# Patient Record
Sex: FEMALE | Race: WHITE | NOT HISPANIC OR LATINO | Employment: OTHER | URBAN - METROPOLITAN AREA
[De-identification: names, ages, dates, MRNs, and addresses within clinical notes are randomized per-mention and may not be internally consistent; named-entity substitution may affect disease eponyms.]

---

## 2017-01-04 ENCOUNTER — OFFICE VISIT (OUTPATIENT)
Dept: INTERNAL MEDICINE | Facility: CLINIC | Age: 72
End: 2017-01-04

## 2017-01-04 VITALS
WEIGHT: 176 LBS | HEART RATE: 91 BPM | DIASTOLIC BLOOD PRESSURE: 82 MMHG | OXYGEN SATURATION: 97 % | HEIGHT: 65 IN | SYSTOLIC BLOOD PRESSURE: 118 MMHG | TEMPERATURE: 98.2 F | BODY MASS INDEX: 29.32 KG/M2

## 2017-01-04 DIAGNOSIS — J20.9 ACUTE BRONCHITIS, UNSPECIFIED ORGANISM: Primary | ICD-10-CM

## 2017-01-04 PROCEDURE — 99214 OFFICE O/P EST MOD 30 MIN: CPT | Performed by: INTERNAL MEDICINE

## 2017-01-04 RX ORDER — AZITHROMYCIN 250 MG/1
TABLET, FILM COATED ORAL
Qty: 6 TABLET | Refills: 0 | Status: SHIPPED | OUTPATIENT
Start: 2017-01-04 | End: 2017-01-25

## 2017-01-04 RX ORDER — BENZONATATE 200 MG/1
200 CAPSULE ORAL 3 TIMES DAILY PRN
Qty: 30 CAPSULE | Refills: 0 | Status: SHIPPED | OUTPATIENT
Start: 2017-01-04 | End: 2017-01-25

## 2017-01-04 NOTE — MR AVS SNAPSHOT
Deja Juárez   1/4/2017 9:00 AM   Office Visit    Dept Phone:  [phone]   Encounter #:  [id number]    Provider:  Arturo Abdalla MD   Department:  [facility] INTERNAL MEDICINE                Your Full Care Plan              Today's Medication Changes          These changes are accurate as of: 1/4/17  9:37 AM.  If you have any questions, ask your nurse or doctor.               New Medication(s)Ordered:     azithromycin 250 MG tablet   Commonly known as:  ZITHROMAX   Take 2 tablets the first day, then 1 tablet daily for 4 days.   Started by:  Arturo Abdalla MD       benzonatate 200 MG capsule   Commonly known as:  TESSALON   Take 1 capsule by mouth 3 (Three) Times a Day As Needed for cough.   Started by:  Arturo Abdalla MD         Stop taking medication(s)listed here:     PARoxetine 10 MG tablet   Commonly known as:  PAXIL   Stopped by:  Arturo Abdalla MD                Where to Get Your Medications      These medications were sent to [address] [facility] AT [facility] [phone] [facility] [phone] [place] [address]     Phone:  [phone]     azithromycin 250 MG tablet    benzonatate 200 MG capsule                  Your Updated Medication List          This list is accurate as of: 1/4/17  9:37 AM.  Always use your most recent med list.                azithromycin 250 MG tablet   Commonly known as:  ZITHROMAX   Take 2 tablets the first day, then 1 tablet daily for 4 days.       benzonatate 200 MG capsule   Commonly known as:  TESSALON   Take 1 capsule by mouth 3 (Three) Times a Day As Needed for cough.       CALCIUM 500 + D PO       DAYQUIL PO       esomeprazole 20 MG capsule   Commonly known as:  nexIUM       naproxen sodium 220 MG tablet   Commonly known as:  ALEVE       NYQUIL PO               You Were Diagnosed With        Codes Comments    Acute bronchitis, unspecified organism     -  Primary ICD-10-CM: J20.9  ICD-9-CM: 466.0       Instructions     None    Patient Instructions History      Upcoming Appointments     Visit Type Date Time Department    ACUTE           2017  9:00 AM [facility]    FOLLOW UP 2017  3:00 PM [facility]    OFFICE VISIT 2017  1:30 PM [facility]      [employer] Signup     [facility] "[employer] allows you to send messages to your doctor, view your test results, renew your prescriptions, schedule appointments, and more. To sign up, go to [employer] and click on the Sign Up Now link in the New User? box. Enter your "[employer] Activation Code exactly as it appears below along with the last four digits of your Social Security Number and your Date of Birth () to complete the sign-up process. If you do not sign up before the expiration date, you must request a new code.    "[employer] Activation Code: [email or web address]  Expires: 2017  9:37 AM    If you have questions, you can email [email or web address] or call [phone] to talk to our "[employer] staff. Remember, "[employer] is NOT to be used for urgent needs. For medical emergencies, dial 911.               Other Info from Your Visit           Your Appointments     2017  3:00 PM EST   Follow Up with Vicky Smiley MD   [facility] INTERNAL MEDICINE (--)    [address] [place] [phone]           Arrive 15 minutes prior to appointment.            2017  1:30 PM EDT   Office Visit with Vicky Smiley MD   [facility] INTERNAL MEDICINE (--)    [address] [place] [phone]   [phone]           Arrive 15 minutes prior to appointment.              Allergies     No Known Allergies      Reason for Visit     URI x 1 week      Vital Signs     Blood Pressure Pulse Temperature Height Weight Oxygen Saturation    118/82 (BP Location: Left arm, Patient Position: Sitting, Cuff Size: Adult) 91  "98.2 °F (36.8 °C) (Oral) 65\" (165.1 cm) 176 lb (79.8 kg) 97%    Body Mass Index Smoking Status                29.29 kg/m2 Former Smoker          Problems and Diagnoses Noted     Acute bronchitis    -  Primary        "

## 2017-01-04 NOTE — PROGRESS NOTES
Subjective   Deja Juárez is a 71 y.o. female.     URI    This is a new problem. The current episode started in the past 7 days. Associated symptoms include coughing (Yellow sputum), rhinorrhea, sneezing and a sore throat. Pertinent negatives include no chest pain or wheezing.        The following portions of the patient's history were reviewed and updated as appropriate: allergies, current medications, past family history, past medical history, past social history, past surgical history and problem list.    Review of Systems   Constitutional: Positive for chills. Negative for fever.   HENT: Positive for postnasal drip, rhinorrhea, sinus pressure, sneezing and sore throat.    Respiratory: Positive for cough (Yellow sputum). Negative for wheezing.    Cardiovascular: Negative for chest pain and palpitations.       Objective   Physical Exam   Constitutional: She appears well-developed.   HENT:   Right Ear: External ear normal.   Left Ear: External ear normal.   Mouth/Throat: Oropharynx is clear and moist.   Neck: Neck supple.   Cardiovascular: Normal rate, regular rhythm and normal heart sounds.    Repeat 134/70   Pulmonary/Chest: Effort normal.   Scattered rhonchi     Musculoskeletal: Normal range of motion.   Neurological: She is alert.   Vitals reviewed.      Assessment/Plan   Deja was seen today for uri.    Diagnoses and all orders for this visit:    Acute bronchitis, unspecified organism  -     azithromycin (ZITHROMAX) 250 MG tablet; Take 2 tablets the first day, then 1 tablet daily for 4 days.  -     benzonatate (TESSALON) 200 MG capsule; Take 1 capsule by mouth 3 (Three) Times a Day As Needed for cough.    Advised other OTC RXs

## 2017-01-25 ENCOUNTER — OFFICE VISIT (OUTPATIENT)
Dept: INTERNAL MEDICINE | Facility: CLINIC | Age: 72
End: 2017-01-25

## 2017-01-25 VITALS
WEIGHT: 168 LBS | HEIGHT: 65 IN | OXYGEN SATURATION: 97 % | SYSTOLIC BLOOD PRESSURE: 120 MMHG | HEART RATE: 78 BPM | BODY MASS INDEX: 27.99 KG/M2 | DIASTOLIC BLOOD PRESSURE: 80 MMHG

## 2017-01-25 DIAGNOSIS — Z01.818 PRE-OP EXAM: Primary | ICD-10-CM

## 2017-01-25 PROCEDURE — 99213 OFFICE O/P EST LOW 20 MIN: CPT | Performed by: INTERNAL MEDICINE

## 2017-01-25 RX ORDER — ESOMEPRAZOLE MAGNESIUM 10 MG/1
10 GRANULE, FOR SUSPENSION, EXTENDED RELEASE ORAL EVERY 24 HOURS
COMMUNITY
End: 2017-01-25

## 2017-01-25 RX ORDER — CITALOPRAM 20 MG/1
20 TABLET ORAL
COMMUNITY
End: 2017-01-25

## 2017-01-25 RX ORDER — MULTIVITAMIN
1 TABLET ORAL DAILY
COMMUNITY

## 2017-01-25 NOTE — PROGRESS NOTES
"Chief Complaint   Patient presents with   • surgical clearance     knee replacement        Subjective   Deja Juárez is a 72 y.o. female     HPI:  She is going to have left total knee replacement. 2/13/2017.     The following portions of the patient's history were reviewed and updated as appropriate: allergies, current medications, past social history, problem list, past surgical history    Review of Systems   Constitutional: Negative for appetite change, fatigue and fever.   HENT: Negative for sinus pressure and sore throat.    Eyes: Negative for visual disturbance.   Respiratory: Negative for cough, shortness of breath and wheezing.    Cardiovascular: Negative for chest pain, palpitations and leg swelling.   Gastrointestinal: Negative for abdominal pain, constipation, diarrhea, nausea and vomiting.   Endocrine: Negative for cold intolerance and heat intolerance.   Genitourinary: Negative for dysuria and frequency.   Neurological: Negative for dizziness, syncope and headaches.   Hematological: Does not bruise/bleed easily.       Visit Vitals   • /80 (BP Location: Left arm, Patient Position: Sitting, Cuff Size: Adult)   • Pulse 78   • Ht 65\" (165.1 cm)   • Wt 168 lb (76.2 kg)   • SpO2 97%   • BMI 27.96 kg/m2        Objective   Physical Exam   Constitutional: She is oriented to person, place, and time. She appears well-developed and well-nourished. No distress.   Neck: Normal carotid pulses present. Carotid bruit is not present.   Cardiovascular: Normal rate, regular rhythm, S1 normal, S2 normal and intact distal pulses.  Exam reveals no gallop and no friction rub.    No murmur heard.  Pulses:       Carotid pulses are 2+ on the right side, and 2+ on the left side.  Pulmonary/Chest: Effort normal and breath sounds normal. No respiratory distress. She has no wheezes. She has no rales. Chest wall is not dull to percussion.   Musculoskeletal: She exhibits no edema.   Neurological: She is alert and oriented to " person, place, and time. No cranial nerve deficit.   Skin: Skin is warm and dry.   Nursing note and vitals reviewed.      Assessment/Plan   Problem List Items Addressed This Visit     None      Visit Diagnoses     Pre-op exam    -  Primary    She is medically clear for surgery

## 2017-01-25 NOTE — MR AVS SNAPSHOT
Deja Juárez   1/25/2017 3:00 PM   Office Visit    Dept Phone:  [phone]   Encounter #:  [id number]    Provider:  Vicky Smiley MD   Department:  [facility] INTERNAL MEDICINE                Your Full Care Plan              Today's Medication Changes          These changes are accurate as of: 1/25/17  3:32 PM.  If you have any questions, ask your nurse or doctor.               Medication(s)that have changed:     esomeprazole 20 MG capsule   Commonly known as:  nexIUM   Take 20 mg by mouth every morning before breakfast.   What changed:  Another medication with the same name was removed. Continue taking this medication, and follow the directions you see here.         Stop taking medication(s)listed here:     azithromycin 250 MG tablet   Commonly known as:  ZITHROMAX           benzonatate 200 MG capsule   Commonly known as:  TESSALON           citalopram 20 MG tablet   Commonly known as:  CeleXA           DAYQUIL PO           NYQUIL PO                      Your Updated Medication List          This list is accurate as of: 1/25/17  3:32 PM.  Always use your most recent med list.                * CALCIUM 500 + D PO       * CALTRATE 600+D 600-400 MG-UNIT per tablet   Generic drug:  calcium carbonate-vitamin d       esomeprazole 20 MG capsule   Commonly known as:  nexIUM       multivitamin tablet       * mupirocin 2 % ointment   Commonly known as:  BACTROBAN       * mupirocin 2 % ointment   Commonly known as:  BACTROBAN   Start taking on:  2/8/2017       naproxen sodium 220 MG tablet   Commonly known as:  ALEVE       Vitamin D 1000 UNITS tablet       * Notice:  This list has 4 medication(s) that are the same as other medications prescribed for you. Read the directions carefully, and ask your doctor or other care provider to review them with you.            You Were Diagnosed With        Codes Comments    Pre-op exam    -  Primary ICD-10-CM: Z01.818  ICD-9-CM: V72.84        "  Instructions    Use skin moisturizer daily - Cetaphil Cream, Eucerin Cream are good ones     Patient Instructions History      Upcoming Appointments     Visit Type Date Time Department    FOLLOW UP 2017  3:00 PM [facility]    OFFICE VISIT 2017  1:30 PM [employer] Signup     [facility] [employer] allows you to send messages to your doctor, view your test results, renew your prescriptions, schedule appointments, and more. To sign up, go to [employer] and click on the Sign Up Now link in the New User? box. Enter your [employer] Activation Code exactly as it appears below along with the last four digits of your Social Security Number and your Date of Birth () to complete the sign-up process. If you do not sign up before the expiration date, you must request a new code.    [employer] Activation Code: [email or web address]  Expires: 2017  3:32 PM    If you have questions, you can email [email or web address] or call [phone] to talk to our [employer] staff. Remember, [employer] is NOT to be used for urgent needs. For medical emergencies, dial 911.               Other Info from Your Visit           Your Appointments     2017  1:30 PM EDT   Office Visit with Vicky Smiley MD   [facility] MEDICAL [facility] INTERNAL MEDICINE (--)    [address] [address] [phone]           Arrive 15 minutes prior to appointment.              Allergies     No Known Allergies      Reason for Visit     surgical clearance knee replacement      Vital Signs     Blood Pressure Pulse Height Weight Oxygen Saturation Body Mass Index    120/80 (BP Location: Left arm, Patient Position: Sitting, Cuff Size: Adult) 78 65\" (165.1 cm) 168 lb (76.2 kg) 97% 27.96 kg/m2    Smoking Status                   Former Smoker           Problems and Diagnoses Noted     Encounter for pre-operative examination    -  Primary      No Longer an Issue     Elevated blood pressure    "

## 2017-01-27 ENCOUNTER — TELEPHONE (OUTPATIENT)
Dept: INTERNAL MEDICINE | Facility: CLINIC | Age: 72
End: 2017-01-27

## 2017-01-27 DIAGNOSIS — Z01.810 PRE-OPERATIVE CARDIOVASCULAR EXAMINATION: Primary | ICD-10-CM

## 2017-01-27 NOTE — TELEPHONE ENCOUNTER
----- Message from Stephanie Gross sent at 1/27/2017  2:29 PM EST -----  Contact: Patient  Dr. Smiley patient     Patient called.  She has surgery scheduled 02/13/2017 for left total knee replacement.  The anesthesiologist will not administer anesthesia until patient is cleared by a cardiologist.  Patient wants referral from Dr. Smiley for cardiologist.  Please advise.      Patient:  [phone] (patient will not be home Monday)

## 2017-01-31 ENCOUNTER — OFFICE VISIT (OUTPATIENT)
Dept: INTERNAL MEDICINE | Facility: CLINIC | Age: 72
End: 2017-01-31

## 2017-01-31 VITALS
SYSTOLIC BLOOD PRESSURE: 124 MMHG | WEIGHT: 169 LBS | TEMPERATURE: 98.4 F | BODY MASS INDEX: 28.16 KG/M2 | HEART RATE: 95 BPM | DIASTOLIC BLOOD PRESSURE: 78 MMHG | HEIGHT: 65 IN | OXYGEN SATURATION: 96 %

## 2017-01-31 DIAGNOSIS — J20.9 ACUTE BRONCHITIS, UNSPECIFIED ORGANISM: Primary | ICD-10-CM

## 2017-01-31 PROCEDURE — 99213 OFFICE O/P EST LOW 20 MIN: CPT | Performed by: NURSE PRACTITIONER

## 2017-01-31 RX ORDER — PROMETHAZINE HYDROCHLORIDE AND CODEINE PHOSPHATE 6.25; 1 MG/5ML; MG/5ML
5 SYRUP ORAL EVERY 4 HOURS PRN
Qty: 118 ML | Refills: 0 | Status: SHIPPED | OUTPATIENT
Start: 2017-01-31 | End: 2017-02-08 | Stop reason: SDUPTHER

## 2017-01-31 RX ORDER — SULFAMETHOXAZOLE AND TRIMETHOPRIM 800; 160 MG/1; MG/1
1 TABLET ORAL 2 TIMES DAILY
Qty: 14 TABLET | Refills: 0 | Status: SHIPPED | OUTPATIENT
Start: 2017-01-31 | End: 2017-02-07

## 2017-01-31 RX ORDER — GUAIFENESIN 600 MG/1
1200 TABLET, EXTENDED RELEASE ORAL 2 TIMES DAILY
Qty: 14 TABLET
Start: 2017-01-31 | End: 2017-02-07

## 2017-02-08 ENCOUNTER — APPOINTMENT (OUTPATIENT)
Dept: WOMENS IMAGING | Facility: HOSPITAL | Age: 72
End: 2017-02-08

## 2017-02-08 ENCOUNTER — OFFICE VISIT (OUTPATIENT)
Dept: CARDIOLOGY | Facility: CLINIC | Age: 72
End: 2017-02-08

## 2017-02-08 ENCOUNTER — OFFICE VISIT (OUTPATIENT)
Dept: INTERNAL MEDICINE | Facility: CLINIC | Age: 72
End: 2017-02-08

## 2017-02-08 VITALS
HEIGHT: 65 IN | DIASTOLIC BLOOD PRESSURE: 66 MMHG | BODY MASS INDEX: 27.49 KG/M2 | WEIGHT: 165 LBS | SYSTOLIC BLOOD PRESSURE: 124 MMHG | HEART RATE: 76 BPM

## 2017-02-08 VITALS
HEART RATE: 86 BPM | SYSTOLIC BLOOD PRESSURE: 150 MMHG | TEMPERATURE: 97.5 F | WEIGHT: 167 LBS | BODY MASS INDEX: 27.82 KG/M2 | OXYGEN SATURATION: 98 % | HEIGHT: 65 IN | DIASTOLIC BLOOD PRESSURE: 82 MMHG

## 2017-02-08 DIAGNOSIS — Z01.810 PREOP CARDIOVASCULAR EXAM: Primary | ICD-10-CM

## 2017-02-08 DIAGNOSIS — E78.5 HYPERLIPIDEMIA, UNSPECIFIED HYPERLIPIDEMIA TYPE: ICD-10-CM

## 2017-02-08 DIAGNOSIS — R94.31 ABNORMAL ECG: ICD-10-CM

## 2017-02-08 DIAGNOSIS — R07.89 OTHER CHEST PAIN: ICD-10-CM

## 2017-02-08 DIAGNOSIS — R05.9 COUGH: ICD-10-CM

## 2017-02-08 DIAGNOSIS — R06.02 SHORTNESS OF BREATH: ICD-10-CM

## 2017-02-08 DIAGNOSIS — J20.9 ACUTE BRONCHITIS, UNSPECIFIED ORGANISM: Primary | ICD-10-CM

## 2017-02-08 PROCEDURE — 99213 OFFICE O/P EST LOW 20 MIN: CPT | Performed by: NURSE PRACTITIONER

## 2017-02-08 PROCEDURE — 93000 ELECTROCARDIOGRAM COMPLETE: CPT | Performed by: INTERNAL MEDICINE

## 2017-02-08 PROCEDURE — 71020 XR CHEST 2 VW: CPT | Performed by: NURSE PRACTITIONER

## 2017-02-08 PROCEDURE — 71020 CHG CHEST X-RAY 2 VW: CPT | Performed by: RADIOLOGY

## 2017-02-08 PROCEDURE — 99204 OFFICE O/P NEW MOD 45 MIN: CPT | Performed by: INTERNAL MEDICINE

## 2017-02-08 RX ORDER — PROMETHAZINE HYDROCHLORIDE AND CODEINE PHOSPHATE 6.25; 1 MG/5ML; MG/5ML
5 SYRUP ORAL EVERY 4 HOURS PRN
Qty: 118 ML | Refills: 0 | Status: SHIPPED | OUTPATIENT
Start: 2017-02-08 | End: 2017-02-13

## 2017-02-08 RX ORDER — AZITHROMYCIN 250 MG/1
TABLET, FILM COATED ORAL
Qty: 6 TABLET | Refills: 0 | Status: SHIPPED | OUTPATIENT
Start: 2017-02-08 | End: 2017-04-14

## 2017-02-08 RX ORDER — METHYLPREDNISOLONE 4 MG/1
TABLET ORAL
Qty: 21 TABLET | Refills: 0 | Status: SHIPPED | OUTPATIENT
Start: 2017-02-08 | End: 2017-04-14

## 2017-02-08 NOTE — PROGRESS NOTES
Date of Office Visit: 2017  Encounter Provider: Lucy Garcia MD  Place of Service: [facility]  Patient Name: Deja Juárez  :1945      Patient ID:  Deja Juárez is a 72 y.o. female is here for a preoperative cardiac exam.           History of Present Illness     Deja Juárez is here today as a preoperative evaluation.  She is schedule to have a left knee replacement performed by Dr. Vo.  She has been short-winded with a cough for about six weeks, which she thinks is infectious in nature due to the flu and an upper respiratory tract infection.  She has noticed active wheezing.  She has no fever or chills.  She is not bringing anything up with her cough.  With this, she has also had chest pain with activity, but mostly with her cough and with deep breathing.      She does not feel her heart racing or skipping.  She has no dizziness or syncope.  Prior to all this she was feeling fine.  She did have a preoperative ECG performed which was abnormal and revealed a T-wave inversion across the precordial leads.      She has never had diabetes mellitus, heart failure, myocardial infarction, and has no history of hypertension.   She does not exercise regularly but she does golf.  She has a history of gastroesophageal reflux disease.  She has no history of sleep apnea, blood clots, kidney problems, asthma, emphysema, seizure, stroke or aneurysm.  She has anxiety and depression, and a history of scoliosis.     She quit smoking 45 years ago.  She is  and lives with her .  She is retired.  She has two grandchildren.  She drinks one cup of coffee per day.  No alcohol or drugs.      Diabetes is in her family in her father as well as hypertension being present in her mother and father.            Past Medical History   Diagnosis Date   • Anxiety    • Depression    • GERD (gastroesophageal reflux disease)    • Hyperlipidemia    • Scoliosis           Past Surgical History   Procedure Laterality Date   • Lipoma excision       from shoulder       Current Outpatient Prescriptions on File Prior to Visit   Medication Sig Dispense Refill   • Calcium Carbonate-Vitamin D (CALCIUM 500 + D PO) Take  by mouth. Vitamin DX 1000 IU     • calcium carbonate-vitamin d (CALTRATE 600+D) 600-400 MG-UNIT per tablet Take 1 tablet by mouth.     • Cholecalciferol (VITAMIN D) 1000 UNITS tablet Take 1,000 Units by mouth.     • esomeprazole (NexIUM) 20 MG capsule Take 20 mg by mouth As Needed.     • Multiple Vitamin (MULTIVITAMIN) tablet Take 1 tablet by mouth.     • naproxen sodium (ALEVE) 220 MG tablet Take 220 mg by mouth 2 (two) times a day as needed for mild pain (1-3).     • [] guaiFENesin (MUCINEX) 600 MG 12 hr tablet Take 2 tablets by mouth 2 (Two) Times a Day for 7 days. (Patient taking differently: Take 1,200 mg by mouth As Needed.) 14 tablet    • [] sulfamethoxazole-trimethoprim (BACTRIM DS,SEPTRA DS) 800-160 MG per tablet Take 1 tablet by mouth 2 (Two) Times a Day for 7 days. 14 tablet 0   • [DISCONTINUED] mupirocin (BACTROBAN) 2 % ointment        No current facility-administered medications on file prior to visit.        Social History     Social History   • Marital status:      Spouse name: N/A   • Number of children: N/A   • Years of education: N/A     Occupational History   • Not on file.     Social History Main Topics   • Smoking status: Former Smoker   • Smokeless tobacco: Not on file      Comment: CAFFEINE USE   • Alcohol use No   • Drug use: No   • Sexual activity: Not on file     Other Topics Concern   • Not on file     Social History Narrative           Review of Systems   Constitution: Negative.   HENT: Negative for congestion and headaches.    Eyes: Negative for vision loss in left eye and vision loss in right eye.   Respiratory: Negative.  Negative for cough, hemoptysis, shortness of breath, sleep disturbances due to breathing,  "snoring, sputum production and wheezing.    Endocrine: Negative.    Hematologic/Lymphatic: Negative.    Skin: Negative for poor wound healing and rash.   Musculoskeletal: Negative for falls, gout, muscle cramps and myalgias.   Gastrointestinal: Negative for abdominal pain, diarrhea, dysphagia, hematemesis, melena, nausea and vomiting.   Neurological: Negative for excessive daytime sleepiness, dizziness, light-headedness, loss of balance, seizures and vertigo.   Psychiatric/Behavioral: Negative for depression and substance abuse. The patient is not nervous/anxious.        Procedures    ECG 12 Lead  Date/Time: 2/8/2017 12:20 PM  Performed by: MORE PEACOCK  Authorized by: MORE PEACOCK   Comparison: not compared with previous ECG   Rhythm: sinus rhythm  T depression: V2, V3, V4, V5 and V6  Clinical impression: abnormal ECG               Objective:      Vitals:    02/08/17 1200 02/08/17 1207   BP: 138/60 124/66   BP Location: Right arm Left arm   Patient Position: Sitting Sitting   Pulse: 76    Weight: 165 lb (74.8 kg)    Height: 65\" (165.1 cm)      Body mass index is 27.46 kg/(m^2).    Physical Exam   Constitutional: She is oriented to person, place, and time. She appears well-developed and well-nourished. No distress.   HENT:   Head: Normocephalic and atraumatic.   Eyes: Conjunctivae are normal. No scleral icterus.   Neck: Neck supple. No JVD present. Carotid bruit is not present. No thyromegaly present.   Cardiovascular: Normal rate, regular rhythm, S1 normal, S2 normal, normal heart sounds and intact distal pulses.   No extrasystoles are present. PMI is not displaced.  Exam reveals no gallop.    No murmur heard.  Pulses:       Carotid pulses are 2+ on the right side, and 2+ on the left side.       Radial pulses are 2+ on the right side, and 2+ on the left side.        Dorsalis pedis pulses are 2+ on the right side, and 2+ on the left side.        Posterior tibial pulses are 2+ on the right side, and " 2+ on the left side.   Pulmonary/Chest: Effort normal. No respiratory distress. She has wheezes. She has no rhonchi. She has no rales. She exhibits no tenderness.   Abdominal: Soft. Bowel sounds are normal. She exhibits no distension, no abdominal bruit and no mass. There is no tenderness.   Musculoskeletal: She exhibits no edema or deformity.   Lymphadenopathy:     She has no cervical adenopathy.   Neurological: She is alert and oriented to person, place, and time. No cranial nerve deficit.   Skin: Skin is warm and dry. No rash noted. She is not diaphoretic. No cyanosis. No pallor. Nails show no clubbing.   Psychiatric: She has a normal mood and affect. Judgment normal.   Vitals reviewed.      Lab Review:       Assessment:      Diagnosis Plan   1. Preop cardiovascular exam     2. Hyperlipidemia, unspecified hyperlipidemia type     3. Abnormal ECG        1. Preoperative evaluation prior to left knee surgery.    2. Abnormal EKG with anterior T-wave inversions.    3. Dyspnea on exertion with mild exertional chest tightness and chest tightness noticed with coughing.  I suspect this is mostly due to pulmonary issues at this point; however, given the way her EKG looks I think a stress study and echocardiogram is also warranted.  She has other risks for coronary disease including her age and hyperlipidemia.    4. Hyperlipidemia.  She is on no medication for this.           Plan:       Set up testing and if normal, no f/u is needed. No medication changes.

## 2017-02-09 NOTE — PROGRESS NOTES
Subjective   Deja Juárez is a 72 y.o. female who presents due to respiratory symptoms.    HPI Comments: She continues to c/o a persistent cough which has mainly been nonproductive, denies shortness of breath but does c/o chest tightness. She has taken Mucinex daily but cough has remained nonproductive.  She is scheduled for left knee replacement 2/13/17. She is scheduled for an echo and a stress test on Fri due to abnormal ECG.    URI    This is a new problem. The current episode started 1 to 4 weeks ago. The problem has been gradually worsening. There has been no fever. Associated symptoms include congestion, coughing, rhinorrhea, sinus pain, sneezing and a sore throat. Pertinent negatives include no abdominal pain, chest pain, diarrhea, dysuria, ear pain, nausea, vomiting or wheezing. She has tried acetaminophen for the symptoms. The treatment provided mild relief.   Cough   This is a new problem. The current episode started 1 to 4 weeks ago. The problem has been unchanged. Associated symptoms include postnasal drip, rhinorrhea and a sore throat. Pertinent negatives include no chest pain, chills, ear pain, fever, shortness of breath or wheezing.        The following portions of the patient's history were reviewed and updated as appropriate: allergies, current medications, past social history and problem list.    Past Medical History   Diagnosis Date   • Anxiety    • Depression    • GERD (gastroesophageal reflux disease)    • Hyperlipidemia    • Scoliosis          Current Outpatient Prescriptions:   •  Calcium Carbonate-Vitamin D (CALCIUM 500 + D PO), Take  by mouth. Vitamin DX 1000 IU, Disp: , Rfl:   •  calcium carbonate-vitamin d (CALTRATE 600+D) 600-400 MG-UNIT per tablet, Take 1 tablet by mouth., Disp: , Rfl:   •  Cholecalciferol (VITAMIN D) 1000 UNITS tablet, Take 1,000 Units by mouth., Disp: , Rfl:   •  esomeprazole (NexIUM) 20 MG capsule, Take 20 mg by mouth As Needed., Disp: , Rfl:   •  Multiple Vitamin  "(MULTIVITAMIN) tablet, Take 1 tablet by mouth., Disp: , Rfl:   •  naproxen sodium (ALEVE) 220 MG tablet, Take 220 mg by mouth 2 (two) times a day as needed for mild pain (1-3)., Disp: , Rfl:   •  azithromycin (ZITHROMAX) 250 MG tablet, Take 2 tablets the first day, then 1 tablet daily for 4 days., Disp: 6 tablet, Rfl: 0  •  MethylPREDNISolone (MEDROL) 4 MG tablet, follow package directions, Disp: 21 tablet, Rfl: 0  •  promethazine-codeine (PHENERGAN with CODEINE) 6.25-10 MG/5ML syrup, Take 5 mL by mouth Every 4 (Four) Hours As Needed for cough for up to 5 days., Disp: 118 mL, Rfl: 0    No Known Allergies    Review of Systems   Constitutional: Positive for fatigue. Negative for activity change, appetite change, chills, fever and unexpected weight change.   HENT: Positive for congestion, postnasal drip, rhinorrhea, sinus pressure, sneezing and sore throat. Negative for drooling, ear pain, facial swelling, hearing loss, mouth sores, nosebleeds, trouble swallowing and voice change.    Eyes: Negative for pain, discharge, itching and visual disturbance.   Respiratory: Positive for cough and chest tightness. Negative for choking, shortness of breath and wheezing.    Cardiovascular: Negative for chest pain and palpitations.   Gastrointestinal: Negative for abdominal pain, constipation, diarrhea, nausea and vomiting.   Endocrine: Negative for polyuria.   Genitourinary: Negative for dysuria and frequency.   Musculoskeletal: Negative for back pain and joint swelling.       Objective   Vitals:    02/08/17 1357   BP: 150/82   BP Location: Left arm   Patient Position: Sitting   Cuff Size: Adult   Pulse: 86   Temp: 97.5 °F (36.4 °C)   TempSrc: Oral   SpO2: 98%   Weight: 167 lb (75.8 kg)   Height: 65\" (165.1 cm)     Physical Exam   Constitutional: She appears well-developed and well-nourished. She is cooperative. She does not have a sickly appearance. She does not appear ill.   HENT:   Head: Normocephalic.   Right Ear: Hearing and " external ear normal. No drainage, swelling or tenderness. Tympanic membrane is bulging. Tympanic membrane is not erythematous. No middle ear effusion. No decreased hearing is noted.   Left Ear: Hearing and external ear normal. No drainage, swelling or tenderness. Tympanic membrane is bulging. Tympanic membrane is not erythematous.  No middle ear effusion. No decreased hearing is noted.   Nose: Nose normal. No mucosal edema, rhinorrhea, sinus tenderness or nasal deformity. Right sinus exhibits no maxillary sinus tenderness and no frontal sinus tenderness. Left sinus exhibits no maxillary sinus tenderness and no frontal sinus tenderness.   Mouth/Throat: Mucous membranes are normal. Posterior oropharyngeal erythema present.   Eyes: Conjunctivae and lids are normal.   Neck: Trachea normal.   Cardiovascular: Regular rhythm, normal heart sounds and normal pulses.    No murmur heard.  Pulmonary/Chest: No respiratory distress. She has no decreased breath sounds. She has wheezes. She has no rhonchi. She has no rales.   Lymphadenopathy:     She has no cervical adenopathy.   Neurological: She is alert.   Skin: Skin is warm, dry and intact.   Nursing note and vitals reviewed.      Assessment/Plan   Deja was seen today for uri and cough.    Diagnoses and all orders for this visit:    Acute bronchitis, unspecified organism  Comments:  Will treat with Medrol due to persistent cough and chest congestion  Orders:  -     MethylPREDNISolone (MEDROL) 4 MG tablet; follow package directions  -     azithromycin (ZITHROMAX) 250 MG tablet; Take 2 tablets the first day, then 1 tablet daily for 4 days.  -     promethazine-codeine (PHENERGAN with CODEINE) 6.25-10 MG/5ML syrup; Take 5 mL by mouth Every 4 (Four) Hours As Needed for cough for up to 5 days.    Cough  Comments:  no acute abnl CXR-will send for review  Orders:  -     XR Chest 2 View

## 2017-02-10 ENCOUNTER — HOSPITAL ENCOUNTER (OUTPATIENT)
Dept: CARDIOLOGY | Facility: HOSPITAL | Age: 72
Discharge: HOME | End: 2017-02-10
Attending: INTERNAL MEDICINE | Admitting: INTERNAL MEDICINE

## 2017-02-10 ENCOUNTER — HOSPITAL ENCOUNTER (OUTPATIENT)
Dept: CARDIOLOGY | Facility: HOSPITAL | Age: 72
Discharge: HOME | End: 2017-02-10
Attending: INTERNAL MEDICINE

## 2017-02-10 VITALS
HEART RATE: 92 BPM | HEIGHT: 65 IN | DIASTOLIC BLOOD PRESSURE: 70 MMHG | BODY MASS INDEX: 27.49 KG/M2 | SYSTOLIC BLOOD PRESSURE: 120 MMHG | WEIGHT: 165 LBS

## 2017-02-10 DIAGNOSIS — R94.31 ABNORMAL ECG: ICD-10-CM

## 2017-02-10 DIAGNOSIS — E78.5 HYPERLIPIDEMIA, UNSPECIFIED HYPERLIPIDEMIA TYPE: ICD-10-CM

## 2017-02-10 DIAGNOSIS — Z01.810 PREOP CARDIOVASCULAR EXAM: ICD-10-CM

## 2017-02-10 DIAGNOSIS — R06.02 SHORTNESS OF BREATH: ICD-10-CM

## 2017-02-10 DIAGNOSIS — R07.89 OTHER CHEST PAIN: ICD-10-CM

## 2017-02-10 LAB
ASCENDING AORTA: 3.3 CM
BH CV ECHO MEAS - ACS: 1.9 CM
BH CV ECHO MEAS - AO MAX PG (FULL): 1.2 MMHG
BH CV ECHO MEAS - AO MAX PG: 5.3 MMHG
BH CV ECHO MEAS - AO MEAN PG (FULL): 0.61 MMHG
BH CV ECHO MEAS - AO MEAN PG: 3 MMHG
BH CV ECHO MEAS - AO ROOT AREA (BSA CORRECTED): 1.9
BH CV ECHO MEAS - AO ROOT AREA: 9.5 CM^2
BH CV ECHO MEAS - AO ROOT DIAM: 3.5 CM
BH CV ECHO MEAS - AO V2 MAX: 115.4 CM/SEC
BH CV ECHO MEAS - AO V2 MEAN: 82.2 CM/SEC
BH CV ECHO MEAS - AO V2 VTI: 24.3 CM
BH CV ECHO MEAS - AVA(I,A): 2.7 CM^2
BH CV ECHO MEAS - AVA(I,D): 2.7 CM^2
BH CV ECHO MEAS - AVA(V,A): 3 CM^2
BH CV ECHO MEAS - AVA(V,D): 3 CM^2
BH CV ECHO MEAS - BSA(HAYCOCK): 1.9 M^2
BH CV ECHO MEAS - BSA: 1.8 M^2
BH CV ECHO MEAS - BZI_BMI: 27.5 KILOGRAMS/M^2
BH CV ECHO MEAS - BZI_METRIC_HEIGHT: 165.1 CM
BH CV ECHO MEAS - BZI_METRIC_WEIGHT: 74.8 KG
BH CV ECHO MEAS - CONTRAST EF (2CH): 65.3 ML/M^2
BH CV ECHO MEAS - CONTRAST EF 4CH: 69.1 ML/M^2
BH CV ECHO MEAS - EDV(CUBED): 93.1 ML
BH CV ECHO MEAS - EDV(MOD-SP2): 72 ML
BH CV ECHO MEAS - EDV(MOD-SP4): 97 ML
BH CV ECHO MEAS - EDV(TEICH): 94 ML
BH CV ECHO MEAS - EF(CUBED): 73.3 %
BH CV ECHO MEAS - EF(MOD-SP2): 65.3 %
BH CV ECHO MEAS - EF(MOD-SP4): 69.1 %
BH CV ECHO MEAS - EF(TEICH): 65.2 %
BH CV ECHO MEAS - ESV(CUBED): 24.9 ML
BH CV ECHO MEAS - ESV(MOD-SP2): 25 ML
BH CV ECHO MEAS - ESV(MOD-SP4): 30 ML
BH CV ECHO MEAS - ESV(TEICH): 32.7 ML
BH CV ECHO MEAS - FS: 35.6 %
BH CV ECHO MEAS - IVS/LVPW: 1
BH CV ECHO MEAS - IVSD: 0.88 CM
BH CV ECHO MEAS - LAT PEAK E' VEL: 11 CM/SEC
BH CV ECHO MEAS - LV DIASTOLIC VOL/BSA (35-75): 53.2 ML/M^2
BH CV ECHO MEAS - LV MASS(C)D: 131.1 GRAMS
BH CV ECHO MEAS - LV MASS(C)DI: 71.9 GRAMS/M^2
BH CV ECHO MEAS - LV MAX PG: 4.2 MMHG
BH CV ECHO MEAS - LV MEAN PG: 2.4 MMHG
BH CV ECHO MEAS - LV SYSTOLIC VOL/BSA (12-30): 16.5 ML/M^2
BH CV ECHO MEAS - LV V1 MAX: 101.9 CM/SEC
BH CV ECHO MEAS - LV V1 MEAN: 74 CM/SEC
BH CV ECHO MEAS - LV V1 VTI: 19.3 CM
BH CV ECHO MEAS - LVIDD: 4.5 CM
BH CV ECHO MEAS - LVIDS: 2.9 CM
BH CV ECHO MEAS - LVLD AP2: 6.1 CM
BH CV ECHO MEAS - LVLD AP4: 6.6 CM
BH CV ECHO MEAS - LVLS AP2: 5.5 CM
BH CV ECHO MEAS - LVLS AP4: 5.9 CM
BH CV ECHO MEAS - LVOT AREA (M): 3.5 CM^2
BH CV ECHO MEAS - LVOT AREA: 3.4 CM^2
BH CV ECHO MEAS - LVOT DIAM: 2.1 CM
BH CV ECHO MEAS - LVPWD: 0.88 CM
BH CV ECHO MEAS - MED PEAK E' VEL: 15 CM/SEC
BH CV ECHO MEAS - MR MAX PG: 131.5 MMHG
BH CV ECHO MEAS - MR MAX VEL: 573.4 CM/SEC
BH CV ECHO MEAS - MV A DUR: 0.1 SEC
BH CV ECHO MEAS - MV A MAX VEL: 79.1 CM/SEC
BH CV ECHO MEAS - MV DEC SLOPE: 268.1 CM/SEC^2
BH CV ECHO MEAS - MV DEC TIME: 0.25 SEC
BH CV ECHO MEAS - MV E MAX VEL: 65.5 CM/SEC
BH CV ECHO MEAS - MV E/A: 0.83
BH CV ECHO MEAS - MV MAX PG: 3.5 MMHG
BH CV ECHO MEAS - MV MEAN PG: 1.5 MMHG
BH CV ECHO MEAS - MV P1/2T MAX VEL: 66.9 CM/SEC
BH CV ECHO MEAS - MV P1/2T: 73.1 MSEC
BH CV ECHO MEAS - MV V2 MAX: 93.7 CM/SEC
BH CV ECHO MEAS - MV V2 MEAN: 56.2 CM/SEC
BH CV ECHO MEAS - MV V2 VTI: 24.1 CM
BH CV ECHO MEAS - MVA P1/2T LCG: 3.3 CM^2
BH CV ECHO MEAS - MVA(P1/2T): 3 CM^2
BH CV ECHO MEAS - MVA(VTI): 2.7 CM^2
BH CV ECHO MEAS - PA ACC TIME: 0.13 SEC
BH CV ECHO MEAS - PA MAX PG (FULL): 0.6 MMHG
BH CV ECHO MEAS - PA MAX PG: 3.6 MMHG
BH CV ECHO MEAS - PA PR(ACCEL): 20.4 MMHG
BH CV ECHO MEAS - PA V2 MAX: 95 CM/SEC
BH CV ECHO MEAS - PULM A REVS DUR: 0.11 SEC
BH CV ECHO MEAS - PULM A REVS VEL: 30.1 CM/SEC
BH CV ECHO MEAS - PULM DIAS VEL: 49.5 CM/SEC
BH CV ECHO MEAS - PULM S/D: 1.6
BH CV ECHO MEAS - PULM SYS VEL: 79.1 CM/SEC
BH CV ECHO MEAS - PVA(V,A): 2.7 CM^2
BH CV ECHO MEAS - PVA(V,D): 2.7 CM^2
BH CV ECHO MEAS - QP/QS: 0.8
BH CV ECHO MEAS - RAP SYSTOLE: 3 MMHG
BH CV ECHO MEAS - RV MAX PG: 3 MMHG
BH CV ECHO MEAS - RV MEAN PG: 1.7 MMHG
BH CV ECHO MEAS - RV V1 MAX: 86.8 CM/SEC
BH CV ECHO MEAS - RV V1 MEAN: 63.1 CM/SEC
BH CV ECHO MEAS - RV V1 VTI: 17.6 CM
BH CV ECHO MEAS - RVOT AREA: 3 CM^2
BH CV ECHO MEAS - RVOT DIAM: 2 CM
BH CV ECHO MEAS - RVSP: 22.2 MMHG
BH CV ECHO MEAS - SI(AO): 127.2 ML/M^2
BH CV ECHO MEAS - SI(CUBED): 37.4 ML/M^2
BH CV ECHO MEAS - SI(LVOT): 36 ML/M^2
BH CV ECHO MEAS - SI(MOD-SP2): 25.8 ML/M^2
BH CV ECHO MEAS - SI(MOD-SP4): 36.8 ML/M^2
BH CV ECHO MEAS - SI(TEICH): 33.6 ML/M^2
BH CV ECHO MEAS - SUP REN AO DIAM: 2.2 CM
BH CV ECHO MEAS - SV(AO): 231.8 ML
BH CV ECHO MEAS - SV(CUBED): 68.2 ML
BH CV ECHO MEAS - SV(LVOT): 65.6 ML
BH CV ECHO MEAS - SV(MOD-SP2): 47 ML
BH CV ECHO MEAS - SV(MOD-SP4): 67 ML
BH CV ECHO MEAS - SV(RVOT): 52.5 ML
BH CV ECHO MEAS - SV(TEICH): 61.3 ML
BH CV ECHO MEAS - TAPSE (>1.6): 2.5 CM2
BH CV ECHO MEAS - TR MAX VEL: 219.4 CM/SEC
BH CV NUCLEAR PRIOR STUDY: 3
BH CV STRESS BP STAGE 1: NORMAL
BH CV STRESS COMMENTS STAGE 1: NORMAL
BH CV STRESS DOSE REGADENOSON STAGE 1: 0.4
BH CV STRESS DURATION MIN STAGE 1: 0
BH CV STRESS DURATION SEC STAGE 1: 15
BH CV STRESS HR STAGE 1: 118
BH CV STRESS PROTOCOL 1: NORMAL
BH CV STRESS RECOVERY BP: NORMAL MMHG
BH CV STRESS RECOVERY HR: 98 BPM
BH CV STRESS STAGE 1: 1
BH CV XLRA - RV BASE: 2.6 CM
BH CV XLRA - TDI S': 10 CM/SEC
E/E' RATIO: 13
LEFT ATRIUM VOLUME INDEX: 22 ML/M2
LV EF NUC BP: 72 %
MAXIMAL PREDICTED HEART RATE: 149 BPM
PERCENT MAX PREDICTED HR: 79.19 %
SINUS: 2.7 CM
STJ: 2.5 CM
STRESS BASELINE BP: NORMAL MMHG
STRESS BASELINE HR: 84 BPM
STRESS PERCENT HR: 93 %
STRESS POST EXERCISE DUR MIN: 0 MIN
STRESS POST EXERCISE DUR SEC: 15 SEC
STRESS POST PEAK BP: NORMAL MMHG
STRESS POST PEAK HR: 118 BPM
STRESS TARGET HR: 127 BPM

## 2017-02-10 PROCEDURE — 25010000002 REGADENOSON 0.4 MG/5ML SOLUTION: Performed by: INTERNAL MEDICINE

## 2017-02-10 PROCEDURE — 93017 CV STRESS TEST TRACING ONLY: CPT

## 2017-02-10 PROCEDURE — 78452 HT MUSCLE IMAGE SPECT MULT: CPT

## 2017-02-10 PROCEDURE — A9502 TC99M TETROFOSMIN: HCPCS | Performed by: INTERNAL MEDICINE

## 2017-02-10 PROCEDURE — 0 TECHNETIUM TETROFOSMIN KIT: Performed by: INTERNAL MEDICINE

## 2017-02-10 PROCEDURE — 93016 CV STRESS TEST SUPVJ ONLY: CPT | Performed by: INTERNAL MEDICINE

## 2017-02-10 PROCEDURE — 93306 TTE W/DOPPLER COMPLETE: CPT

## 2017-02-10 PROCEDURE — 78452 HT MUSCLE IMAGE SPECT MULT: CPT | Performed by: INTERNAL MEDICINE

## 2017-02-10 PROCEDURE — 93018 CV STRESS TEST I&R ONLY: CPT | Performed by: INTERNAL MEDICINE

## 2017-02-10 PROCEDURE — 93306 TTE W/DOPPLER COMPLETE: CPT | Performed by: INTERNAL MEDICINE

## 2017-02-10 RX ADMIN — TETROFOSMIN 1 DOSE: 1.38 INJECTION, POWDER, LYOPHILIZED, FOR SOLUTION INTRAVENOUS at 08:15

## 2017-02-10 RX ADMIN — REGADENOSON 0.4 MG: 0.08 INJECTION, SOLUTION INTRAVENOUS at 09:17

## 2017-02-10 RX ADMIN — TETROFOSMIN 1 DOSE: 1.38 INJECTION, POWDER, LYOPHILIZED, FOR SOLUTION INTRAVENOUS at 09:17

## 2017-02-14 ENCOUNTER — TELEPHONE (OUTPATIENT)
Dept: CARDIOLOGY | Facility: CLINIC | Age: 72
End: 2017-02-14

## 2017-02-28 ENCOUNTER — TELEPHONE (OUTPATIENT)
Dept: INTERNAL MEDICINE | Facility: CLINIC | Age: 72
End: 2017-02-28

## 2017-02-28 NOTE — TELEPHONE ENCOUNTER
----- Message from Nini Maki sent at 2/28/2017  9:49 AM EST -----  Contact: pt  Pt calling she is scheduled for Knee replacement surgery. Dr. Garcia cleared her for surgery after an abnormal echocardiogram and was going to fax results to surgeon. However she thinks the results and surgery clearance was sent to Dr. Smiley. She would like to know if you all received anything from Dr. Garcia, and if so can you send it to her surgeon so she can be cleared. Please advise.     #[phone]

## 2017-02-28 NOTE — TELEPHONE ENCOUNTER
Called patient notified her the surgical clearance note for knee surgery was located in her chart, I printed the note from Dr Garcia and refaxed to Dr Vo at his [facility] location per patient request. Patient was very pleased

## 2017-04-10 DIAGNOSIS — Z12.31 ENCOUNTER FOR SCREENING MAMMOGRAM FOR BREAST CANCER: Primary | ICD-10-CM

## 2017-04-14 ENCOUNTER — OFFICE VISIT (OUTPATIENT)
Dept: INTERNAL MEDICINE | Facility: CLINIC | Age: 72
End: 2017-04-14

## 2017-04-14 ENCOUNTER — APPOINTMENT (OUTPATIENT)
Dept: WOMENS IMAGING | Facility: HOSPITAL | Age: 72
End: 2017-04-14

## 2017-04-14 VITALS
BODY MASS INDEX: 27.32 KG/M2 | WEIGHT: 164 LBS | SYSTOLIC BLOOD PRESSURE: 124 MMHG | DIASTOLIC BLOOD PRESSURE: 72 MMHG | RESPIRATION RATE: 14 BRPM | HEIGHT: 65 IN

## 2017-04-14 DIAGNOSIS — E78.5 HYPERLIPIDEMIA, UNSPECIFIED HYPERLIPIDEMIA TYPE: Primary | ICD-10-CM

## 2017-04-14 DIAGNOSIS — Z12.31 ENCOUNTER FOR SCREENING MAMMOGRAM FOR BREAST CANCER: ICD-10-CM

## 2017-04-14 DIAGNOSIS — Z79.899 ENCOUNTER FOR MEDICATION MANAGEMENT: ICD-10-CM

## 2017-04-14 LAB
ALBUMIN SERPL-MCNC: 4.7 G/DL (ref 3.5–5.2)
ALBUMIN/GLOB SERPL: 1.6 G/DL
ALP SERPL-CCNC: 89 U/L (ref 39–117)
ALT SERPL-CCNC: 19 U/L (ref 1–33)
AST SERPL-CCNC: 19 U/L (ref 1–32)
BILIRUB SERPL-MCNC: 0.9 MG/DL (ref 0.1–1.2)
BUN SERPL-MCNC: 9 MG/DL (ref 8–23)
BUN/CREAT SERPL: 12.2 (ref 7–25)
CALCIUM SERPL-MCNC: 9.9 MG/DL (ref 8.6–10.5)
CHLORIDE SERPL-SCNC: 94 MMOL/L (ref 98–107)
CHOLEST SERPL-MCNC: 255 MG/DL (ref 0–200)
CO2 SERPL-SCNC: 25.8 MMOL/L (ref 22–29)
CREAT SERPL-MCNC: 0.74 MG/DL (ref 0.57–1)
GLOBULIN SER CALC-MCNC: 3 GM/DL
GLUCOSE SERPL-MCNC: 94 MG/DL (ref 65–99)
HDLC SERPL-MCNC: 55 MG/DL (ref 40–60)
LDLC SERPL CALC-MCNC: 156 MG/DL (ref 0–100)
POTASSIUM SERPL-SCNC: 4.3 MMOL/L (ref 3.5–5.2)
PROT SERPL-MCNC: 7.7 G/DL (ref 6–8.5)
SODIUM SERPL-SCNC: 136 MMOL/L (ref 136–145)
TRIGL SERPL-MCNC: 220 MG/DL (ref 0–150)
VLDLC SERPL-MCNC: 44 MG/DL (ref 5–40)

## 2017-04-14 PROCEDURE — G0202 SCR MAMMO BI INCL CAD: HCPCS | Performed by: RADIOLOGY

## 2017-04-14 PROCEDURE — G0202 SCR MAMMO BI INCL CAD: HCPCS | Performed by: INTERNAL MEDICINE

## 2017-04-14 PROCEDURE — 99213 OFFICE O/P EST LOW 20 MIN: CPT | Performed by: INTERNAL MEDICINE

## 2017-04-14 NOTE — PROGRESS NOTES
"Chief Complaint   Patient presents with   • Hyperlipidemia     6 month follow up         Subjective   Deja Juárez is a 72 y.o. female     HPI: Hyperlipidemia:  No management changes were made at her last appointment. Her most recent lipid panel was done on 9/1/2016.  Total cholesterol was 267, Triglycerides 232, HDL 53, .   She does not take medication for treatment of hyperlipidemia.  She does not wish to.  She is trying to follow up with prudent diet.    She is scheduled for knee surgery soon. Left knee.  She is currently taking naproxen to help with discomfort related to knee arthritis.  She will of course be stopping this as directed by her surgeon prior to surgery.         The following portions of the patient's history were reviewed and updated as appropriate: allergies, current medications, past social history, problem list, past surgical history    Review of Systems   Constitutional: Negative for activity change and appetite change.   HENT: Negative for nosebleeds.    Eyes: Negative for visual disturbance.   Respiratory: Negative for cough and shortness of breath.    Cardiovascular: Negative for chest pain, palpitations and leg swelling.   Neurological: Negative for headaches.   Psychiatric/Behavioral: Negative for sleep disturbance.       /72 (BP Location: Left arm, Patient Position: Sitting, Cuff Size: Adult)  Resp 14  Ht 65\" (165.1 cm)  Wt 164 lb (74.4 kg)  BMI 27.29 kg/m2   130/70    Objective   Physical Exam   Constitutional: She is oriented to person, place, and time. She appears well-developed and well-nourished. No distress.   Neck: Normal carotid pulses present. Carotid bruit is not present.   Cardiovascular: Normal rate, regular rhythm, S1 normal, S2 normal and intact distal pulses.  Exam reveals no gallop and no friction rub.    No murmur heard.  Pulses:       Carotid pulses are 2+ on the right side, and 2+ on the left side.  Pulmonary/Chest: Effort normal and breath sounds " normal. No respiratory distress. She has no wheezes. She has no rhonchi. She has no rales. Chest wall is not dull to percussion.   Musculoskeletal: She exhibits no edema.   Neurological: She is alert and oriented to person, place, and time.   Skin: Skin is warm and dry.   Nursing note and vitals reviewed.      Assessment/Plan   Problem List Items Addressed This Visit        Cardiovascular and Mediastinum    Hyperlipidemia - Primary    Relevant Orders    Lipid Panel      Other Visit Diagnoses     Encounter for medication management        Relevant Orders    Comprehensive Metabolic Panel

## 2017-05-01 RX ORDER — ATORVASTATIN CALCIUM 40 MG/1
40 TABLET, FILM COATED ORAL DAILY
Qty: 90 TABLET | Refills: 1 | Status: SHIPPED | OUTPATIENT
Start: 2017-05-01 | End: 2018-06-03 | Stop reason: SDUPTHER

## 2017-07-12 ENCOUNTER — OFFICE VISIT (OUTPATIENT)
Dept: INTERNAL MEDICINE | Facility: CLINIC | Age: 72
End: 2017-07-12

## 2017-07-12 ENCOUNTER — APPOINTMENT (OUTPATIENT)
Dept: WOMENS IMAGING | Facility: HOSPITAL | Age: 72
End: 2017-07-12

## 2017-07-12 VITALS
TEMPERATURE: 98 F | BODY MASS INDEX: 26.63 KG/M2 | OXYGEN SATURATION: 97 % | SYSTOLIC BLOOD PRESSURE: 128 MMHG | HEART RATE: 87 BPM | WEIGHT: 160 LBS | DIASTOLIC BLOOD PRESSURE: 76 MMHG

## 2017-07-12 DIAGNOSIS — M25.562 ACUTE PAIN OF LEFT KNEE: Primary | ICD-10-CM

## 2017-07-12 DIAGNOSIS — M17.0 OSTEOARTHRITIS OF BOTH KNEES, UNSPECIFIED OSTEOARTHRITIS TYPE: ICD-10-CM

## 2017-07-12 PROCEDURE — 73560 X-RAY EXAM OF KNEE 1 OR 2: CPT | Performed by: RADIOLOGY

## 2017-07-12 PROCEDURE — 99213 OFFICE O/P EST LOW 20 MIN: CPT | Performed by: NURSE PRACTITIONER

## 2017-07-12 PROCEDURE — 73560 X-RAY EXAM OF KNEE 1 OR 2: CPT | Performed by: NURSE PRACTITIONER

## 2017-07-12 RX ORDER — METHYLPREDNISOLONE 4 MG/1
TABLET ORAL
Qty: 21 TABLET | Refills: 0 | Status: SHIPPED | OUTPATIENT
Start: 2017-07-12 | End: 2017-09-01

## 2017-07-12 RX ORDER — FAMOTIDINE 20 MG/1
20 TABLET, FILM COATED ORAL DAILY
COMMUNITY
End: 2020-06-05

## 2017-07-12 NOTE — PATIENT INSTRUCTIONS
Knee Pain  Knee pain is a very common symptom and can have many causes. Knee pain often goes away when you follow your health care provider's instructions for relieving pain and discomfort at home. However, knee pain can develop into a condition that needs treatment. Some conditions may include:  · Arthritis caused by wear and tear (osteoarthritis).  · Arthritis caused by swelling and irritation (rheumatoid arthritis or gout).  · A cyst or growth in your knee.  · An infection in your knee joint.  · An injury that will not heal.  · Damage, swelling, or irritation of the tissues that support your knee (torn ligaments or tendinitis).  If your knee pain continues, additional tests may be ordered to diagnose your condition. Tests may include X-rays or other imaging studies of your knee. You may also need to have fluid removed from your knee. Treatment for ongoing knee pain depends on the cause, but treatment may include:  · Medicines to relieve pain or swelling.  · Steroid injections in your knee.  · Physical therapy.  · Surgery.  HOME CARE INSTRUCTIONS  · Take medicines only as directed by your health care provider.  · Rest your knee and keep it raised (elevated) while you are resting.  · Do not do things that cause or worsen pain.  · Avoid high-impact activities or exercises, such as running, jumping rope, or doing jumping jacks.  · Apply ice to the knee area:    Put ice in a plastic bag.    Place a towel between your skin and the bag.    Leave the ice on for 20 minutes, 2-3 times a day.  · Ask your health care provider if you should wear an elastic knee support.  · Keep a pillow under your knee when you sleep.  · Lose weight if you are overweight. Extra weight can put pressure on your knee.  · Do not use any tobacco products, including cigarettes, chewing tobacco, or electronic cigarettes. If you need help quitting, ask your health care provider. Smoking may slow the healing of any bone and joint problems that you may  have.  SEEK MEDICAL CARE IF:  · Your knee pain continues, changes, or gets worse.  · You have a fever along with knee pain.  · Your knee lamont or locks up.  · Your knee becomes more swollen.  SEEK IMMEDIATE MEDICAL CARE IF:   · Your knee joint feels hot to the touch.  · You have chest pain or trouble breathing.     This information is not intended to replace advice given to you by your health care provider. Make sure you discuss any questions you have with your health care provider.     Document Released: 10/14/2008 Document Revised: 01/08/2016 Document Reviewed: 08/03/2015  [employer] Interactive Patient Education ©2017 [employer]

## 2017-07-12 NOTE — PROGRESS NOTES
Subjective   Deja Juárez is a 72 y.o. female.     HPI Comments: She has a history of osteoarthritis of bilateral knees. She was due to have surgery on left knee in April but had to reschedule secondary to her spouse having a MI. She is unaware of any trauma to the knee, but was very active yesterday around the house.     Knee Pain    The incident occurred 12 to 24 hours ago. The injury mechanism is unknown. The pain is present in the left knee. The quality of the pain is described as aching. The pain is at a severity of 3/10. The pain is mild. The pain has been intermittent since onset. Pertinent negatives include no loss of sensation, muscle weakness, numbness or tingling. The symptoms are aggravated by movement and weight bearing. She has tried ice, NSAIDs and heat (aleve ) for the symptoms. The treatment provided mild relief.        The following portions of the patient's history were reviewed and updated as appropriate: allergies, current medications and problem list.    Review of Systems   Constitutional: Negative for appetite change, fatigue and fever.   Respiratory: Negative for cough and shortness of breath.    Cardiovascular: Negative for chest pain and leg swelling.   Musculoskeletal: Positive for arthralgias (left knee ). Negative for joint swelling.   Neurological: Negative for tingling, weakness and numbness.       Objective   Physical Exam   Constitutional: She is oriented to person, place, and time. She appears well-developed and well-nourished.   HENT:   Head: Normocephalic.   Nose: Nose normal.   Cardiovascular: Regular rhythm and normal heart sounds.  Exam reveals no S3 and no S4.    No murmur heard.  Pulmonary/Chest: Effort normal and breath sounds normal. She has no decreased breath sounds. She has no wheezes. She has no rhonchi. She has no rales.   Musculoskeletal: She exhibits no edema.        Right knee: She exhibits normal range of motion and no swelling.        Left knee: She exhibits  decreased range of motion and swelling (trace ). She exhibits no ecchymosis and no erythema. Tenderness found.   Neurological: She is alert and oriented to person, place, and time. Gait normal.   Skin: Skin is warm and dry.   Psychiatric: She has a normal mood and affect.       Assessment/Plan   Deja was seen today for knee pain.    Diagnoses and all orders for this visit:    Acute pain of left knee  Comments:  rest, ice compress, elevated   Orders:  -     XR Knee AP & Lateral  -     MethylPREDNISolone (MEDROL) 4 MG tablet; follow package directions    Osteoarthritis of both knees, unspecified osteoarthritis type    Xray with no acute findings. No comparison film available. Sent for final review. She will schedule follow up with her ortho specialist. Patient instructed no other NSAIDs while on steroid. She is headed out of town this Friday with her family.

## 2017-08-22 ENCOUNTER — HOSPITAL ENCOUNTER (EMERGENCY)
Facility: HOSPITAL | Age: 72
Discharge: HOME | End: 2017-08-22
Attending: EMERGENCY MEDICINE | Admitting: EMERGENCY MEDICINE

## 2017-08-22 ENCOUNTER — APPOINTMENT (OUTPATIENT)
Dept: GENERAL RADIOLOGY | Facility: HOSPITAL | Age: 72
End: 2017-08-22

## 2017-08-22 VITALS
SYSTOLIC BLOOD PRESSURE: 112 MMHG | RESPIRATION RATE: 16 BRPM | TEMPERATURE: 98.5 F | BODY MASS INDEX: 26.49 KG/M2 | HEART RATE: 82 BPM | DIASTOLIC BLOOD PRESSURE: 78 MMHG | OXYGEN SATURATION: 97 % | WEIGHT: 159 LBS | HEIGHT: 65 IN

## 2017-08-22 DIAGNOSIS — R10.13 EPIGASTRIC PAIN: Primary | ICD-10-CM

## 2017-08-22 LAB
ALBUMIN SERPL-MCNC: 4.2 G/DL (ref 3.5–5.2)
ALBUMIN/GLOB SERPL: 1.5 G/DL
ALP SERPL-CCNC: 100 U/L (ref 39–117)
ALT SERPL W P-5'-P-CCNC: 16 U/L (ref 1–33)
ANION GAP SERPL CALCULATED.3IONS-SCNC: 15.6 MMOL/L
AST SERPL-CCNC: 19 U/L (ref 1–32)
BACTERIA UR QL AUTO: NORMAL /HPF
BASOPHILS # BLD AUTO: 0.05 10*3/MM3 (ref 0–0.2)
BASOPHILS NFR BLD AUTO: 0.5 % (ref 0–1.5)
BILIRUB SERPL-MCNC: 0.5 MG/DL (ref 0.1–1.2)
BILIRUB UR QL STRIP: NEGATIVE
BUN BLD-MCNC: 12 MG/DL (ref 8–23)
BUN/CREAT SERPL: 16.4 (ref 7–25)
CALCIUM SPEC-SCNC: 9.5 MG/DL (ref 8.6–10.5)
CHLORIDE SERPL-SCNC: 97 MMOL/L (ref 98–107)
CLARITY UR: CLEAR
CO2 SERPL-SCNC: 24.4 MMOL/L (ref 22–29)
COLOR UR: YELLOW
CREAT BLD-MCNC: 0.73 MG/DL (ref 0.57–1)
DEPRECATED RDW RBC AUTO: 41.5 FL (ref 37–54)
EOSINOPHIL # BLD AUTO: 0.32 10*3/MM3 (ref 0–0.7)
EOSINOPHIL NFR BLD AUTO: 3.3 % (ref 0.3–6.2)
ERYTHROCYTE [DISTWIDTH] IN BLOOD BY AUTOMATED COUNT: 12.4 % (ref 11.7–13)
GFR SERPL CREATININE-BSD FRML MDRD: 79 ML/MIN/1.73
GLOBULIN UR ELPH-MCNC: 2.8 GM/DL
GLUCOSE BLD-MCNC: 201 MG/DL (ref 65–99)
GLUCOSE UR STRIP-MCNC: NEGATIVE MG/DL
HCT VFR BLD AUTO: 40.7 % (ref 35.6–45.5)
HGB BLD-MCNC: 13.3 G/DL (ref 11.9–15.5)
HGB UR QL STRIP.AUTO: NEGATIVE
HOLD SPECIMEN: NORMAL
HOLD SPECIMEN: NORMAL
HYALINE CASTS UR QL AUTO: NORMAL /LPF
IMM GRANULOCYTES # BLD: 0.02 10*3/MM3 (ref 0–0.03)
IMM GRANULOCYTES NFR BLD: 0.2 % (ref 0–0.5)
KETONES UR QL STRIP: NEGATIVE
LEUKOCYTE ESTERASE UR QL STRIP.AUTO: ABNORMAL
LIPASE SERPL-CCNC: 31 U/L (ref 13–60)
LYMPHOCYTES # BLD AUTO: 3.33 10*3/MM3 (ref 0.9–4.8)
LYMPHOCYTES NFR BLD AUTO: 33.9 % (ref 19.6–45.3)
MCH RBC QN AUTO: 29.9 PG (ref 26.9–32)
MCHC RBC AUTO-ENTMCNC: 32.7 G/DL (ref 32.4–36.3)
MCV RBC AUTO: 91.5 FL (ref 80.5–98.2)
MONOCYTES # BLD AUTO: 0.53 10*3/MM3 (ref 0.2–1.2)
MONOCYTES NFR BLD AUTO: 5.4 % (ref 5–12)
NEUTROPHILS # BLD AUTO: 5.58 10*3/MM3 (ref 1.9–8.1)
NEUTROPHILS NFR BLD AUTO: 56.7 % (ref 42.7–76)
NITRITE UR QL STRIP: NEGATIVE
PH UR STRIP.AUTO: 6 [PH] (ref 5–8)
PLATELET # BLD AUTO: 353 10*3/MM3 (ref 140–500)
PMV BLD AUTO: 10.1 FL (ref 6–12)
POTASSIUM BLD-SCNC: 3.7 MMOL/L (ref 3.5–5.2)
PROT SERPL-MCNC: 7 G/DL (ref 6–8.5)
PROT UR QL STRIP: NEGATIVE
RBC # BLD AUTO: 4.45 10*6/MM3 (ref 3.9–5.2)
RBC # UR: NORMAL /HPF
REF LAB TEST METHOD: NORMAL
SODIUM BLD-SCNC: 137 MMOL/L (ref 136–145)
SP GR UR STRIP: 1.01 (ref 1–1.03)
SQUAMOUS #/AREA URNS HPF: NORMAL /HPF
TROPONIN T SERPL-MCNC: <0.01 NG/ML (ref 0–0.03)
TROPONIN T SERPL-MCNC: <0.01 NG/ML (ref 0–0.03)
UROBILINOGEN UR QL STRIP: ABNORMAL
WBC NRBC COR # BLD: 9.83 10*3/MM3 (ref 4.5–10.7)
WBC UR QL AUTO: NORMAL /HPF
WHOLE BLOOD HOLD SPECIMEN: NORMAL
WHOLE BLOOD HOLD SPECIMEN: NORMAL

## 2017-08-22 PROCEDURE — 36415 COLL VENOUS BLD VENIPUNCTURE: CPT | Performed by: EMERGENCY MEDICINE

## 2017-08-22 PROCEDURE — 84484 ASSAY OF TROPONIN QUANT: CPT | Performed by: EMERGENCY MEDICINE

## 2017-08-22 PROCEDURE — 81001 URINALYSIS AUTO W/SCOPE: CPT | Performed by: EMERGENCY MEDICINE

## 2017-08-22 PROCEDURE — 93005 ELECTROCARDIOGRAM TRACING: CPT

## 2017-08-22 PROCEDURE — 80053 COMPREHEN METABOLIC PANEL: CPT | Performed by: EMERGENCY MEDICINE

## 2017-08-22 PROCEDURE — 99284 EMERGENCY DEPT VISIT MOD MDM: CPT

## 2017-08-22 PROCEDURE — 83690 ASSAY OF LIPASE: CPT | Performed by: EMERGENCY MEDICINE

## 2017-08-22 PROCEDURE — 71020 HC CHEST PA AND LATERAL: CPT

## 2017-08-22 PROCEDURE — 36415 COLL VENOUS BLD VENIPUNCTURE: CPT

## 2017-08-22 PROCEDURE — 93010 ELECTROCARDIOGRAM REPORT: CPT | Performed by: INTERNAL MEDICINE

## 2017-08-22 PROCEDURE — 85025 COMPLETE CBC W/AUTO DIFF WBC: CPT | Performed by: EMERGENCY MEDICINE

## 2017-08-22 RX ORDER — ALUMINA, MAGNESIA, AND SIMETHICONE 2400; 2400; 240 MG/30ML; MG/30ML; MG/30ML
15 SUSPENSION ORAL ONCE
Status: COMPLETED | OUTPATIENT
Start: 2017-08-22 | End: 2017-08-22

## 2017-08-22 RX ORDER — SODIUM CHLORIDE 0.9 % (FLUSH) 0.9 %
10 SYRINGE (ML) INJECTION AS NEEDED
Status: DISCONTINUED | OUTPATIENT
Start: 2017-08-22 | End: 2017-08-22 | Stop reason: HOSPADM

## 2017-08-22 RX ORDER — LORAZEPAM 1 MG/1
0.5 TABLET ORAL EVERY 6 HOURS PRN
Status: DISCONTINUED | OUTPATIENT
Start: 2017-08-22 | End: 2017-08-22 | Stop reason: HOSPADM

## 2017-08-22 RX ADMIN — LIDOCAINE HYDROCHLORIDE 15 ML: 20 SOLUTION ORAL; TOPICAL at 19:26

## 2017-08-22 RX ADMIN — LORAZEPAM 0.5 MG: 1 TABLET ORAL at 19:23

## 2017-08-22 RX ADMIN — ALUMINUM HYDROXIDE, MAGNESIUM HYDROXIDE, AND DIMETHICONE 15 ML: 400; 400; 40 SUSPENSION ORAL at 19:26

## 2017-08-22 NOTE — ED PROVIDER NOTES
"EMERGENCY DEPARTMENT ENCOUNTER    CHIEF COMPLAINT  Chief Complaint: Abdominal Pain  History given by:Patient  History limited by:Nothing  Room Number: 02/02  PMD: Vicky Smiley MD      HPI:  Pt is a 72 y.o. female who presents to the ED with atraumatic epigastric \"pressure\" which began two days ago while she was at rest. The patient reports that she has also experienced mild dyspnea at rest and she notes that she's also felt more anxious than her baseline. She denies any knowledge of exacerbating or alleviating factors of the abdominal discomfort. Patient denies any increased belching and she also denies any chest pain, nausea, diaphoresis or diarrhea and she has no other complaints at this time. Patient notes that she came to the ED to rule out MI.     Duration: 2 days  Timing:Constant  Location:Epigastric  Radiation:None  Quality:Pressure  Intensity/Severity:Moderate  Progression:No Change  Associated Symptoms:Abdominal pain, SOA, anxious   Aggravating Factors:None  Alleviating Factors:None  Previous Episodes:None  Treatment before arrival:None mentioned      MEDICAL RECORD REVIEW  H/o GERD on Pepcid. Also has h/o HTN and presents with upper abdominal pain. Stress test 2/8/17 EF >70%    PAST MEDICAL HISTORY  Active Ambulatory Problems     Diagnosis Date Noted   • Hyperlipidemia    • Osteoarthritis of knee 07/08/2015   • Preop cardiovascular exam 02/08/2017   • Abnormal ECG 02/08/2017   • Knee pain 07/08/2015     Resolved Ambulatory Problems     Diagnosis Date Noted   • Elevated blood pressure      Past Medical History:   Diagnosis Date   • Anxiety    • Depression    • GERD (gastroesophageal reflux disease)    • Hyperlipidemia    • Scoliosis        PAST SURGICAL HISTORY  Past Surgical History:   Procedure Laterality Date   • LIPOMA EXCISION      from shoulder       FAMILY HISTORY  Family History   Problem Relation Age of Onset   • Hypertension Mother    • Diabetes Father    • Hypertension Father        SOCIAL " HISTORY  Social History     Social History   • Marital status:      Spouse name: N/A   • Number of children: N/A   • Years of education: N/A     Occupational History   • Not on file.     Social History Main Topics   • Smoking status: Former Smoker     Packs/day: 1.00     Years: 5.00     Types: Cigarettes     Quit date: 1960   • Smokeless tobacco: Not on file      Comment: CAFFEINE USE   • Alcohol use No   • Drug use: No   • Sexual activity: Not on file     Other Topics Concern   • Not on file     Social History Narrative       ALLERGIES  Review of patient's allergies indicates no known allergies.    REVIEW OF SYSTEMS  Review of Systems   Constitutional: Negative.  Negative for chills and fever.   HENT: Negative.  Negative for sore throat.    Eyes: Negative.    Respiratory: Positive for shortness of breath. Negative for cough.    Cardiovascular: Negative.  Negative for chest pain.   Gastrointestinal: Positive for abdominal pain.   Genitourinary: Negative.  Negative for dysuria.   Musculoskeletal: Negative.  Negative for back pain.   Skin: Negative.  Negative for rash.   Neurological: Negative.  Negative for headaches.   Psychiatric/Behavioral: The patient is nervous/anxious.        PHYSICAL EXAM  ED Triage Vitals   Temp Heart Rate Resp BP SpO2   08/22/17 1800 08/22/17 1800 08/22/17 1800 08/22/17 1800 08/22/17 1800   98.5 °F (36.9 °C) 109 20 179/93 97 %      Temp src Heart Rate Source Patient Position BP Location FiO2 (%)   08/22/17 1800 08/22/17 1800 08/22/17 1800 08/22/17 1800 --   Tympanic Monitor Sitting Left arm        Physical Exam   Constitutional: She is oriented to person, place, and time and well-developed, well-nourished, and in no distress. No distress.   HENT:   Head: Normocephalic and atraumatic.   Mouth/Throat: Oropharynx is clear and moist.   Eyes: EOM are normal.   Neck: Normal range of motion. Neck supple.   Cardiovascular: Normal rate and regular rhythm.    Pulmonary/Chest: Effort normal and  breath sounds normal. No respiratory distress. She has no wheezes. She exhibits no tenderness.   Abdominal: Soft. She exhibits no distension. There is tenderness in the epigastric area. There is no rebound.   Musculoskeletal: Normal range of motion. She exhibits no edema.   Lymphadenopathy:     She has no cervical adenopathy.   Neurological: She is alert and oriented to person, place, and time.   Skin: Skin is warm and dry. No rash noted. No pallor.   Psychiatric: Mood, memory, affect and judgment normal. Her mood appears anxious.   Nursing note and vitals reviewed.      LAB RESULTS  Recent Results (from the past 24 hour(s))   Comprehensive Metabolic Panel    Collection Time: 08/22/17  6:10 PM   Result Value Ref Range    Glucose 201 (H) 65 - 99 mg/dL    BUN 12 8 - 23 mg/dL    Creatinine 0.73 0.57 - 1.00 mg/dL    Sodium 137 136 - 145 mmol/L    Potassium 3.7 3.5 - 5.2 mmol/L    Chloride 97 (L) 98 - 107 mmol/L    CO2 24.4 22.0 - 29.0 mmol/L    Calcium 9.5 8.6 - 10.5 mg/dL    Total Protein 7.0 6.0 - 8.5 g/dL    Albumin 4.20 3.50 - 5.20 g/dL    ALT (SGPT) 16 1 - 33 U/L    AST (SGOT) 19 1 - 32 U/L    Alkaline Phosphatase 100 39 - 117 U/L    Total Bilirubin 0.5 0.1 - 1.2 mg/dL    eGFR Non African Amer 79 >60 mL/min/1.73    Globulin 2.8 gm/dL    A/G Ratio 1.5 g/dL    BUN/Creatinine Ratio 16.4 7.0 - 25.0    Anion Gap 15.6 mmol/L   Lipase    Collection Time: 08/22/17  6:10 PM   Result Value Ref Range    Lipase 31 13 - 60 U/L   Troponin    Collection Time: 08/22/17  6:10 PM   Result Value Ref Range    Troponin T <0.010 0.000 - 0.030 ng/mL   Light Blue Top    Collection Time: 08/22/17  6:10 PM   Result Value Ref Range    Extra Tube hold for add-on    Green Top (Gel)    Collection Time: 08/22/17  6:10 PM   Result Value Ref Range    Extra Tube Hold for add-ons.    Lavender Top    Collection Time: 08/22/17  6:10 PM   Result Value Ref Range    Extra Tube hold for add-on    Gold Top - SST    Collection Time: 08/22/17  6:10 PM    Result Value Ref Range    Extra Tube Hold for add-ons.    CBC Auto Differential    Collection Time: 08/22/17  6:10 PM   Result Value Ref Range    WBC 9.83 4.50 - 10.70 10*3/mm3    RBC 4.45 3.90 - 5.20 10*6/mm3    Hemoglobin 13.3 11.9 - 15.5 g/dL    Hematocrit 40.7 35.6 - 45.5 %    MCV 91.5 80.5 - 98.2 fL    MCH 29.9 26.9 - 32.0 pg    MCHC 32.7 32.4 - 36.3 g/dL    RDW 12.4 11.7 - 13.0 %    RDW-SD 41.5 37.0 - 54.0 fl    MPV 10.1 6.0 - 12.0 fL    Platelets 353 140 - 500 10*3/mm3    Neutrophil % 56.7 42.7 - 76.0 %    Lymphocyte % 33.9 19.6 - 45.3 %    Monocyte % 5.4 5.0 - 12.0 %    Eosinophil % 3.3 0.3 - 6.2 %    Basophil % 0.5 0.0 - 1.5 %    Immature Grans % 0.2 0.0 - 0.5 %    Neutrophils, Absolute 5.58 1.90 - 8.10 10*3/mm3    Lymphocytes, Absolute 3.33 0.90 - 4.80 10*3/mm3    Monocytes, Absolute 0.53 0.20 - 1.20 10*3/mm3    Eosinophils, Absolute 0.32 0.00 - 0.70 10*3/mm3    Basophils, Absolute 0.05 0.00 - 0.20 10*3/mm3    Immature Grans, Absolute 0.02 0.00 - 0.03 10*3/mm3   Urinalysis With / Culture If Indicated    Collection Time: 08/22/17  7:01 PM   Result Value Ref Range    Color, UA Yellow Yellow, Straw    Appearance, UA Clear Clear    pH, UA 6.0 5.0 - 8.0    Specific Gravity, UA 1.011 1.005 - 1.030    Glucose, UA Negative Negative    Ketones, UA Negative Negative    Bilirubin, UA Negative Negative    Blood, UA Negative Negative    Protein, UA Negative Negative    Leuk Esterase, UA Trace (A) Negative    Nitrite, UA Negative Negative    Urobilinogen, UA 0.2 E.U./dL 0.2 - 1.0 E.U./dL   Urinalysis, Microscopic Only    Collection Time: 08/22/17  7:01 PM   Result Value Ref Range    RBC, UA 0-2 None Seen, 0-2 /HPF    WBC, UA 0-2 None Seen, 0-2 /HPF    Bacteria, UA None Seen None Seen /HPF    Squamous Epithelial Cells, UA 0-2 None Seen, 0-2 /HPF    Hyaline Casts, UA 0-2 None Seen /LPF    Methodology Automated Microscopy    Troponin    Collection Time: 08/22/17  8:20 PM   Result Value Ref Range    Troponin T <0.010  0.000 - 0.030 ng/mL       I ordered the above labs and reviewed the results    RADIOLOGY  XR Chest 2 View   Final Result        Reviewed CXR - The lungs are well-expanded and clear. There are several calcified left hilar lymph nodes. There is deformity of the chest related to a moderate S-shaped thoracolumbar scoliosis. Allowing for this deformity, the heart is top normal in size and the overall appearance shows no change from 04/19/2012. Independently viewed by me. Interpreted by radiologist.     I ordered the above noted radiological studies and reviewed the images on the PACS system.       EKG    ekg was interpreted by Dr. Obando      COURSE & MEDICAL DECISION MAKING  Pertinent Labs and Imaging studies that were ordered and reviewed are noted above.  Results were reviewed/discussed with the patient and they were also made aware of online assess.  Pt also made aware that some labs, such as cultures, will not be resulted during ER visit and follow up with PMD is necessary.       PROGRESS AND CONSULTS    Progress Notes:    1931- Reviewed pt's history and workup with Dr. Obando.  After a bedside evaluation; Dr Obando agrees with the plan of care    2100- Rechecked patient.  Reassured her with her normal troponin and repeat troponin.  The patient's history, physical exam,imaging studies and lab findings were discussed with the physician, who also performed a face to face history and physical exam.  I discussed all results and noted any abnormalities with patient.  Discussed absoute need to recheck abnormalities with their family physician.  I answered any of the patient's questions.  Discussed plan for discharge, as there is no emergent indication for admission.  Pt is agreeable and understands need for follow up and repeat testing.  Pt is aware that discharge does not mean that nothing is wrong but it indicates no emergency is present and they must continue care with their family physician.  Pt is discharged with  "instructions to follow up with primary care doctor to have their blood pressure rechecked.       MEDICATIONS GIVEN IN ER  Medications   sodium chloride 0.9 % flush 10 mL (not administered)   LORazepam (ATIVAN) tablet 0.5 mg (0.5 mg Oral Given 8/22/17 1923)   aluminum-magnesium hydroxide-simethicone (MAALOX MAX) 400-400-40 MG/5ML suspension 15 mL (15 mL Oral Given 8/22/17 1926)   lidocaine viscous (XYLOCAINE) 2 % mouth solution 15 mL (15 mL Mouth/Throat Given 8/22/17 1926)       /81  Pulse 82  Temp 98.5 °F (36.9 °C) (Tympanic)   Resp 20  Ht 65\" (165.1 cm)  Wt 159 lb (72.1 kg)  SpO2 100%  BMI 26.46 kg/m2      DIAGNOSIS  Final diagnoses:   Epigastric pain       FOLLOW UP   Vicky Smiley MD  [address] [address]  [phone]            RX     Medication List      Stop          atorvastatin 40 MG tablet   Commonly known as:  LIPITOR       MethylPREDNISolone 4 MG tablet   Commonly known as:  MEDROL         Documentation assistance provided by sanjeev Bain for Belen Schwab PA-C.  Information recorded by the sanjeev was done at my direction and has been verified and validated by me.     Manuel Bain  08/22/17 2102       Belen Schwab PA-C  08/22/17 2108       Belen Schwab PA-C  10/05/17 1437    "

## 2017-08-22 NOTE — ED PROVIDER NOTES
Pt c/o upper abdominal pain which began earlier this morning. Pt states that she has a hernia, but it does not usuall bother her. Pt also c/o anxiousness and SOA. Pt denies N/V, diaphoresis and cp. Pt had unremarkable labs. Chest XR showed nothing acute. Agree with plan to perform repeat troponin.     PEx  Pt appeared anxious  Heart: RRR  Lungs: clear  Abdomen:Mild epigastric tenderness  No pedal edema, no calf tenderness    EKG           EKG time: 1805  Rhythm/Rate: NSR, rate 98  P waves and LA: normal  QRS, axis: inferior Q waves   ST and T waves: non specific T wave changes     Interpreted Contemporaneously by me, independently viewed  unchanged compared to prior 6/19/2008      I supervised care provided by the midlevel provider.    We have discussed this patient's history, physical exam, and treatment plan.   I have reviewed the note and personally saw and examined the patient and agree with the plan of care.    Documentation assistance provided by sanjeev Valladares for Dr. Obando.  Information recorded by the scribe was done at my direction and has been verified and validated by me.       Vicky Valladares  08/22/17 1942       Teja Obando MD  08/22/17 2057

## 2017-08-23 NOTE — DISCHARGE INSTRUCTIONS
PLEASE READ AND REVIEW ALL DISCHARGE INSTRUCTIONS.     Please follow up with your primary care physician for any further evaluation/treatment and further management of your blood pressure.     Recheck in emergency department for any worsening and/or concerning symptoms.     Take all prescribed medicine as written and continue chronic medication.

## 2017-09-01 ENCOUNTER — OFFICE VISIT (OUTPATIENT)
Dept: INTERNAL MEDICINE | Facility: CLINIC | Age: 72
End: 2017-09-01

## 2017-09-01 VITALS
HEIGHT: 65 IN | WEIGHT: 162 LBS | DIASTOLIC BLOOD PRESSURE: 92 MMHG | SYSTOLIC BLOOD PRESSURE: 144 MMHG | BODY MASS INDEX: 26.99 KG/M2

## 2017-09-01 DIAGNOSIS — F41.9 ANXIETY: Primary | ICD-10-CM

## 2017-09-01 PROCEDURE — 99213 OFFICE O/P EST LOW 20 MIN: CPT | Performed by: INTERNAL MEDICINE

## 2017-09-01 RX ORDER — DULOXETIN HYDROCHLORIDE 30 MG/1
30 CAPSULE, DELAYED RELEASE ORAL DAILY
Qty: 30 CAPSULE | Refills: 2 | Status: SHIPPED | OUTPATIENT
Start: 2017-09-01 | End: 2018-02-11 | Stop reason: SDUPTHER

## 2017-09-01 NOTE — PROGRESS NOTES
"Chief Complaint   Patient presents with   • Anxiety     had anxiety attck and went to ER   • Fatigue        Subjective   Deja Juárez is a 72 y.o. female     HPI: She comes in for follow-up of anxiety.  A week ago Tuesday, she had a panic attack. She was very short of breath.  She went to the emergency room.  She was given a \"gi cocktail\" and 1/2 of an ativan.  She worries about her , , children (2) grandchildren (7).  recently had MI and heart surgery.  She worries about things a lot.  She has difficulty coping at times.      The following portions of the patient's history were reviewed and updated as appropriate: allergies, current medications, past social history, problem list, past surgical history    Review of Systems   Constitutional: Positive for fatigue.   Psychiatric/Behavioral: Negative for decreased concentration, dysphoric mood, self-injury and suicidal ideas. The patient is nervous/anxious.            Objective     /92  Ht 65\" (165.1 cm)  Wt 162 lb (73.5 kg)  BMI 26.96 kg/m2     Physical Exam   Psychiatric: Her speech is normal and behavior is normal. Judgment and thought content normal. Her mood appears anxious. Cognition and memory are normal. She is attentive.       Assessment/Plan   Problem List Items Addressed This Visit     None      Visit Diagnoses     Anxiety    -  Primary    Relevant Medications    DULoxetine (CYMBALTA) 30 MG capsule        Discussed anxiety with her.  She has symptoms daily.  Advised her an antidepressant would be the most logical option for treatment.  She wonders about taking Valium.  Explained that it was a controlled substance, had risks of dependence and addiction.  15 minutes spent with her discussing options.    "

## 2017-10-19 ENCOUNTER — OFFICE VISIT (OUTPATIENT)
Dept: INTERNAL MEDICINE | Facility: CLINIC | Age: 72
End: 2017-10-19

## 2017-10-19 VITALS
DIASTOLIC BLOOD PRESSURE: 86 MMHG | HEIGHT: 65 IN | SYSTOLIC BLOOD PRESSURE: 122 MMHG | HEART RATE: 73 BPM | OXYGEN SATURATION: 94 % | WEIGHT: 158 LBS | BODY MASS INDEX: 26.33 KG/M2

## 2017-10-19 DIAGNOSIS — Z23 NEED FOR INFLUENZA VACCINATION: ICD-10-CM

## 2017-10-19 DIAGNOSIS — Z79.899 ENCOUNTER FOR MEDICATION MANAGEMENT: ICD-10-CM

## 2017-10-19 DIAGNOSIS — Z00.00 MEDICARE ANNUAL WELLNESS VISIT, SUBSEQUENT: Primary | ICD-10-CM

## 2017-10-19 DIAGNOSIS — E78.5 HYPERLIPIDEMIA, UNSPECIFIED HYPERLIPIDEMIA TYPE: ICD-10-CM

## 2017-10-19 DIAGNOSIS — Z12.11 SCREENING FOR COLON CANCER: ICD-10-CM

## 2017-10-19 LAB
ALBUMIN SERPL-MCNC: 4.4 G/DL (ref 3.5–5.2)
ALBUMIN/GLOB SERPL: 1.4 G/DL
ALP SERPL-CCNC: 110 U/L (ref 39–117)
ALT SERPL W P-5'-P-CCNC: 21 U/L (ref 1–33)
ANION GAP SERPL CALCULATED.3IONS-SCNC: 13.6 MMOL/L
AST SERPL-CCNC: 20 U/L (ref 1–32)
BILIRUB SERPL-MCNC: 1 MG/DL (ref 0.1–1.2)
BUN BLD-MCNC: 14 MG/DL (ref 8–23)
BUN/CREAT SERPL: 20.6 (ref 7–25)
CALCIUM SPEC-SCNC: 10.4 MG/DL (ref 8.6–10.5)
CHLORIDE SERPL-SCNC: 96 MMOL/L (ref 98–107)
CHOLEST SERPL-MCNC: 215 MG/DL (ref 0–200)
CO2 SERPL-SCNC: 27.4 MMOL/L (ref 22–29)
CREAT BLD-MCNC: 0.68 MG/DL (ref 0.57–1)
DEPRECATED RDW RBC AUTO: 40.3 FL (ref 37–54)
ERYTHROCYTE [DISTWIDTH] IN BLOOD BY AUTOMATED COUNT: 12.4 % (ref 11.5–15)
GFR SERPL CREATININE-BSD FRML MDRD: 85 ML/MIN/1.73
GLOBULIN UR ELPH-MCNC: 3.2 GM/DL
GLUCOSE BLD-MCNC: 93 MG/DL (ref 65–99)
HCT VFR BLD AUTO: 44.6 % (ref 34.1–44.9)
HDLC SERPL-MCNC: 55 MG/DL (ref 40–60)
HGB BLD-MCNC: 14.3 G/DL (ref 11.2–15.7)
LDLC SERPL CALC-MCNC: 116 MG/DL (ref 0–100)
LDLC/HDLC SERPL: 2.12 {RATIO}
MCH RBC QN AUTO: 29.2 PG (ref 26–34)
MCHC RBC AUTO-ENTMCNC: 32.1 G/DL (ref 31–37)
MCV RBC AUTO: 91.2 FL (ref 80–100)
PLATELET # BLD AUTO: 411 10*3/MM3 (ref 150–450)
PMV BLD AUTO: 10.1 FL (ref 6–12)
POTASSIUM BLD-SCNC: 5.2 MMOL/L (ref 3.5–5.2)
PROT SERPL-MCNC: 7.6 G/DL (ref 6–8.5)
RBC # BLD AUTO: 4.89 10*6/MM3 (ref 3.93–5.22)
SODIUM BLD-SCNC: 137 MMOL/L (ref 136–145)
TRIGL SERPL-MCNC: 218 MG/DL (ref 0–150)
VLDLC SERPL-MCNC: 43.6 MG/DL (ref 5–40)
WBC NRBC COR # BLD: 7.21 10*3/MM3 (ref 5–10)

## 2017-10-19 PROCEDURE — 90662 IIV NO PRSV INCREASED AG IM: CPT | Performed by: INTERNAL MEDICINE

## 2017-10-19 PROCEDURE — 80061 LIPID PANEL: CPT | Performed by: INTERNAL MEDICINE

## 2017-10-19 PROCEDURE — 99213 OFFICE O/P EST LOW 20 MIN: CPT | Performed by: INTERNAL MEDICINE

## 2017-10-19 PROCEDURE — 85027 COMPLETE CBC AUTOMATED: CPT | Performed by: INTERNAL MEDICINE

## 2017-10-19 PROCEDURE — G0439 PPPS, SUBSEQ VISIT: HCPCS | Performed by: INTERNAL MEDICINE

## 2017-10-19 PROCEDURE — 36415 COLL VENOUS BLD VENIPUNCTURE: CPT | Performed by: INTERNAL MEDICINE

## 2017-10-19 PROCEDURE — G0008 ADMIN INFLUENZA VIRUS VAC: HCPCS | Performed by: INTERNAL MEDICINE

## 2017-10-19 PROCEDURE — 80053 COMPREHEN METABOLIC PANEL: CPT | Performed by: INTERNAL MEDICINE

## 2017-10-19 NOTE — PROGRESS NOTES
QUICK REFERENCE INFORMATION:  The ABCs of the Annual Wellness Visit    Subsequent Medicare Wellness Visit    HEALTH RISK ASSESSMENT    1945    Recent Hospitalizations:  No hospitalization(s) within the last year..        Current Medical Providers:  Patient Care Team:  Vicky Smiley MD as PCP - General (Internal Medicine)  Vicky Smiley MD as PCP - Claims Attributed  FRIDA Marie as Nurse Practitioner (Internal Medicine)        Smoking Status:  History   Smoking Status   • Former Smoker   • Packs/day: 1.00   • Years: 5.00   • Types: Cigarettes   • Quit date: 1960   Smokeless Tobacco   • Never Used     Comment: CAFFEINE USE       Alcohol Consumption:  History   Alcohol Use No       Depression Screen:   PHQ-2/PHQ-9 Depression Screening 10/19/2017   Little interest or pleasure in doing things 0   Feeling down, depressed, or hopeless 0   Trouble falling or staying asleep, or sleeping too much 0   Feeling tired or having little energy 0   Poor appetite or overeating 0   Feeling bad about yourself - or that you are a failure or have let yourself or your family down 0   Trouble concentrating on things, such as reading the newspaper or watching television 0   Moving or speaking so slowly that other people could have noticed. Or the opposite - being so fidgety or restless that you have been moving around a lot more than usual 0   Thoughts that you would be better off dead, or of hurting yourself in some way 0   Total Score 0   If you checked off any problems, how difficult have these problems made it for you to do your work, take care of things at home, or get along with other people? Not difficult at all       Health Habits and Functional and Cognitive Screening:  Functional & Cognitive Status 10/19/2017   Do you have difficulty preparing food and eating? No   Do you have difficulty bathing yourself? No   Do you have difficulty getting dressed? No   Do you have difficulty using the toilet? No   Do you have  difficulty moving around from place to place? No   In the past year have you fallen or experienced a near fall? No   Do you need help using the phone?  No   Are you deaf or do you have serious difficulty hearing?  No   Do you need help with transportation? No   Do you need help shopping? No   Do you need help preparing meals?  No   Do you need help with housework?  No   Do you need help with laundry? No   Do you need help taking your medications? No   Do you need help managing money? No   Do you have difficulty concentrating, remembering or making decisions? No       Health Habits  Current Diet: Well Balanced Diet  Dental Exam: Up to date  Eye Exam: Up to date  Exercise (times per week): 1 times per week  Current Exercise Activities Include:  (biking)      Does the patient have evidence of cognitive impairment? No    Aspirin use counseling: Does not need ASA (and currently is not on it)      Recent Lab Results:  CMP:  Lab Results   Component Value Date    GLU 94 04/14/2017    BUN 12 08/22/2017    CREATININE 0.73 08/22/2017    EGFRIFNONA 79 08/22/2017    EGFRIFAFRI 94 04/14/2017    BCR 16.4 08/22/2017     08/22/2017    K 3.7 08/22/2017    CO2 24.4 08/22/2017    CALCIUM 9.5 08/22/2017    PROTENTOTREF 7.7 04/14/2017    ALBUMIN 4.20 08/22/2017    LABGLOBREF 3.0 04/14/2017    LABIL2 1.5 08/22/2017    BILITOT 0.5 08/22/2017    ALKPHOS 100 08/22/2017    AST 19 08/22/2017    ALT 16 08/22/2017     Lipid Panel:  Lab Results   Component Value Date    CHOL 267 (H) 09/01/2016    TRIG 220 (H) 04/14/2017    HDL 55 04/14/2017    VLDL 44 (H) 04/14/2017    LDLCALC 168 (H) 09/01/2016    LDLHDL 3.16 09/01/2016     HbA1c:       Visual Acuity:  No exam data present    Age-appropriate Screening Schedule:  Refer to the list below for future screening recommendations based on patient's age, sex and/or medical conditions. Orders for these recommended tests are listed in the plan section. The patient has been provided with a written  plan.    Health Maintenance   Topic Date Due   • PNEUMOCOCCAL VACCINES (65+ LOW/MEDIUM RISK) (2 of 2 - PPSV23) 04/25/2013   • COLONOSCOPY  04/13/2016   • ZOSTER VACCINE  04/13/2016   • INFLUENZA VACCINE  08/01/2017   • LIPID PANEL  04/14/2018   • MAMMOGRAM  04/14/2019   • TDAP/TD VACCINES (2 - Td) 04/25/2022        Subjective   History of Present Illness    Deja Juárez is a 72 y.o. female who presents for an Subsequent Wellness Visit.    The following portions of the patient's history were reviewed and updated as appropriate: allergies, current medications, past family history, past medical history, past social history, past surgical history and problem list.    Outpatient Medications Prior to Visit   Medication Sig Dispense Refill   • atorvastatin (LIPITOR) 40 MG tablet Take 1 tablet by mouth Daily. 90 tablet 1   • calcium carbonate-vitamin d (CALTRATE 600+D) 600-400 MG-UNIT per tablet Take 1 tablet by mouth.     • Cholecalciferol (VITAMIN D) 1000 UNITS tablet Take 1,000 Units by mouth.     • DULoxetine (CYMBALTA) 30 MG capsule Take 1 capsule by mouth Daily. 30 capsule 2   • famotidine (PEPCID) 20 MG tablet Take 20 mg by mouth Daily.     • Multiple Vitamin (MULTIVITAMIN) tablet Take 1 tablet by mouth.       No facility-administered medications prior to visit.        Patient Active Problem List   Diagnosis   • Hyperlipidemia   • Osteoarthritis of knee   • Preop cardiovascular exam   • Abnormal ECG   • Knee pain         Advance Care Planning:  has an advance directive - a copy HAS NOT been provided. Have asked the patient to send this to us to add to record.    Identification of Risk Factors:  Risk factors include: weight  and cardiovascular risk.    Review of Systems    Compared to one year ago, the patient feels her physical health is the same.  Compared to one year ago, the patient feels her mental health is the same.    Objective     Physical Exam    Vitals:    10/19/17 0819   BP: 122/86   BP Location: Right  "arm   Patient Position: Sitting   Cuff Size: Adult   Pulse: 73   SpO2: 94%   Weight: 158 lb (71.7 kg)   Height: 65\" (165.1 cm)       Body mass index is 26.29 kg/(m^2).  Discussed the patient's BMI with her. The BMI slightly above average.  She will continue prudent diet, regular exercise.    Assessment/Plan   Patient Self-Management and Personalized Health Advice  The patient has been provided with information about: diet, exercise, weight management and prevention of cardiac or vascular disease and preventive services including:   · Colorectal cancer screening, colonoscopy referral placed, Influenza vaccine, Pneumococcal vaccine , TdaP vaccine, Zostavax vaccine (Herpes Zoster).    Visit Diagnoses:  No diagnosis found.    No orders of the defined types were placed in this encounter.      Outpatient Encounter Prescriptions as of 10/19/2017   Medication Sig Dispense Refill   • atorvastatin (LIPITOR) 40 MG tablet Take 1 tablet by mouth Daily. 90 tablet 1   • calcium carbonate-vitamin d (CALTRATE 600+D) 600-400 MG-UNIT per tablet Take 1 tablet by mouth.     • Cholecalciferol (VITAMIN D) 1000 UNITS tablet Take 1,000 Units by mouth.     • DULoxetine (CYMBALTA) 30 MG capsule Take 1 capsule by mouth Daily. 30 capsule 2   • famotidine (PEPCID) 20 MG tablet Take 20 mg by mouth Daily.     • Multiple Vitamin (MULTIVITAMIN) tablet Take 1 tablet by mouth.       No facility-administered encounter medications on file as of 10/19/2017.        Reviewed use of high risk medication in the elderly: not applicable  Reviewed for potential of harmful drug interactions in the elderly: not applicable    Follow Up:  No Follow-up on file.     An After Visit Summary and PPPS with all of these plans were given to the patient.        "

## 2017-10-19 NOTE — PATIENT INSTRUCTIONS
Medicare Wellness  Personal Prevention Plan of Service     Date of Office Visit:  10/19/2017  Encounter Provider:  Vicky Smiley MD  Place of Service:  [facility]  Patient Name: Deja Juárez  :  1945    As part of the Medicare Wellness portion of your visit today, we are providing you with this personalized preventive plan of services (PPPS). This plan is based upon recommendations of the [place] Preventive Services Task Force (USPSTF) and the Advisory Committee on Immunization Practices (ACIP).    This lists the preventive care services that should be considered, and provides dates of when you are due. Items listed as completed are up-to-date and do not require any further intervention.    Health Maintenance   Topic Date Due   • PNEUMOCOCCAL VACCINES (65+ LOW/MEDIUM RISK) (2 of 2 - PPSV23) 2013   • HEPATITIS C SCREENING  2016   • MEDICARE ANNUAL WELLNESS  2016   • COLONOSCOPY  2016   • ZOSTER VACCINE  2016   • INFLUENZA VACCINE  2017   • MAMMOGRAM  2018   • LIPID PANEL  2018   • TDAP/TD VACCINES (2 - Td) 2022     Zoster vaccine is recommended (shingles vaccine) - you may get this at your pharmacy.  Tetanus shot is also recommended.  Tdap is a tetanus booster that includes a booster to diphtheria and pertussis (whooping cough).  It would be a good idea to have this.  You may also get this at your pharmacy.    No orders of the defined types were placed in this encounter.      No Follow-up on file.

## 2017-10-19 NOTE — PROGRESS NOTES
"Chief Complaint   Patient presents with   • Anxiety   • Hyperlipidemia        Subjective   Deja Juárez is a 72 y.o. female     Anxiety   Presents for follow-up visit. Symptoms include excessive worry and nervous/anxious behavior. Patient reports no chest pain, depressed mood, dizziness, palpitations or shortness of breath. Symptoms occur occasionally. The severity of symptoms is mild.     Compliance with medications is %.   Hyperlipidemia   This is a chronic problem. Recent lipid tests were reviewed and are high. She has no history of diabetes or hypothyroidism. Pertinent negatives include no chest pain, leg pain, myalgias or shortness of breath. Current antihyperlipidemic treatment includes statins.   :   Since her last appointment here, she has started duloxetine for anxiety.  This is helping considerably.  She finds that taking it every other day is adequate.  She has also started taking atorvastatin.  She takes a half a tablet daily or every other day.    The following portions of the patient's history were reviewed and updated as appropriate: allergies, current medications, past social history, problem list, past surgical history    Review of Systems   Constitutional: Negative for activity change and appetite change.   HENT: Negative for nosebleeds.    Eyes: Negative for visual disturbance.   Respiratory: Negative for cough and shortness of breath.    Cardiovascular: Negative for chest pain, palpitations and leg swelling.   Musculoskeletal: Negative for myalgias.   Neurological: Negative for dizziness and headaches.   Psychiatric/Behavioral: Negative for sleep disturbance. The patient is nervous/anxious.            Objective     /86 (BP Location: Right arm, Patient Position: Sitting, Cuff Size: Adult)  Pulse 73  Ht 65\" (165.1 cm)  Wt 158 lb (71.7 kg)  SpO2 94%  BMI 26.29 kg/m2     Physical Exam   Constitutional: She is oriented to person, place, and time. She appears well-developed and " well-nourished. No distress.   Neck: Normal carotid pulses present. Carotid bruit is not present.   Cardiovascular: Normal rate, regular rhythm, S1 normal, S2 normal and intact distal pulses.  Exam reveals no gallop and no friction rub.    No murmur heard.  Pulses:       Carotid pulses are 2+ on the right side, and 2+ on the left side.  Pulmonary/Chest: Effort normal and breath sounds normal. No respiratory distress. She has no wheezes. She has no rhonchi. She has no rales. Chest wall is not dull to percussion.   Musculoskeletal: She exhibits no edema.   Neurological: She is alert and oriented to person, place, and time.   Skin: Skin is warm and dry.   Psychiatric: She has a normal mood and affect.   Nursing note and vitals reviewed.      Assessment/Plan   Problem List Items Addressed This Visit        Cardiovascular and Mediastinum    Hyperlipidemia      Other Visit Diagnoses     Medicare annual wellness visit, subsequent    -  Primary    Need for influenza vaccination        Need for vaccination        Screening for colon cancer

## 2017-10-26 DIAGNOSIS — Z13.820 SCREENING FOR OSTEOPOROSIS: Primary | ICD-10-CM

## 2017-10-26 DIAGNOSIS — Z78.0 POST-MENOPAUSAL: ICD-10-CM

## 2017-10-27 ENCOUNTER — OFFICE VISIT (OUTPATIENT)
Dept: INTERNAL MEDICINE | Facility: CLINIC | Age: 72
End: 2017-10-27

## 2017-10-27 VITALS
TEMPERATURE: 98.1 F | HEIGHT: 65 IN | BODY MASS INDEX: 26.99 KG/M2 | DIASTOLIC BLOOD PRESSURE: 64 MMHG | SYSTOLIC BLOOD PRESSURE: 136 MMHG | WEIGHT: 162 LBS

## 2017-10-27 DIAGNOSIS — J06.9 ACUTE URI: Primary | ICD-10-CM

## 2017-10-27 PROCEDURE — 99213 OFFICE O/P EST LOW 20 MIN: CPT | Performed by: INTERNAL MEDICINE

## 2017-10-27 RX ORDER — AZITHROMYCIN 250 MG/1
TABLET, FILM COATED ORAL
Qty: 6 TABLET | Refills: 0 | Status: SHIPPED | OUTPATIENT
Start: 2017-10-27 | End: 2017-11-29

## 2017-10-27 NOTE — PROGRESS NOTES
"Chief Complaint   Patient presents with   • Back Pain     left side of upper back sharp stabbing pain   • sinus drainage     drainage in throat        Subjective   Deja Juárez is a 72 y.o. female     HPI: Back pain:  She woke up this am with her back hurting. Feels like a stabbing, aching pain. It is there constatntly.  She has drainage and starts coughing when she lies down. Her throat feels sore, she goes back and forth between feeling hot then cold.   She has taken nyquil, daquil.  Her symptoms began with drainage a week or 2 ago.  Then she developed a cough.  In the hurting in the left-hand side of her chest this morning.  She sometimes brings up a little yellow phlegm with the cough.      The following portions of the patient's history were reviewed and updated as appropriate: allergies, current medications, past social history, problem list, past surgical history    Review of Systems   Constitutional: Positive for appetite change (a liitle lower) and fatigue.   HENT: Positive for ear pain (left ear hurts a little) and postnasal drip.    Respiratory: Positive for cough. Negative for shortness of breath.    Gastrointestinal: Negative for abdominal pain, diarrhea, nausea and vomiting.   Musculoskeletal: Negative for neck pain.   Skin: Negative for rash.   Neurological: Negative for dizziness.   Hematological: Negative for adenopathy.           Objective     /64  Temp 98.1 °F (36.7 °C)  Ht 65\" (165.1 cm)  Wt 162 lb (73.5 kg)  BMI 26.96 kg/m2     Physical Exam   Constitutional: She appears well-developed and well-nourished. No distress.   HENT:   Right Ear: Tympanic membrane and ear canal normal.   Left Ear: Tympanic membrane and ear canal normal.   Nose: Right sinus exhibits no maxillary sinus tenderness and no frontal sinus tenderness. Left sinus exhibits no maxillary sinus tenderness and no frontal sinus tenderness.   Mouth/Throat: Posterior oropharyngeal erythema present.   Eyes: Conjunctivae are " normal.   Cardiovascular: Normal rate, regular rhythm and normal heart sounds.  Exam reveals no gallop and no friction rub.    No murmur heard.  Pulmonary/Chest: Effort normal and breath sounds normal. No respiratory distress. She has no wheezes. She has no rales.   Lymphadenopathy:     She has no cervical adenopathy.   Skin: No rash noted.   Nursing note and vitals reviewed.      Assessment/Plan   Problem List Items Addressed This Visit     None      Visit Diagnoses     Acute URI    -  Primary    Relevant Medications    azithromycin (ZITHROMAX Z-ELVIRA) 250 MG tablet

## 2017-11-01 ENCOUNTER — CLINICAL SUPPORT (OUTPATIENT)
Dept: INTERNAL MEDICINE | Facility: CLINIC | Age: 72
End: 2017-11-01

## 2017-11-01 DIAGNOSIS — Z78.0 POST-MENOPAUSAL: ICD-10-CM

## 2017-11-01 PROCEDURE — 77080 DXA BONE DENSITY AXIAL: CPT | Performed by: INTERNAL MEDICINE

## 2017-11-29 ENCOUNTER — OFFICE VISIT (OUTPATIENT)
Dept: INTERNAL MEDICINE | Facility: CLINIC | Age: 72
End: 2017-11-29

## 2017-11-29 VITALS
BODY MASS INDEX: 27.16 KG/M2 | WEIGHT: 163 LBS | DIASTOLIC BLOOD PRESSURE: 74 MMHG | HEIGHT: 65 IN | SYSTOLIC BLOOD PRESSURE: 138 MMHG

## 2017-11-29 DIAGNOSIS — Z01.818 PREOP EXAMINATION: Primary | ICD-10-CM

## 2017-11-29 PROCEDURE — 99213 OFFICE O/P EST LOW 20 MIN: CPT | Performed by: INTERNAL MEDICINE

## 2017-11-29 NOTE — PROGRESS NOTES
"Chief Complaint   Patient presents with   • Knee Pain     left knee surgery clearance         Subjective   Deja Juárez is a 72 y.o. female     HPI: She is scheduled for left knee replacement.  She has no current complaints.   She has history of abnormal ECG.  She had cardiology consultation, stress test and echocardiogram this year and was cleared for surgery.      The following portions of the patient's history were reviewed and updated as appropriate: allergies, current medications, past social history, problem list, past surgical history    Review of Systems   Constitutional: Negative for chills, diaphoresis and fever.   HENT: Positive for postnasal drip. Negative for congestion.    Eyes: Negative for visual disturbance.   Respiratory: Negative for cough and shortness of breath.    Cardiovascular: Negative for chest pain, palpitations and leg swelling.   Gastrointestinal: Negative for abdominal pain, constipation, diarrhea, nausea and vomiting.   Endocrine: Negative for polydipsia and polyuria.   Genitourinary: Negative for dysuria.   Musculoskeletal: Positive for arthralgias.   Neurological: Negative for dizziness, syncope and headaches.   Hematological: Negative for adenopathy. Does not bruise/bleed easily.   Psychiatric/Behavioral: Negative for dysphoric mood. The patient is not nervous/anxious.            Objective     /74  Ht 65\" (165.1 cm)  Wt 163 lb (73.9 kg)  BMI 27.12 kg/m2     Physical Exam   Constitutional: She is oriented to person, place, and time. She appears well-developed and well-nourished. No distress.   Eyes: Conjunctivae and EOM are normal. Pupils are equal, round, and reactive to light. No scleral icterus.   Neck: Normal carotid pulses, no hepatojugular reflux and no JVD present. Carotid bruit is not present. No thyromegaly present.   Cardiovascular: Regular rhythm, S1 normal and S2 normal.  Exam reveals no gallop and no friction rub.    No murmur heard.  Pulses:       Carotid " pulses are 2+ on the right side, and 2+ on the left side.  Pulmonary/Chest: Effort normal and breath sounds normal. She has no wheezes. She has no rhonchi. She has no rales. Chest wall is not dull to percussion.   Abdominal: Soft. Normal appearance. There is no hepatosplenomegaly. There is no tenderness. There is no rebound and no guarding.   Musculoskeletal: She exhibits no edema.   Lymphadenopathy:     She has no cervical adenopathy.   Neurological: She is alert and oriented to person, place, and time. No cranial nerve deficit. She displays a negative Romberg sign. Coordination normal.   Skin: Skin is warm and dry.   Psychiatric: She has a normal mood and affect.   Nursing note and vitals reviewed.      Assessment/Plan   Problem List Items Addressed This Visit     None      Visit Diagnoses     Preop examination    -  Primary

## 2017-12-05 ENCOUNTER — TELEPHONE (OUTPATIENT)
Dept: INTERNAL MEDICINE | Facility: CLINIC | Age: 72
End: 2017-12-05

## 2017-12-05 NOTE — TELEPHONE ENCOUNTER
----- Message from Rosaura Blair MA sent at 12/5/2017  3:23 PM EST -----  Pt calling and needs to know if she can continue and have knee surgery.  Pt says she is worried about abn EKG    Pls call # [phone]

## 2017-12-05 NOTE — TELEPHONE ENCOUNTER
Patient has been informed, now stating that Dr Smiley has to fax something to Dr Proctor. I have left message  regarding this at Dr Proctor's office to see what exactly they need from us.   [phone]

## 2017-12-06 NOTE — TELEPHONE ENCOUNTER
Pt called again today and says all is needed is a note [id number] she is cleared for surgery.  Pt is very anxious as surgery is scheduled for Tues 12/12

## 2018-02-11 DIAGNOSIS — F41.9 ANXIETY: ICD-10-CM

## 2018-02-12 RX ORDER — DULOXETIN HYDROCHLORIDE 30 MG/1
CAPSULE, DELAYED RELEASE ORAL
Qty: 30 CAPSULE | Refills: 1 | Status: SHIPPED | OUTPATIENT
Start: 2018-02-12 | End: 2018-07-17

## 2018-03-27 ENCOUNTER — OFFICE VISIT (OUTPATIENT)
Dept: INTERNAL MEDICINE | Facility: CLINIC | Age: 73
End: 2018-03-27

## 2018-03-27 VITALS
OXYGEN SATURATION: 98 % | SYSTOLIC BLOOD PRESSURE: 140 MMHG | DIASTOLIC BLOOD PRESSURE: 80 MMHG | BODY MASS INDEX: 27.49 KG/M2 | HEART RATE: 80 BPM | WEIGHT: 165 LBS | HEIGHT: 65 IN

## 2018-03-27 DIAGNOSIS — M70.22 OLECRANON BURSITIS OF LEFT ELBOW: Primary | ICD-10-CM

## 2018-03-27 DIAGNOSIS — Z23 NEED FOR VACCINATION: ICD-10-CM

## 2018-03-27 PROCEDURE — 90670 PCV13 VACCINE IM: CPT | Performed by: INTERNAL MEDICINE

## 2018-03-27 PROCEDURE — G0009 ADMIN PNEUMOCOCCAL VACCINE: HCPCS | Performed by: INTERNAL MEDICINE

## 2018-03-27 PROCEDURE — 99212 OFFICE O/P EST SF 10 MIN: CPT | Performed by: INTERNAL MEDICINE

## 2018-03-27 NOTE — PROGRESS NOTES
"Chief Complaint   Patient presents with   • Elbow Problem     Knot / Cyst on LT Elbow x 3 weeks       Subjective   Deja Juárez is a 73 y.o. female.     History of Present Illness     She has had swelling of her left elbow for several weeks.  There is discomfort in the area if she extends her arm.  She has not had any fever.  There is no overlying skin change.        The following portions of the patient's history were reviewed and updated as appropriate: allergies, current medications, past family history, past medical history, past social history, past surgical history and problem list.    Review of Systems   Constitutional: Negative for activity change and appetite change.         Current Outpatient Prescriptions:   •  atorvastatin (LIPITOR) 40 MG tablet, Take 1 tablet by mouth Daily., Disp: 90 tablet, Rfl: 1  •  calcium carbonate-vitamin d (CALTRATE 600+D) 600-400 MG-UNIT per tablet, Take 1 tablet by mouth., Disp: , Rfl:   •  Cholecalciferol (VITAMIN D) 1000 UNITS tablet, Take 1,000 Units by mouth., Disp: , Rfl:   •  DULoxetine (CYMBALTA) 30 MG capsule, TAKE ONE CAPSULE BY MOUTH DAILY, Disp: 30 capsule, Rfl: 1  •  famotidine (PEPCID) 20 MG tablet, Take 20 mg by mouth Daily., Disp: , Rfl:   •  Multiple Vitamin (MULTIVITAMIN) tablet, Take 1 tablet by mouth., Disp: , Rfl:         Objective     Vitals:    03/27/18 1106   BP: 140/80   Pulse: 80   SpO2: 98%   Weight: 74.8 kg (165 lb)   Height: 165.1 cm (65\")       Physical Exam   Constitutional: She appears well-developed and well-nourished. No distress.   Musculoskeletal: Normal range of motion.   There is swelling about her left elbow posteriorly.  No tenderness to palpation.  No erythema.   Nursing note and vitals reviewed.        Assessment/Plan   Deja was seen today for elbow problem.    Diagnoses and all orders for this visit:    Olecranon bursitis of left elbow  -     Ambulatory Referral to Orthopedic Surgery    Need for vaccination  -     Pneumococcal " Conjugate Vaccine 13-Valent All (PCV13)    Discussed her elbow swelling.  Advised her it was most likely olecranon bursitis.  She will see her orthopedist for further treatment.

## 2018-04-17 DIAGNOSIS — Z12.31 ENCOUNTER FOR SCREENING MAMMOGRAM FOR BREAST CANCER: Primary | ICD-10-CM

## 2018-04-18 ENCOUNTER — OFFICE VISIT (OUTPATIENT)
Dept: INTERNAL MEDICINE | Facility: CLINIC | Age: 73
End: 2018-04-18

## 2018-04-18 VITALS
BODY MASS INDEX: 27.66 KG/M2 | SYSTOLIC BLOOD PRESSURE: 134 MMHG | WEIGHT: 166 LBS | HEIGHT: 65 IN | DIASTOLIC BLOOD PRESSURE: 78 MMHG

## 2018-04-18 DIAGNOSIS — F41.9 ANXIETY: ICD-10-CM

## 2018-04-18 DIAGNOSIS — E78.5 HYPERLIPIDEMIA, UNSPECIFIED HYPERLIPIDEMIA TYPE: Primary | ICD-10-CM

## 2018-04-18 LAB
ALBUMIN SERPL-MCNC: 4.4 G/DL (ref 3.5–5.2)
ALBUMIN/GLOB SERPL: 1.7 G/DL
ALP SERPL-CCNC: 96 U/L (ref 39–117)
ALT SERPL-CCNC: 19 U/L (ref 1–33)
AST SERPL-CCNC: 18 U/L (ref 1–32)
BILIRUB SERPL-MCNC: 1.1 MG/DL (ref 0.1–1.2)
BUN SERPL-MCNC: 13 MG/DL (ref 8–23)
BUN/CREAT SERPL: 17.6 (ref 7–25)
CALCIUM SERPL-MCNC: 9.3 MG/DL (ref 8.6–10.5)
CHLORIDE SERPL-SCNC: 98 MMOL/L (ref 98–107)
CHOLEST SERPL-MCNC: 190 MG/DL (ref 0–200)
CO2 SERPL-SCNC: 26.2 MMOL/L (ref 22–29)
CREAT SERPL-MCNC: 0.74 MG/DL (ref 0.57–1)
GLOBULIN SER CALC-MCNC: 2.6 GM/DL
GLUCOSE SERPL-MCNC: 93 MG/DL (ref 65–99)
HDLC SERPL-MCNC: 53 MG/DL (ref 40–60)
LDLC SERPL CALC-MCNC: 106 MG/DL (ref 0–100)
POTASSIUM SERPL-SCNC: 4.7 MMOL/L (ref 3.5–5.2)
PROT SERPL-MCNC: 7 G/DL (ref 6–8.5)
SODIUM SERPL-SCNC: 138 MMOL/L (ref 136–145)
TRIGL SERPL-MCNC: 153 MG/DL (ref 0–150)
VLDLC SERPL-MCNC: 30.6 MG/DL (ref 5–40)

## 2018-04-18 PROCEDURE — 99213 OFFICE O/P EST LOW 20 MIN: CPT | Performed by: INTERNAL MEDICINE

## 2018-04-18 NOTE — PROGRESS NOTES
Chief Complaint   Patient presents with   • Hyperlipidemia     6 month follow up   • Anxiety       Subjective   Deja Juárez is a 73 y.o. female.     Hyperlipidemia   This is a chronic problem. The problem is controlled. Recent lipid tests were reviewed and are variable. She has no history of diabetes or hypothyroidism. There are no known factors aggravating her hyperlipidemia. Pertinent negatives include no chest pain, leg pain, myalgias or shortness of breath. Current antihyperlipidemic treatment includes statins, exercise and diet change. The current treatment provides moderate improvement of lipids. There are no compliance problems.    Anxiety   Presents for follow-up visit. Symptoms include nervous/anxious behavior. Patient reports no chest pain, depressed mood, palpitations or shortness of breath. Symptoms occur most days. The severity of symptoms is moderate.     Compliance with medications is % (She currently takes Cymbalta.  She finds that this helps her control anxiety).        The following portions of the patient's history were reviewed and updated as appropriate: allergies, current medications, past family history, past medical history, past social history, past surgical history and problem list.    Review of Systems   Constitutional: Negative for appetite change.   HENT: Negative for nosebleeds.    Eyes: Negative for blurred vision and double vision.   Respiratory: Negative for cough and shortness of breath.    Cardiovascular: Negative for chest pain, palpitations and leg swelling.   Musculoskeletal: Negative for myalgias.   Neurological: Negative for headaches.   Psychiatric/Behavioral: Negative for depressed mood. The patient is nervous/anxious.          Current Outpatient Prescriptions:   •  atorvastatin (LIPITOR) 40 MG tablet, Take 1 tablet by mouth Daily., Disp: 90 tablet, Rfl: 1  •  calcium carbonate-vitamin d (CALTRATE 600+D) 600-400 MG-UNIT per tablet, Take 1 tablet by mouth., Disp: ,  "Rfl:   •  Cholecalciferol (VITAMIN D) 1000 UNITS tablet, Take 1,000 Units by mouth., Disp: , Rfl:   •  DULoxetine (CYMBALTA) 30 MG capsule, TAKE ONE CAPSULE BY MOUTH DAILY, Disp: 30 capsule, Rfl: 1  •  famotidine (PEPCID) 20 MG tablet, Take 20 mg by mouth Daily., Disp: , Rfl:   •  Multiple Vitamin (MULTIVITAMIN) tablet, Take 1 tablet by mouth., Disp: , Rfl:         Objective     /78   Ht 165.1 cm (65\")   Wt 75.3 kg (166 lb)   BMI 27.62 kg/m²     Physical Exam   Constitutional: She is oriented to person, place, and time. She appears well-developed and well-nourished. No distress.   Neck: Normal carotid pulses present. Carotid bruit is not present.   Cardiovascular: Regular rhythm, S1 normal and S2 normal.  Exam reveals no gallop and no friction rub.    No murmur heard.  Pulses:       Carotid pulses are 2+ on the right side, and 2+ on the left side.  Pulmonary/Chest: Effort normal and breath sounds normal. She has no wheezes. She has no rhonchi. She has no rales. Chest wall is not dull to percussion.   Musculoskeletal: She exhibits no edema.   Neurological: She is alert and oriented to person, place, and time.   Skin: Skin is warm and dry.   Nursing note and vitals reviewed.        Assessment/Plan   Deja was seen today for hyperlipidemia and anxiety.    Diagnoses and all orders for this visit:    Hyperlipidemia, unspecified hyperlipidemia type  -     Comprehensive Metabolic Panel; Future  -     Lipid Panel; Future  -     Comprehensive Metabolic Panel  -     Lipid Panel    Anxiety               "

## 2018-06-04 RX ORDER — ATORVASTATIN CALCIUM 40 MG/1
TABLET, FILM COATED ORAL
Qty: 90 TABLET | Refills: 0 | Status: SHIPPED | OUTPATIENT
Start: 2018-06-04 | End: 2019-10-03

## 2018-07-17 ENCOUNTER — OFFICE VISIT (OUTPATIENT)
Dept: INTERNAL MEDICINE | Facility: CLINIC | Age: 73
End: 2018-07-17

## 2018-07-17 ENCOUNTER — APPOINTMENT (OUTPATIENT)
Dept: WOMENS IMAGING | Facility: HOSPITAL | Age: 73
End: 2018-07-17

## 2018-07-17 VITALS
TEMPERATURE: 97.8 F | OXYGEN SATURATION: 97 % | SYSTOLIC BLOOD PRESSURE: 132 MMHG | DIASTOLIC BLOOD PRESSURE: 84 MMHG | HEART RATE: 71 BPM | BODY MASS INDEX: 27.62 KG/M2 | WEIGHT: 166 LBS

## 2018-07-17 DIAGNOSIS — M54.2 CERVICALGIA: Primary | ICD-10-CM

## 2018-07-17 DIAGNOSIS — R07.81 RIB PAIN ON LEFT SIDE: ICD-10-CM

## 2018-07-17 DIAGNOSIS — H53.8 BLURRED VISION, LEFT EYE: ICD-10-CM

## 2018-07-17 PROCEDURE — 99214 OFFICE O/P EST MOD 30 MIN: CPT | Performed by: NURSE PRACTITIONER

## 2018-07-17 PROCEDURE — 72050 X-RAY EXAM NECK SPINE 4/5VWS: CPT | Performed by: NURSE PRACTITIONER

## 2018-07-17 PROCEDURE — 71100 X-RAY EXAM RIBS UNI 2 VIEWS: CPT | Performed by: NURSE PRACTITIONER

## 2018-07-17 PROCEDURE — 71046 X-RAY EXAM CHEST 2 VIEWS: CPT | Performed by: RADIOLOGY

## 2018-07-17 PROCEDURE — 72052 X-RAY EXAM NECK SPINE 6/>VWS: CPT | Performed by: RADIOLOGY

## 2018-07-17 PROCEDURE — 71046 X-RAY EXAM CHEST 2 VIEWS: CPT | Performed by: NURSE PRACTITIONER

## 2018-07-17 PROCEDURE — 71100 X-RAY EXAM RIBS UNI 2 VIEWS: CPT | Performed by: RADIOLOGY

## 2018-07-17 NOTE — PATIENT INSTRUCTIONS
Muscle Pain, Adult  Muscle pain (myalgia) may be mild or severe. In most cases, the pain lasts only a short time and it goes away without treatment. It is normal to feel some muscle pain after starting a workout program. Muscles that have not been used often will be sore at first.  Muscle pain may also be caused by many other things, including:  · Overuse or muscle strain, especially if you are not in shape. This is the most common cause of muscle pain.  · Injury.  · Bruises.  · Viruses, such as the flu.  · Infectious diseases.  · A chronic condition that causes muscle tenderness, fatigue, and headache (fibromyalgia).  · A condition, such as lupus, in which the body’s disease-fighting system attacks other organs in the body (autoimmune or rheumatologic diseases).  · Certain drugs, including ACE inhibitors and statins.    To diagnose the cause of your muscle pain, your health care provider will do a physical exam and ask questions about the pain and when it began. If you have not had muscle pain for very long, your health care provider may want to wait before doing much testing. If your muscle pain has lasted a long time, your health care provider may want to run tests right away. In some cases, this may include tests to rule out certain conditions or illnesses.  Treatment for muscle pain depends on the cause. Home care is often enough to relieve muscle pain. Your health care provider may also prescribe anti-inflammatory medicine.  Follow these instructions at home:  Activity  · If overuse is causing your muscle pain:  ? Slow down your activities until the pain goes away.  ? Do regular, gentle exercises if you are not usually active.  ? Warm up before exercising. Stretch before and after exercising. This can help lower the risk of muscle pain.  · Do not continue working out if the pain is very bad. Bad pain could mean that you have injured a muscle.  Managing pain and discomfort    · If directed, apply ice to the  sore muscle:  ? Put ice in a plastic bag.  ? Place a towel between your skin and the bag.  ? Leave the ice on for 20 minutes, 2-3 times a day.  · You may also alternate between applying ice and applying heat as told by your health care provider. To apply heat, use the heat source that your health care provider recommends, such as a moist heat pack or a heating pad.  ? Place a towel between your skin and the heat source.  ? Leave the heat on for 20-30 minutes.  ? Remove the heat if your skin turns bright red. This is especially important if you are unable to feel pain, heat, or cold. You may have a greater risk of getting burned.  Medicines  · Take over-the-counter and prescription medicines only as told by your health care provider.  · Do not drive or use heavy machinery while taking prescription pain medicine.  Contact a health care provider if:  · Your muscle pain gets worse and medicines do not help.  · You have muscle pain that lasts longer than 3 days.  · You have a rash or fever along with muscle pain.  · You have muscle pain after a tick bite.  · You have muscle pain while working out, even though you are in good physical condition.  · You have redness, soreness, or swelling along with muscle pain.  · You have muscle pain after starting a new medicine or changing the dose of a medicine.  Get help right away if:  · You have trouble breathing.  · You have trouble swallowing.  · You have muscle pain along with a stiff neck, fever, and vomiting.  · You have severe muscle weakness or cannot move part of your body.  This information is not intended to replace advice given to you by your health care provider. Make sure you discuss any questions you have with your health care provider.  Document Released: 11/09/2007 Document Revised: 07/07/2017 Document Reviewed: 05/09/2017  [employer] Interactive Patient Education © 2018 [employer]

## 2018-07-17 NOTE — PROGRESS NOTES
Subjective   Deja Juárez is a 73 y.o. female.     Her last eye exam about 6 months ago. She does golf routinely but hasn't lately secondary to fatigue.       Neck Pain    This is a new problem. The current episode started 1 to 4 weeks ago. The problem occurs intermittently. The problem has been waxing and waning. The pain is associated with nothing. The pain is present in the left side. Quality: squeezing pain  The pain is at a severity of 0/10 (worst 7/10 ). The patient is experiencing no pain. Associated symptoms include a visual change (left eye blurry ). Pertinent negatives include no chest pain, fever, headaches, numbness, pain with swallowing, paresis, photophobia, tingling or trouble swallowing. Associated symptoms comments: Reflux (chronic )  Chronic sinus .        The following portions of the patient's history were reviewed and updated as appropriate: allergies, current medications, past social history and problem list.    Review of Systems   Constitutional: Negative for activity change, appetite change, fatigue and fever.   HENT: Positive for sinus pressure (chronic ). Negative for trouble swallowing.    Eyes: Negative for photophobia.   Respiratory: Negative for cough, shortness of breath and wheezing.    Cardiovascular: Negative for chest pain, palpitations and leg swelling.   Musculoskeletal: Positive for arthralgias (bilateral knees, right shoulder, chronic ) and neck pain. Negative for neck stiffness.   Allergic/Immunologic: Positive for environmental allergies.   Neurological: Negative for tingling, numbness and headaches.       Objective   Physical Exam   Constitutional: She is oriented to person, place, and time. She appears well-developed and well-nourished.   HENT:   Head: Normocephalic.   Nose: Nose normal.   Neck: Trachea normal and full passive range of motion without pain. Muscular tenderness present. Carotid bruit is not present. Normal range of motion present. No thyroid mass and no  thyromegaly present.       Cardiovascular: Regular rhythm and normal heart sounds.  Exam reveals no S3 and no S4.    No murmur heard.  Pulmonary/Chest: Effort normal and breath sounds normal. She has no decreased breath sounds. She has no wheezes. She has no rhonchi. She has no rales.   Abdominal: Normal appearance and bowel sounds are normal. There is tenderness.       Musculoskeletal: She exhibits no edema.        Cervical back: She exhibits tenderness and spasm. She exhibits normal range of motion, no pain and normal pulse.   Equal  strength    Neurological: She is alert and oriented to person, place, and time. Gait normal.   Skin: Skin is warm and dry.   Psychiatric: She has a normal mood and affect.       Assessment/Plan   Deja was seen today for neck pain.    Diagnoses and all orders for this visit:    Cervicalgia  Comments:  heat, stretches *handout provided, massage  Orders:  -     XR Spine Cervical Complete 4 or 5 View    Rib pain on left side  -     XR Chest 2 View  -     XR Ribs 2 View Left    Blurred vision, left eye  Comments:  needs to see eye doctor asap    CXR with stable chronic findings.   C-spine with degenerative changes noted. Sent for final review.   I did discuss with Dr Rich.

## 2018-08-16 ENCOUNTER — OFFICE VISIT (OUTPATIENT)
Dept: INTERNAL MEDICINE | Facility: CLINIC | Age: 73
End: 2018-08-16

## 2018-08-16 VITALS
HEART RATE: 88 BPM | OXYGEN SATURATION: 99 % | SYSTOLIC BLOOD PRESSURE: 122 MMHG | HEIGHT: 65 IN | DIASTOLIC BLOOD PRESSURE: 82 MMHG | WEIGHT: 162 LBS | BODY MASS INDEX: 26.99 KG/M2

## 2018-08-16 DIAGNOSIS — M54.2 CERVICALGIA: Primary | ICD-10-CM

## 2018-08-16 DIAGNOSIS — R07.81 RIB PAIN ON LEFT SIDE: ICD-10-CM

## 2018-08-16 DIAGNOSIS — H26.9 CATARACT OF LEFT EYE, UNSPECIFIED CATARACT TYPE: ICD-10-CM

## 2018-08-16 PROCEDURE — 99213 OFFICE O/P EST LOW 20 MIN: CPT | Performed by: NURSE PRACTITIONER

## 2018-08-16 NOTE — PROGRESS NOTES
Subjective   Deja Juárez is a 73 y.o. female.     She is here for follow up of neck pain. She had imaging performed that revealed mild degenerative changes. She has done better with stretches and heat. She went to opthalamogist and noticed left eye cataract       Neck Pain    This is a new problem. The current episode started 1 to 4 weeks ago. The problem has been resolved. The pain is present in the left side. The quality of the pain is described as aching. The pain is at a severity of 0/10. The patient is experiencing no pain. Associated symptoms include a visual change (has cataract in left eye ). Pertinent negatives include no chest pain, fever, headaches, leg pain, numbness, syncope, tingling, trouble swallowing, weakness or weight loss. Treatments tried: heat and stretches  The treatment provided moderate relief.        The following portions of the patient's history were reviewed and updated as appropriate: allergies, current medications, past social history and problem list.    Review of Systems   Constitutional: Negative for activity change, appetite change, fatigue, fever and weight loss.   HENT: Negative for trouble swallowing.    Eyes: Positive for visual disturbance (left eye with cataract ).   Respiratory: Negative for cough, shortness of breath and wheezing.    Cardiovascular: Negative for chest pain, palpitations, leg swelling and syncope.   Musculoskeletal: Positive for neck pain (resolved ). Negative for neck stiffness.   Neurological: Negative for tingling, weakness, numbness and headaches.       Objective   Physical Exam   Constitutional: She is oriented to person, place, and time. She appears well-developed and well-nourished.   HENT:   Head: Normocephalic.   Nose: Nose normal.   Neck: Full passive range of motion without pain. Carotid bruit is not present. No thyroid mass and no thyromegaly present.   Cardiovascular: Regular rhythm and normal heart sounds.  Exam reveals no S3 and no S4.     No murmur heard.  Pulmonary/Chest: Effort normal and breath sounds normal. She has no decreased breath sounds. She has no wheezes. She has no rhonchi. She has no rales.   Musculoskeletal: She exhibits no edema.        Cervical back: She exhibits normal range of motion and no tenderness.   Neurological: She is alert and oriented to person, place, and time. Gait normal.   Reflex Scores:       Brachioradialis reflexes are 2+ on the right side and 2+ on the left side.  Skin: Skin is warm and dry.   Psychiatric: She has a normal mood and affect.       Assessment/Plan   Deja was seen today for neck pain.    Diagnoses and all orders for this visit:    Cervicalgia  Comments:  resolved     Rib pain on left side  Comments:  resolved     Cataract of left eye, unspecified cataract type  Comments:  due to schedule surgery       I reviewed recent imaging reports with patient .

## 2018-09-19 ENCOUNTER — APPOINTMENT (OUTPATIENT)
Dept: WOMENS IMAGING | Facility: HOSPITAL | Age: 73
End: 2018-09-19

## 2018-09-19 ENCOUNTER — OFFICE VISIT (OUTPATIENT)
Dept: INTERNAL MEDICINE | Facility: CLINIC | Age: 73
End: 2018-09-19

## 2018-09-19 VITALS
WEIGHT: 162 LBS | DIASTOLIC BLOOD PRESSURE: 72 MMHG | BODY MASS INDEX: 26.99 KG/M2 | OXYGEN SATURATION: 97 % | TEMPERATURE: 98.4 F | HEART RATE: 82 BPM | HEIGHT: 65 IN | SYSTOLIC BLOOD PRESSURE: 118 MMHG

## 2018-09-19 DIAGNOSIS — R39.9 URINARY SYMPTOM OR SIGN: ICD-10-CM

## 2018-09-19 DIAGNOSIS — R05.9 COUGH: Primary | ICD-10-CM

## 2018-09-19 LAB
BILIRUB UR QL STRIP: NEGATIVE
CLARITY UR: CLEAR
COLOR UR: YELLOW
GLUCOSE UR STRIP-MCNC: NEGATIVE MG/DL
HGB UR QL STRIP.AUTO: NEGATIVE
KETONES UR QL STRIP: NEGATIVE
LEUKOCYTE ESTERASE UR QL STRIP.AUTO: NEGATIVE
NITRITE UR QL STRIP: NEGATIVE
PH UR STRIP.AUTO: 6 [PH] (ref 5–8)
PROT UR QL STRIP: NEGATIVE
SP GR UR STRIP: 1.01 (ref 1–1.03)
UROBILINOGEN UR QL STRIP: NORMAL

## 2018-09-19 PROCEDURE — 81003 URINALYSIS AUTO W/O SCOPE: CPT | Performed by: INTERNAL MEDICINE

## 2018-09-19 PROCEDURE — 99214 OFFICE O/P EST MOD 30 MIN: CPT | Performed by: INTERNAL MEDICINE

## 2018-09-19 PROCEDURE — 71046 X-RAY EXAM CHEST 2 VIEWS: CPT | Performed by: INTERNAL MEDICINE

## 2018-09-19 PROCEDURE — 71046 X-RAY EXAM CHEST 2 VIEWS: CPT | Performed by: RADIOLOGY

## 2018-09-19 RX ORDER — AZITHROMYCIN 250 MG/1
TABLET, FILM COATED ORAL
Qty: 6 TABLET | Refills: 0 | Status: SHIPPED | OUTPATIENT
Start: 2018-09-19 | End: 2019-01-10

## 2018-09-19 NOTE — PROGRESS NOTES
Chief Complaint   Patient presents with   • Cough       Subjective   Deja Juárez is a 73 y.o. female.     Cough   This is a new problem. The current episode started in the past 7 days (Began gradually about 5 days ago with chills.). The problem has been gradually worsening. The problem occurs constantly (daily). The cough is productive of brown sputum. Associated symptoms include chills, a sore throat and sweats. Pertinent negatives include no fever, hemoptysis, postnasal drip, rhinorrhea, shortness of breath or wheezing. Treatments tried: Mucinex. The treatment provided mild relief. There is no history of asthma, bronchiectasis, bronchitis, COPD or emphysema.      Urinary symptoms: This is a new problem.  Her symptoms have been going on for a couple of days.  They have been constant.  Not worsening.  She has urinary frequency and her urine smells different.  No dysuria.  No hematuria.  She has noticed some discomfort in her back as well.      The following portions of the patient's history were reviewed and updated as appropriate: allergies, current medications, past family history, past medical history, past social history, past surgical history and problem list.    Review of Systems   Constitutional: Positive for chills. Negative for appetite change and fever.   HENT: Positive for sore throat. Negative for dental problem, postnasal drip and rhinorrhea.         Sinus congestion   Respiratory: Positive for cough. Negative for hemoptysis, shortness of breath and wheezing.    Gastrointestinal: Negative for diarrhea, nausea and vomiting.   Musculoskeletal: Positive for back pain.   Neurological: Positive for headache.   Hematological: Negative for adenopathy.         Current Outpatient Prescriptions:   •  atorvastatin (LIPITOR) 40 MG tablet, TAKE ONE TABLET BY MOUTH DAILY, Disp: 90 tablet, Rfl: 0  •  calcium carbonate-vitamin d (CALTRATE 600+D) 600-400 MG-UNIT per tablet, Take 1 tablet by mouth., Disp: , Rfl:   •   "Cholecalciferol (VITAMIN D) 1000 UNITS tablet, Take 1,000 Units by mouth., Disp: , Rfl:   •  famotidine (PEPCID) 20 MG tablet, Take 20 mg by mouth Daily., Disp: , Rfl:   •  Multiple Vitamin (MULTIVITAMIN) tablet, Take 1 tablet by mouth Daily., Disp: , Rfl:   •  azithromycin (ZITHROMAX Z-ELVIRA) 250 MG tablet, Take 2 tablets the first day, then 1 tablet daily for 4 days., Disp: 6 tablet, Rfl: 0        Objective     /72 (BP Location: Left arm, Patient Position: Sitting, Cuff Size: Adult)   Pulse 82   Temp 98.4 °F (36.9 °C) (Oral)   Ht 165.1 cm (65\")   Wt 73.5 kg (162 lb)   SpO2 97%   BMI 26.96 kg/m²     Physical Exam   Constitutional: She appears well-developed and well-nourished.   HENT:   Right Ear: Tympanic membrane and ear canal normal.   Left Ear: Tympanic membrane and ear canal normal.   Nose: Right sinus exhibits no maxillary sinus tenderness and no frontal sinus tenderness. Left sinus exhibits no maxillary sinus tenderness and no frontal sinus tenderness.   Mouth/Throat: Oropharynx is clear and moist.   Cardiovascular: Normal rate, regular rhythm and normal heart sounds.  Exam reveals no gallop and no friction rub.    No murmur heard.  Pulmonary/Chest: Effort normal. No respiratory distress. She has no wheezes. She has rhonchi (scattered). She has no rales.   Abdominal: Soft. There is no CVA tenderness.   Musculoskeletal: She exhibits deformity (scoliosis).   Lymphadenopathy:     She has no cervical adenopathy.   Nursing note and vitals reviewed.        Assessment/Plan   Deja was seen today for cough.    Diagnoses and all orders for this visit:    Cough  -     XR Chest PA & Lateral    Urinary symptom or sign  -     Urinalysis With Microscopic If Indicated (No Culture) - Urine, Clean Catch; Future  -     Urinalysis With Microscopic If Indicated (No Culture) - Urine, Clean Catch    Other orders  -     azithromycin (ZITHROMAX Z-ELVIRA) 250 MG tablet; Take 2 tablets the first day, then 1 tablet daily for 4 " days.      Her urinalysis is clear.  Lung fields appear clear on chest x-ray.  This will be sent out for over read.  Discussed.  She likely has acute bronchitis.  In addition to taking azithromycin, encouraged her to keep up with fluids, she may use over-the-counter cough syrup.

## 2018-10-26 ENCOUNTER — OFFICE VISIT (OUTPATIENT)
Dept: INTERNAL MEDICINE | Facility: CLINIC | Age: 73
End: 2018-10-26

## 2018-10-26 ENCOUNTER — APPOINTMENT (OUTPATIENT)
Dept: WOMENS IMAGING | Facility: HOSPITAL | Age: 73
End: 2018-10-26

## 2018-10-26 VITALS
SYSTOLIC BLOOD PRESSURE: 122 MMHG | DIASTOLIC BLOOD PRESSURE: 66 MMHG | BODY MASS INDEX: 26.99 KG/M2 | HEIGHT: 65 IN | WEIGHT: 162 LBS

## 2018-10-26 DIAGNOSIS — E78.5 HYPERLIPIDEMIA, UNSPECIFIED HYPERLIPIDEMIA TYPE: Primary | ICD-10-CM

## 2018-10-26 DIAGNOSIS — Z12.31 ENCOUNTER FOR SCREENING MAMMOGRAM FOR BREAST CANCER: ICD-10-CM

## 2018-10-26 DIAGNOSIS — K21.9 GASTROESOPHAGEAL REFLUX DISEASE, ESOPHAGITIS PRESENCE NOT SPECIFIED: ICD-10-CM

## 2018-10-26 LAB
ALBUMIN SERPL-MCNC: 4.6 G/DL (ref 3.5–5.2)
ALBUMIN/GLOB SERPL: 1.5 G/DL
ALP SERPL-CCNC: 116 U/L (ref 39–117)
ALT SERPL W P-5'-P-CCNC: 21 U/L (ref 1–33)
ANION GAP SERPL CALCULATED.3IONS-SCNC: 11.7 MMOL/L
AST SERPL-CCNC: 22 U/L (ref 1–32)
BILIRUB SERPL-MCNC: 1 MG/DL (ref 0.1–1.2)
BUN BLD-MCNC: 13 MG/DL (ref 8–23)
BUN/CREAT SERPL: 17.8 (ref 7–25)
CALCIUM SPEC-SCNC: 9.9 MG/DL (ref 8.6–10.5)
CHLORIDE SERPL-SCNC: 101 MMOL/L (ref 98–107)
CHOLEST SERPL-MCNC: 200 MG/DL (ref 0–200)
CO2 SERPL-SCNC: 26.3 MMOL/L (ref 22–29)
CREAT BLD-MCNC: 0.73 MG/DL (ref 0.57–1)
DEPRECATED RDW RBC AUTO: 40.8 FL (ref 37–54)
ERYTHROCYTE [DISTWIDTH] IN BLOOD BY AUTOMATED COUNT: 12.4 % (ref 11.5–15)
GFR SERPL CREATININE-BSD FRML MDRD: 78 ML/MIN/1.73
GLOBULIN UR ELPH-MCNC: 3.1 GM/DL
GLUCOSE BLD-MCNC: 89 MG/DL (ref 65–99)
HCT VFR BLD AUTO: 43.4 % (ref 34.1–44.9)
HDLC SERPL-MCNC: 57 MG/DL (ref 40–60)
HGB BLD-MCNC: 14 G/DL (ref 11.2–15.7)
LDLC SERPL CALC-MCNC: 108 MG/DL (ref 0–100)
LDLC/HDLC SERPL: 1.89 {RATIO}
MCH RBC QN AUTO: 29.1 PG (ref 26–34)
MCHC RBC AUTO-ENTMCNC: 32.3 G/DL (ref 31–37)
MCV RBC AUTO: 90.2 FL (ref 80–100)
PLATELET # BLD AUTO: 341 10*3/MM3 (ref 150–450)
PMV BLD AUTO: 9.6 FL (ref 6–12)
POTASSIUM BLD-SCNC: 5.4 MMOL/L (ref 3.5–5.2)
PROT SERPL-MCNC: 7.7 G/DL (ref 6–8.5)
RBC # BLD AUTO: 4.81 10*6/MM3 (ref 3.93–5.22)
SODIUM BLD-SCNC: 139 MMOL/L (ref 136–145)
TRIGL SERPL-MCNC: 176 MG/DL (ref 0–150)
VLDLC SERPL-MCNC: 35.2 MG/DL (ref 5–40)
WBC NRBC COR # BLD: 6.79 10*3/MM3 (ref 5–10)

## 2018-10-26 PROCEDURE — 85027 COMPLETE CBC AUTOMATED: CPT | Performed by: INTERNAL MEDICINE

## 2018-10-26 PROCEDURE — 99213 OFFICE O/P EST LOW 20 MIN: CPT | Performed by: INTERNAL MEDICINE

## 2018-10-26 PROCEDURE — 90662 IIV NO PRSV INCREASED AG IM: CPT | Performed by: INTERNAL MEDICINE

## 2018-10-26 PROCEDURE — G0008 ADMIN INFLUENZA VIRUS VAC: HCPCS | Performed by: INTERNAL MEDICINE

## 2018-10-26 PROCEDURE — 80061 LIPID PANEL: CPT | Performed by: INTERNAL MEDICINE

## 2018-10-26 PROCEDURE — 80053 COMPREHEN METABOLIC PANEL: CPT | Performed by: INTERNAL MEDICINE

## 2018-10-26 PROCEDURE — 77067 SCR MAMMO BI INCL CAD: CPT | Performed by: INTERNAL MEDICINE

## 2018-10-26 PROCEDURE — 77067 SCR MAMMO BI INCL CAD: CPT | Performed by: RADIOLOGY

## 2018-10-26 PROCEDURE — 36415 COLL VENOUS BLD VENIPUNCTURE: CPT | Performed by: INTERNAL MEDICINE

## 2018-10-26 NOTE — PROGRESS NOTES
Chief Complaint   Patient presents with   • Hyperlipidemia     6 month follow up       Subjective   Deja Juárez is a 73 y.o. female.     History of Present Illness     Hyperlipidemia:     Her most recent lipid panel was done on 4/18/2018.    Total cholesterol was 190, Triglycerides 153, HDL 53, .   Current treatment: Statin medication  Prudent diet and regular exercise have also been recommended.  No management changes were made at her last appointment.   By report, there is good compliance with treatment, good tolerance of treatment.  She has not experienced statin associated myalgias, dyspepsia.  She has not had liver enzyme elevation.  Medical history negative for hypertension, negative for coronary artery disease.    GERD: She sometimes has symptoms of heartburn.  She takes an H2 blocker.  This provides good symptom relief.         The following portions of the patient's history were reviewed and updated as appropriate: allergies, current medications, past family history, past medical history, past social history, past surgical history and problem list.    Review of Systems   Constitutional: Negative for appetite change.   Eyes: Positive for visual disturbance (cataract left eye).   Respiratory: Negative for cough and shortness of breath.    Cardiovascular: Negative for chest pain, palpitations and leg swelling.   Gastrointestinal: Positive for indigestion.   Neurological: Negative for headache.         Current Outpatient Prescriptions:   •  atorvastatin (LIPITOR) 40 MG tablet, TAKE ONE TABLET BY MOUTH DAILY, Disp: 90 tablet, Rfl: 0  •  azithromycin (ZITHROMAX Z-ELVIRA) 250 MG tablet, Take 2 tablets the first day, then 1 tablet daily for 4 days., Disp: 6 tablet, Rfl: 0  •  calcium carbonate-vitamin d (CALTRATE 600+D) 600-400 MG-UNIT per tablet, Take 1 tablet by mouth., Disp: , Rfl:   •  Cholecalciferol (VITAMIN D) 1000 UNITS tablet, Take 1,000 Units by mouth., Disp: , Rfl:   •  famotidine (PEPCID) 20 MG  "tablet, Take 20 mg by mouth Daily., Disp: , Rfl:   •  Multiple Vitamin (MULTIVITAMIN) tablet, Take 1 tablet by mouth Daily., Disp: , Rfl:         Objective     /66   Ht 165.1 cm (65\")   Wt 73.5 kg (162 lb)   BMI 26.96 kg/m²     Physical Exam   Constitutional: She is oriented to person, place, and time. She appears well-developed and well-nourished. No distress.   Neck: Normal carotid pulses present. Carotid bruit is not present.   Cardiovascular: Regular rhythm, S1 normal and S2 normal.  Exam reveals no gallop and no friction rub.    No murmur heard.  Pulses:       Carotid pulses are 2+ on the right side, and 2+ on the left side.  Pulmonary/Chest: Effort normal and breath sounds normal. She has no wheezes. She has no rhonchi. She has no rales. Chest wall is not dull to percussion.   Musculoskeletal: She exhibits no edema.   Neurological: She is alert and oriented to person, place, and time.   Skin: Skin is warm and dry.   Nursing note and vitals reviewed.        Assessment/Plan   Deja was seen today for hyperlipidemia.    Diagnoses and all orders for this visit:    Hyperlipidemia, unspecified hyperlipidemia type  -     Comprehensive Metabolic Panel; Future  -     Lipid Panel; Future  -     CBC (No Diff); Future  -     Comprehensive Metabolic Panel  -     Lipid Panel  -     CBC (No Diff)    Gastroesophageal reflux disease, esophagitis presence not specified    Other orders  -     Fluzone High Dose =>65Years      She will continue her current medications.  Continue famotidine for relief of heartburn.  Encouraged prudent diet, regular exercise.         "

## 2019-01-02 ENCOUNTER — OFFICE VISIT (OUTPATIENT)
Dept: INTERNAL MEDICINE | Facility: CLINIC | Age: 74
End: 2019-01-02

## 2019-01-02 VITALS
HEIGHT: 65 IN | OXYGEN SATURATION: 97 % | WEIGHT: 166 LBS | DIASTOLIC BLOOD PRESSURE: 84 MMHG | BODY MASS INDEX: 27.66 KG/M2 | SYSTOLIC BLOOD PRESSURE: 132 MMHG | HEART RATE: 87 BPM

## 2019-01-02 DIAGNOSIS — R10.9 LEFT FLANK PAIN: Primary | ICD-10-CM

## 2019-01-02 DIAGNOSIS — R21 RASH: ICD-10-CM

## 2019-01-02 PROCEDURE — 99214 OFFICE O/P EST MOD 30 MIN: CPT | Performed by: INTERNAL MEDICINE

## 2019-01-02 PROCEDURE — 93000 ELECTROCARDIOGRAM COMPLETE: CPT | Performed by: INTERNAL MEDICINE

## 2019-01-02 PROCEDURE — 81003 URINALYSIS AUTO W/O SCOPE: CPT | Performed by: INTERNAL MEDICINE

## 2019-01-02 RX ORDER — VALACYCLOVIR HYDROCHLORIDE 1 G/1
1000 TABLET, FILM COATED ORAL 3 TIMES DAILY
Qty: 21 TABLET | Refills: 0 | Status: SHIPPED | OUTPATIENT
Start: 2019-01-02 | End: 2019-01-10

## 2019-01-02 NOTE — PROGRESS NOTES
Chief Complaint   Patient presents with   • Chest Pain     pain in chest radiating around left side, sharp at times, causing SOB       Subjective   Deja Juárez is a 73 y.o. female.     Chest Pain    This is a new problem. The current episode started in the past 7 days. The onset quality is sudden. The problem occurs daily. The problem has been unchanged. The pain is present in the lateral region. The pain is moderate. The quality of the pain is described as dull (achy). Associated symptoms include abdominal pain (LUQ) and palpitations. Pertinent negatives include no cough, diaphoresis, dizziness, exertional chest pressure, fever, lower extremity edema, malaise/fatigue, nausea, orthopnea, PND, shortness of breath or vomiting. Associated symptoms comments: + rash on her left back. She has tried acetaminophen and NSAIDs for the symptoms. The treatment provided mild relief. Prior workup: stress test with myocardial perfusion 2/2017.      She woke up Saturday morning with pain in her left upper abdomen that radiated to her left flank.  It has not gone away.  It has been persistent.  It is an achy pain.  No specific aggravating factors.  She has tried taking Tylenol and nonsteroidal anti-inflammatory agent.  These have helped a little bit.  She has not had nausea, vomiting, dysuria, frequency constipation or diarrhea.  No upper chest pain.  She has recently noted a rash on her left back.  The rash itches.    The following portions of the patient's history were reviewed and updated as appropriate: allergies, current medications, past family history, past medical history, past social history, past surgical history and problem list.    Review of Systems   Constitutional: Negative for appetite change, diaphoresis, fever and malaise/fatigue.   Respiratory: Negative for cough and shortness of breath.    Cardiovascular: Positive for chest pain and palpitations. Negative for orthopnea, leg swelling and PND.  "  Gastrointestinal: Positive for abdominal pain (LUQ). Negative for nausea and vomiting.   Neurological: Negative for dizziness.         Current Outpatient Medications:   •  atorvastatin (LIPITOR) 40 MG tablet, TAKE ONE TABLET BY MOUTH DAILY, Disp: 90 tablet, Rfl: 0  •  azithromycin (ZITHROMAX Z-ELVIRA) 250 MG tablet, Take 2 tablets the first day, then 1 tablet daily for 4 days., Disp: 6 tablet, Rfl: 0  •  calcium carbonate-vitamin d (CALTRATE 600+D) 600-400 MG-UNIT per tablet, Take 1 tablet by mouth., Disp: , Rfl:   •  Cholecalciferol (VITAMIN D) 1000 UNITS tablet, Take 1,000 Units by mouth., Disp: , Rfl:   •  famotidine (PEPCID) 20 MG tablet, Take 20 mg by mouth Daily., Disp: , Rfl:   •  Multiple Vitamin (MULTIVITAMIN) tablet, Take 1 tablet by mouth Daily., Disp: , Rfl:   •  valACYclovir (VALTREX) 1000 MG tablet, Take 1 tablet by mouth 3 (Three) Times a Day for 7 days., Disp: 21 tablet, Rfl: 0        Objective     /84   Pulse 87   Ht 165.1 cm (65\")   Wt 75.3 kg (166 lb)   SpO2 97%   BMI 27.62 kg/m²       Physical Exam   Constitutional: She is oriented to person, place, and time. She appears well-developed and well-nourished. No distress.   Neck: Normal carotid pulses present. Carotid bruit is not present.   Cardiovascular: Normal rate, regular rhythm, S1 normal and S2 normal. Exam reveals no gallop and no friction rub.   No murmur heard.  Pulses:       Carotid pulses are 2+ on the right side, and 2+ on the left side.  Pulmonary/Chest: Effort normal and breath sounds normal. No respiratory distress. She has no wheezes. She has no rhonchi. She has no rales. Chest wall is not dull to percussion.   Abdominal: Soft. She exhibits no distension. There is no hepatosplenomegaly. There is no tenderness. There is no rebound, no guarding and no CVA tenderness.   Musculoskeletal: She exhibits no edema.   Neurological: She is alert and oriented to person, place, and time.   Skin: Skin is warm and dry. Rash noted. "   Erythematous rash present left lower thoracic area.  There appear to be some early blisters.   Nursing note and vitals reviewed.      ECG 12 Lead  Date/Time: 1/2/2019 10:52 AM  Performed by: Smiley Matos  Authorized by: Vicky Smiley MD   Comparison: compared with previous ECG from 2/8/2017  Similar to previous ECG  Rhythm: sinus rhythm  Ectopy: PVCs  Rate: normal  BPM: 71  Conduction: conduction normal  ST Segments: ST segments normal  QRS axis: normal  Clinical impression: non-specific ECG  Comments: Nonspecific anterolateral T-wave changes, unchanged from previous EKG.            Assessment/Plan   Deja was seen today for chest pain.    Diagnoses and all orders for this visit:    Left flank pain  -     ECG 12 Lead  -     Urinalysis With Microscopic If Indicated (No Culture) - Urine, Clean Catch; Future  -     Urinalysis With Microscopic If Indicated (No Culture) - Urine, Clean Catch    Rash    Other orders  -     valACYclovir (VALTREX) 1000 MG tablet; Take 1 tablet by mouth 3 (Three) Times a Day for 7 days.      Discussed with her.  She may have early shingles.

## 2019-01-09 ENCOUNTER — TELEPHONE (OUTPATIENT)
Dept: INTERNAL MEDICINE | Facility: CLINIC | Age: 74
End: 2019-01-09

## 2019-01-09 NOTE — TELEPHONE ENCOUNTER
----- Message from Maura Stout sent at 1/9/2019  8:25 AM EST -----  Contact: pt - Dr Smiley' pt - RE: new Rx  Pt calling and would like a Rx for pain medication. She informs is taking Tylenol but is not helping. She informs is in a lot of pain, times wants to go to ER. She informs that pain bad where takes breath away. Could you please call pt to discuss? Please advise. Thanks          Pt # [phone]

## 2019-01-09 NOTE — TELEPHONE ENCOUNTER
Please call in ultram 50 mgm, one every 4 hours as needed for pain, #[phone].  She should also come in for re-evaluation.

## 2019-01-10 ENCOUNTER — APPOINTMENT (OUTPATIENT)
Dept: WOMENS IMAGING | Facility: HOSPITAL | Age: 74
End: 2019-01-10

## 2019-01-10 ENCOUNTER — OFFICE VISIT (OUTPATIENT)
Dept: INTERNAL MEDICINE | Facility: CLINIC | Age: 74
End: 2019-01-10

## 2019-01-10 VITALS
DIASTOLIC BLOOD PRESSURE: 80 MMHG | WEIGHT: 168 LBS | BODY MASS INDEX: 27.96 KG/M2 | TEMPERATURE: 98.3 F | SYSTOLIC BLOOD PRESSURE: 120 MMHG

## 2019-01-10 DIAGNOSIS — B02.8 HERPES ZOSTER WITH OTHER COMPLICATION: Primary | ICD-10-CM

## 2019-01-10 PROCEDURE — 71046 X-RAY EXAM CHEST 2 VIEWS: CPT | Performed by: RADIOLOGY

## 2019-01-10 PROCEDURE — 71046 X-RAY EXAM CHEST 2 VIEWS: CPT | Performed by: INTERNAL MEDICINE

## 2019-01-10 PROCEDURE — 99213 OFFICE O/P EST LOW 20 MIN: CPT | Performed by: INTERNAL MEDICINE

## 2019-01-10 RX ORDER — TRAMADOL HYDROCHLORIDE 50 MG/1
TABLET ORAL
Qty: 120 TABLET | Refills: 1 | Status: SHIPPED | OUTPATIENT
Start: 2019-01-10 | End: 2019-02-14

## 2019-01-10 RX ORDER — TRAMADOL HYDROCHLORIDE 50 MG/1
1 TABLET ORAL
COMMUNITY
Start: 2019-01-09 | End: 2019-01-10 | Stop reason: SDUPTHER

## 2019-01-10 RX ORDER — GABAPENTIN 300 MG/1
CAPSULE ORAL
Qty: 90 CAPSULE | Refills: 1 | Status: SHIPPED | OUTPATIENT
Start: 2019-01-10 | End: 2019-02-14

## 2019-01-10 NOTE — PATIENT INSTRUCTIONS
You may take ultram (tramadol) 1-2 capsules every 4-6 hours.  In addition to this, we will add gabapentin which helps nerve pain.  You will start this by taking one at bedtime for 3 nights.  Then, you may take one in the morning an one at bedtime for 3 days.  Then you may take one three times a day.    You may also take tylenol.

## 2019-01-10 NOTE — PROGRESS NOTES
Chief Complaint   Patient presents with   • Flank Pain     left    • Back Pain       Subjective   Deja Juárez is a 73 y.o. female.     She continues to have back pain on her left chest.  Under her left breast to her back.  Feels like a stabbing pain.  She has not noticed any aggravating factors.  It might be little better if she lies on her right side. It hurts when she coughs and when she takes deep breaths.  She has not really had a cough. No fever. Rash she had at her initial evaluation seems to have progressed. She has completed course of valacyclovir.          The following portions of the patient's history were reviewed and updated as appropriate: allergies, current medications, past family history, past medical history, past social history, past surgical history and problem list.    Review of Systems   Constitutional: Positive for fatigue. Negative for fever.   Respiratory: Negative for cough, shortness of breath and wheezing.    Cardiovascular: Positive for chest pain (chest wall pain). Negative for palpitations and leg swelling.         Current Outpatient Medications:   •  atorvastatin (LIPITOR) 40 MG tablet, TAKE ONE TABLET BY MOUTH DAILY, Disp: 90 tablet, Rfl: 0  •  calcium carbonate-vitamin d (CALTRATE 600+D) 600-400 MG-UNIT per tablet, Take 1 tablet by mouth., Disp: , Rfl:   •  Cholecalciferol (VITAMIN D) 1000 UNITS tablet, Take 1,000 Units by mouth., Disp: , Rfl:   •  famotidine (PEPCID) 20 MG tablet, Take 20 mg by mouth Daily., Disp: , Rfl:   •  Multiple Vitamin (MULTIVITAMIN) tablet, Take 1 tablet by mouth Daily., Disp: , Rfl:   •  traMADol (ULTRAM) 50 MG tablet, Take one to two tablets every 6 hours as needed for pain, Disp: 120 tablet, Rfl: 1  •  gabapentin (NEURONTIN) 300 MG capsule, Take one at bedtime for 3 nights, then one twice a day for 3 days then one three times a day, Disp: 90 capsule, Rfl: 1        Objective     /80 (BP Location: Right arm, Patient Position: Sitting, Cuff Size:  Adult)   Temp 98.3 °F (36.8 °C)   Wt 76.2 kg (168 lb)   BMI 27.96 kg/m²     Physical Exam   Constitutional: She appears well-developed and well-nourished. No distress.   Cardiovascular: Normal rate, regular rhythm and normal heart sounds.   Pulmonary/Chest: Effort normal and breath sounds normal. No respiratory distress.   Skin: Rash (blisters on erythematous base left chest wall) noted.   Nursing note and vitals reviewed.        Assessment/Plan   Deja was seen today for flank pain and back pain.    Diagnoses and all orders for this visit:    Herpes zoster with other complication  -     XR Chest PA & Lateral    Other orders  -     traMADol (ULTRAM) 50 MG tablet; Take one to two tablets every 6 hours as needed for pain  -     gabapentin (NEURONTIN) 300 MG capsule; Take one at bedtime for 3 nights, then one twice a day for 3 days then one three times a day      Advised her she could take the tramadol as frequently as every 4 hours.  She may also take Tylenol with it.

## 2019-02-14 ENCOUNTER — OFFICE VISIT (OUTPATIENT)
Dept: INTERNAL MEDICINE | Facility: CLINIC | Age: 74
End: 2019-02-14

## 2019-02-14 VITALS
BODY MASS INDEX: 27.82 KG/M2 | HEIGHT: 65 IN | WEIGHT: 167 LBS | TEMPERATURE: 98.4 F | SYSTOLIC BLOOD PRESSURE: 138 MMHG | OXYGEN SATURATION: 98 % | HEART RATE: 80 BPM | DIASTOLIC BLOOD PRESSURE: 80 MMHG

## 2019-02-14 DIAGNOSIS — F41.9 ANXIETY: ICD-10-CM

## 2019-02-14 DIAGNOSIS — I51.7 CARDIOMEGALY: ICD-10-CM

## 2019-02-14 DIAGNOSIS — R00.2 PALPITATIONS: Primary | ICD-10-CM

## 2019-02-14 LAB
ALBUMIN SERPL-MCNC: 4.5 G/DL (ref 3.5–5.2)
ALBUMIN/GLOB SERPL: 1.4 G/DL
ALP SERPL-CCNC: 93 U/L (ref 39–117)
ALT SERPL W P-5'-P-CCNC: 15 U/L (ref 1–33)
ANION GAP SERPL CALCULATED.3IONS-SCNC: 11.8 MMOL/L
AST SERPL-CCNC: 13 U/L (ref 1–32)
BASOPHILS # BLD AUTO: 0.08 10*3/MM3 (ref 0–0.2)
BASOPHILS NFR BLD AUTO: 0.9 % (ref 0–1.5)
BILIRUB SERPL-MCNC: 0.9 MG/DL (ref 0.1–1.2)
BUN BLD-MCNC: 13 MG/DL (ref 8–23)
BUN/CREAT SERPL: 18.8 (ref 7–25)
CALCIUM SPEC-SCNC: 10.2 MG/DL (ref 8.6–10.5)
CHLORIDE SERPL-SCNC: 98 MMOL/L (ref 98–107)
CO2 SERPL-SCNC: 26.2 MMOL/L (ref 22–29)
CREAT BLD-MCNC: 0.69 MG/DL (ref 0.57–1)
DEPRECATED RDW RBC AUTO: 41.6 FL (ref 37–54)
EOSINOPHIL # BLD AUTO: 0.16 10*3/MM3 (ref 0–0.4)
EOSINOPHIL NFR BLD AUTO: 1.8 % (ref 0.3–6.2)
ERYTHROCYTE [DISTWIDTH] IN BLOOD BY AUTOMATED COUNT: 12.8 % (ref 12.3–15.4)
GFR SERPL CREATININE-BSD FRML MDRD: 83 ML/MIN/1.73
GLOBULIN UR ELPH-MCNC: 3.2 GM/DL
GLUCOSE BLD-MCNC: 95 MG/DL (ref 65–99)
HCT VFR BLD AUTO: 42.3 % (ref 34–46.6)
HGB BLD-MCNC: 13.8 G/DL (ref 12–15.9)
LYMPHOCYTES # BLD AUTO: 2.24 10*3/MM3 (ref 0.7–3.1)
LYMPHOCYTES NFR BLD AUTO: 25.6 % (ref 19.6–45.3)
MCH RBC QN AUTO: 29.3 PG (ref 26.6–33)
MCHC RBC AUTO-ENTMCNC: 32.6 G/DL (ref 31.5–35.7)
MCV RBC AUTO: 89.8 FL (ref 79–97)
MONOCYTES # BLD AUTO: 0.7 10*3/MM3 (ref 0.1–0.9)
MONOCYTES NFR BLD AUTO: 8 % (ref 5–12)
NEUTROPHILS # BLD AUTO: 5.57 10*3/MM3 (ref 1.4–7)
NEUTROPHILS NFR BLD AUTO: 63.7 % (ref 42.7–76)
PLATELET # BLD AUTO: 341 10*3/MM3 (ref 140–450)
PMV BLD AUTO: 10 FL (ref 6–12)
POTASSIUM BLD-SCNC: 4.9 MMOL/L (ref 3.5–5.2)
PROT SERPL-MCNC: 7.7 G/DL (ref 6–8.5)
RBC # BLD AUTO: 4.71 10*6/MM3 (ref 3.77–5.28)
SODIUM BLD-SCNC: 136 MMOL/L (ref 136–145)
TSH SERPL DL<=0.05 MIU/L-ACNC: 3.85 MIU/ML (ref 0.27–4.2)
WBC NRBC COR # BLD: 8.75 10*3/MM3 (ref 3.4–10.8)

## 2019-02-14 PROCEDURE — 84443 ASSAY THYROID STIM HORMONE: CPT | Performed by: NURSE PRACTITIONER

## 2019-02-14 PROCEDURE — 85025 COMPLETE CBC W/AUTO DIFF WBC: CPT | Performed by: NURSE PRACTITIONER

## 2019-02-14 PROCEDURE — 36415 COLL VENOUS BLD VENIPUNCTURE: CPT | Performed by: NURSE PRACTITIONER

## 2019-02-14 PROCEDURE — 99214 OFFICE O/P EST MOD 30 MIN: CPT | Performed by: NURSE PRACTITIONER

## 2019-02-14 PROCEDURE — 93000 ELECTROCARDIOGRAM COMPLETE: CPT | Performed by: NURSE PRACTITIONER

## 2019-02-14 PROCEDURE — 80053 COMPREHEN METABOLIC PANEL: CPT | Performed by: NURSE PRACTITIONER

## 2019-02-14 RX ORDER — ASPIRIN 81 MG/1
81 TABLET ORAL DAILY
COMMUNITY
End: 2019-10-16

## 2019-02-18 DIAGNOSIS — R00.2 PALPITATIONS: Primary | ICD-10-CM

## 2019-03-04 ENCOUNTER — HOSPITAL ENCOUNTER (OUTPATIENT)
Dept: CARDIOLOGY | Facility: HOSPITAL | Age: 74
Discharge: HOME | End: 2019-03-04
Admitting: NURSE PRACTITIONER

## 2019-03-04 VITALS
BODY MASS INDEX: 27.84 KG/M2 | HEART RATE: 75 BPM | HEIGHT: 65 IN | SYSTOLIC BLOOD PRESSURE: 139 MMHG | WEIGHT: 167.11 LBS | DIASTOLIC BLOOD PRESSURE: 81 MMHG

## 2019-03-04 DIAGNOSIS — I51.7 CARDIOMEGALY: ICD-10-CM

## 2019-03-04 LAB
ASCENDING AORTA: 3.5 CM
BH CV ECHO MEAS - ACS: 2.1 CM
BH CV ECHO MEAS - AO ROOT AREA (BSA CORRECTED): 1.7
BH CV ECHO MEAS - AO ROOT AREA: 8.3 CM^2
BH CV ECHO MEAS - AO ROOT DIAM: 3.2 CM
BH CV ECHO MEAS - BSA(HAYCOCK): 1.9 M^2
BH CV ECHO MEAS - BSA: 1.9 M^2
BH CV ECHO MEAS - BZI_BMI: 29.3 KILOGRAMS/M^2
BH CV ECHO MEAS - BZI_METRIC_HEIGHT: 165 CM
BH CV ECHO MEAS - BZI_METRIC_WEIGHT: 79.8 KG
BH CV ECHO MEAS - EDV(MOD-SP2): 62 ML
BH CV ECHO MEAS - EDV(MOD-SP4): 66 ML
BH CV ECHO MEAS - EDV(TEICH): 166.6 ML
BH CV ECHO MEAS - EF(CUBED): 63.4 %
BH CV ECHO MEAS - EF(MOD-BP): 57 %
BH CV ECHO MEAS - EF(MOD-SP2): 54.8 %
BH CV ECHO MEAS - EF(MOD-SP4): 57.6 %
BH CV ECHO MEAS - EF(TEICH): 54.2 %
BH CV ECHO MEAS - ESV(MOD-SP2): 28 ML
BH CV ECHO MEAS - ESV(MOD-SP4): 28 ML
BH CV ECHO MEAS - ESV(TEICH): 76.3 ML
BH CV ECHO MEAS - IVS/LVPW: 1
BH CV ECHO MEAS - IVSD: 1 CM
BH CV ECHO MEAS - LAT PEAK E' VEL: 9 CM/SEC
BH CV ECHO MEAS - LV DIASTOLIC VOL/BSA (35-75): 35.2 ML/M^2
BH CV ECHO MEAS - LV MASS(C)D: 238.6 GRAMS
BH CV ECHO MEAS - LV MASS(C)DI: 127.4 GRAMS/M^2
BH CV ECHO MEAS - LV SYSTOLIC VOL/BSA (12-30): 15 ML/M^2
BH CV ECHO MEAS - LVIDD: 5.8 CM
BH CV ECHO MEAS - LVIDS: 4.1 CM
BH CV ECHO MEAS - LVLD AP2: 6.6 CM
BH CV ECHO MEAS - LVLD AP4: 7.1 CM
BH CV ECHO MEAS - LVLS AP2: 5.7 CM
BH CV ECHO MEAS - LVLS AP4: 6 CM
BH CV ECHO MEAS - LVOT AREA (M): 3.1 CM^2
BH CV ECHO MEAS - LVOT AREA: 3.2 CM^2
BH CV ECHO MEAS - LVOT DIAM: 2 CM
BH CV ECHO MEAS - LVPWD: 1 CM
BH CV ECHO MEAS - MED PEAK E' VEL: 7 CM/SEC
BH CV ECHO MEAS - MR MAX PG: 93.4 MMHG
BH CV ECHO MEAS - MR MAX VEL: 483.2 CM/SEC
BH CV ECHO MEAS - MV A DUR: 0.11 SEC
BH CV ECHO MEAS - MV A MAX VEL: 87.8 CM/SEC
BH CV ECHO MEAS - MV DEC SLOPE: 391.8 CM/SEC^2
BH CV ECHO MEAS - MV DEC TIME: 0.15 SEC
BH CV ECHO MEAS - MV E MAX VEL: 62.6 CM/SEC
BH CV ECHO MEAS - MV E/A: 0.71
BH CV ECHO MEAS - MV MAX PG: 3.2 MMHG
BH CV ECHO MEAS - MV MEAN PG: 1.5 MMHG
BH CV ECHO MEAS - MV P1/2T MAX VEL: 63.1 CM/SEC
BH CV ECHO MEAS - MV P1/2T: 47.1 MSEC
BH CV ECHO MEAS - MV V2 MAX: 89.2 CM/SEC
BH CV ECHO MEAS - MV V2 MEAN: 57.6 CM/SEC
BH CV ECHO MEAS - MV V2 VTI: 24.1 CM
BH CV ECHO MEAS - MVA P1/2T LCG: 3.5 CM^2
BH CV ECHO MEAS - MVA(P1/2T): 4.7 CM^2
BH CV ECHO MEAS - PA ACC TIME: 0.14 SEC
BH CV ECHO MEAS - PA MAX PG (FULL): 0.84 MMHG
BH CV ECHO MEAS - PA MAX PG: 3.7 MMHG
BH CV ECHO MEAS - PA PR(ACCEL): 14 MMHG
BH CV ECHO MEAS - PA V2 MAX: 95.6 CM/SEC
BH CV ECHO MEAS - PULM A REVS DUR: 0.1 SEC
BH CV ECHO MEAS - PULM A REVS VEL: 31 CM/SEC
BH CV ECHO MEAS - PULM DIAS VEL: 50.9 CM/SEC
BH CV ECHO MEAS - PULM S/D: 1.4
BH CV ECHO MEAS - PULM SYS VEL: 71.3 CM/SEC
BH CV ECHO MEAS - PVA(V,A): 2 CM^2
BH CV ECHO MEAS - PVA(V,D): 2 CM^2
BH CV ECHO MEAS - RV MAX PG: 2.8 MMHG
BH CV ECHO MEAS - RV MEAN PG: 1.8 MMHG
BH CV ECHO MEAS - RV V1 MAX: 83.9 CM/SEC
BH CV ECHO MEAS - RV V1 MEAN: 64.1 CM/SEC
BH CV ECHO MEAS - RV V1 VTI: 17.6 CM
BH CV ECHO MEAS - RVOT AREA: 2.3 CM^2
BH CV ECHO MEAS - RVOT DIAM: 1.7 CM
BH CV ECHO MEAS - SI(CUBED): 66.1 ML/M^2
BH CV ECHO MEAS - SI(MOD-SP2): 18.2 ML/M^2
BH CV ECHO MEAS - SI(MOD-SP4): 20.3 ML/M^2
BH CV ECHO MEAS - SI(TEICH): 48.2 ML/M^2
BH CV ECHO MEAS - SUP REN AO DIAM: 2.2 CM
BH CV ECHO MEAS - SV(CUBED): 123.7 ML
BH CV ECHO MEAS - SV(MOD-SP2): 34 ML
BH CV ECHO MEAS - SV(MOD-SP4): 38 ML
BH CV ECHO MEAS - SV(RVOT): 40.5 ML
BH CV ECHO MEAS - SV(TEICH): 90.2 ML
BH CV ECHO MEAS - TAPSE (>1.6): 1.9 CM2
BH CV ECHO MEAS - TR MAX VEL: 205.6 CM/SEC
BH CV ECHO MEASUREMENTS AVERAGE E/E' RATIO: 7.83
BH CV XLRA - RV BASE: 2.8 CM
BH CV XLRA - TDI S': 10 CM/SEC
LEFT ATRIUM VOLUME INDEX: 24 ML/M2
LV EF 2D ECHO EST: 57 %
SINUS: 3.5 CM
STJ: 2.8 CM

## 2019-03-04 PROCEDURE — 93306 TTE W/DOPPLER COMPLETE: CPT

## 2019-03-04 PROCEDURE — 93306 TTE W/DOPPLER COMPLETE: CPT | Performed by: INTERNAL MEDICINE

## 2019-03-07 ENCOUNTER — OFFICE VISIT (OUTPATIENT)
Dept: INTERNAL MEDICINE | Facility: CLINIC | Age: 74
End: 2019-03-07

## 2019-03-07 VITALS
HEIGHT: 65 IN | WEIGHT: 167 LBS | BODY MASS INDEX: 27.82 KG/M2 | OXYGEN SATURATION: 98 % | SYSTOLIC BLOOD PRESSURE: 120 MMHG | DIASTOLIC BLOOD PRESSURE: 78 MMHG | HEART RATE: 84 BPM

## 2019-03-07 DIAGNOSIS — R00.2 PALPITATIONS: Primary | ICD-10-CM

## 2019-03-07 DIAGNOSIS — F41.9 ANXIETY: ICD-10-CM

## 2019-03-07 PROCEDURE — 99213 OFFICE O/P EST LOW 20 MIN: CPT | Performed by: NURSE PRACTITIONER

## 2019-03-08 DIAGNOSIS — R00.2 PALPITATIONS: Primary | ICD-10-CM

## 2019-03-08 DIAGNOSIS — I49.3 PVC (PREMATURE VENTRICULAR CONTRACTION): ICD-10-CM

## 2019-03-08 RX ORDER — METOPROLOL SUCCINATE 25 MG/1
25 TABLET, EXTENDED RELEASE ORAL DAILY
Qty: 30 TABLET | Refills: 2 | Status: SHIPPED | OUTPATIENT
Start: 2019-03-08 | End: 2019-07-04 | Stop reason: SDUPTHER

## 2019-03-08 NOTE — PROGRESS NOTES
"Subjective   Deja Juárez is a 74 y.o. female who presents for f/u regarding palpitations.    She denies additional episodes of \"fluttering,\" (first noticed when visiting her brother in the hospital) her echo showed mild thickening of aortic valve. Her holter monitor is still pending (turned equipment in this morning). She did stop drinking caffeinated tea which she states improved her symptoms.      Palpitations    This is a new problem. The current episode started 1 to 4 weeks ago. The problem occurs intermittently. The problem has been resolved. The symptoms are aggravated by caffeine and stress. Associated symptoms include anxiety (states sx are more severe when anxious), an irregular heartbeat and malaise/fatigue. Pertinent negatives include no chest pain, coughing, fever, nausea, near-syncope, shortness of breath, vomiting or weakness. Treatments tried: change in diet. The treatment provided significant relief.        The following portions of the patient's history were reviewed and updated as appropriate: allergies, current medications, past social history and problem list.    Past Medical History:   Diagnosis Date   • Anxiety    • Depression    • GERD (gastroesophageal reflux disease)    • Hyperlipidemia    • Scoliosis          Current Outpatient Medications:   •  aspirin 81 MG EC tablet, Take 81 mg by mouth Daily., Disp: , Rfl:   •  atorvastatin (LIPITOR) 40 MG tablet, TAKE ONE TABLET BY MOUTH DAILY, Disp: 90 tablet, Rfl: 0  •  calcium carbonate-vitamin d (CALTRATE 600+D) 600-400 MG-UNIT per tablet, Take 1 tablet by mouth., Disp: , Rfl:   •  Cholecalciferol (VITAMIN D) 1000 UNITS tablet, Take 1,000 Units by mouth., Disp: , Rfl:   •  famotidine (PEPCID) 20 MG tablet, Take 20 mg by mouth Daily., Disp: , Rfl:   •  Multiple Vitamin (MULTIVITAMIN) tablet, Take 1 tablet by mouth Daily., Disp: , Rfl:     No Known Allergies    Review of Systems   Constitutional: Positive for malaise/fatigue. Negative for chills, " "fatigue, fever and unexpected weight change.   HENT: Negative for congestion, ear pain, postnasal drip, sinus pressure, sore throat and trouble swallowing.    Eyes: Negative for visual disturbance.   Respiratory: Negative for cough, chest tightness, shortness of breath and wheezing.    Cardiovascular: Positive for palpitations. Negative for chest pain, leg swelling and near-syncope.   Gastrointestinal: Negative for abdominal pain, blood in stool, nausea and vomiting.   Genitourinary: Negative for dysuria, frequency and urgency.   Musculoskeletal: Negative for arthralgias and joint swelling.   Skin: Negative for color change.   Neurological: Negative for syncope, weakness and headaches.   Hematological: Does not bruise/bleed easily.   Psychiatric/Behavioral: The patient is nervous/anxious (states sx are more severe when anxious).        Objective   Vitals:    03/07/19 1101   BP: 120/78   BP Location: Left arm   Patient Position: Sitting   Cuff Size: Adult   Pulse: 84   SpO2: 98%   Weight: 75.8 kg (167 lb)   Height: 165.1 cm (65\")     Physical Exam   Constitutional: She appears well-developed and well-nourished. She is cooperative. She does not have a sickly appearance. She does not appear ill.   HENT:   Head: Normocephalic.   Right Ear: Hearing, tympanic membrane and external ear normal.   Left Ear: Hearing, tympanic membrane and external ear normal.   Nose: Nose normal. No mucosal edema, rhinorrhea, sinus tenderness or nasal deformity. Right sinus exhibits no maxillary sinus tenderness and no frontal sinus tenderness. Left sinus exhibits no maxillary sinus tenderness and no frontal sinus tenderness.   Mouth/Throat: Oropharynx is clear and moist and mucous membranes are normal. Normal dentition.   Eyes: Conjunctivae and lids are normal. Right eye exhibits no discharge and no exudate. Left eye exhibits no discharge and no exudate.   Neck: Trachea normal. Carotid bruit is not present. No edema present. No thyroid mass " present.   Cardiovascular: Regular rhythm, normal heart sounds and normal pulses.   No murmur heard.  Pulmonary/Chest: Breath sounds normal. No respiratory distress. She has no decreased breath sounds. She has no wheezes. She has no rhonchi. She has no rales.   Lymphadenopathy:        Head (right side): No submental, no submandibular, no tonsillar, no preauricular, no posterior auricular and no occipital adenopathy present.        Head (left side): No submental, no submandibular, no tonsillar, no preauricular, no posterior auricular and no occipital adenopathy present.   Neurological: She is alert.   Skin: Skin is warm, dry and intact. No cyanosis. Nails show no clubbing.       Assessment/Plan   Deja was seen today for follow-up.    Diagnoses and all orders for this visit:    Palpitations    Anxiety    Reviewed echo results with patient, will follow results of Holter monitor. Discussed anxiety and stress management with patient, she has been intolerant to multiple SSRIs. Continue to monitor sx and consider further evaluation based on results of Holter monitor.

## 2019-05-03 ENCOUNTER — OFFICE VISIT (OUTPATIENT)
Dept: INTERNAL MEDICINE | Facility: CLINIC | Age: 74
End: 2019-05-03

## 2019-05-03 VITALS
WEIGHT: 161 LBS | BODY MASS INDEX: 26.82 KG/M2 | OXYGEN SATURATION: 95 % | HEART RATE: 61 BPM | SYSTOLIC BLOOD PRESSURE: 126 MMHG | DIASTOLIC BLOOD PRESSURE: 76 MMHG | HEIGHT: 65 IN

## 2019-05-03 DIAGNOSIS — E78.5 HYPERLIPIDEMIA, UNSPECIFIED HYPERLIPIDEMIA TYPE: ICD-10-CM

## 2019-05-03 DIAGNOSIS — Z00.00 MEDICARE ANNUAL WELLNESS VISIT, SUBSEQUENT: Primary | ICD-10-CM

## 2019-05-03 DIAGNOSIS — Z12.11 SCREENING FOR COLON CANCER: ICD-10-CM

## 2019-05-03 PROCEDURE — G0439 PPPS, SUBSEQ VISIT: HCPCS | Performed by: INTERNAL MEDICINE

## 2019-05-03 PROCEDURE — 99213 OFFICE O/P EST LOW 20 MIN: CPT | Performed by: INTERNAL MEDICINE

## 2019-05-03 NOTE — PATIENT INSTRUCTIONS
Medicare Wellness  Personal Prevention Plan of Service     Date of Office Visit:  2019  Encounter Provider:  Vicky Smiley MD  Place of Service:  [facility]  Patient Name: Deja Juárez  :  1945    As part of the Medicare Wellness portion of your visit today, we are providing you with this personalized preventive plan of services (PPPS). This plan is based upon recommendations of the [place] Preventive Services Task Force (USPSTF) and the Advisory Committee on Immunization Practices (ACIP).    This lists the preventive care services that should be considered, and provides dates of when you are due. Items listed as completed are up-to-date and do not require any further intervention.    Health Maintenance   Topic Date Due   • ZOSTER VACCINE (1 of 2) 1996   • HEPATITIS C SCREENING  2016   • COLONOSCOPY  2016   • MEDICARE ANNUAL WELLNESS  10/19/2018   • INFLUENZA VACCINE  2019   • MAMMOGRAM  10/26/2019   • LIPID PANEL  10/26/2019   • DXA SCAN  2019   • TDAP/TD VACCINES (2 - Td) 2022   • PNEUMOCOCCAL VACCINES (65+ LOW/MEDIUM RISK)  Completed       Orders Placed This Encounter   Procedures   • Ambulatory Referral For Screening Colonoscopy     Referral Priority:   Routine     Referral Type:   Diagnostic Medical     Referral Reason:   Specialty Services Required     Referred to Provider:   Yonatan Santiago MD     Number of Visits Requested:   1       Return in about 6 months (around 11/3/2019) for Follow up, Fasting.      Check with your pharmacy about the new shingles (zoster) vaccine.    You may reduce metoprolol to 1/2 tablet and take it at bed time

## 2019-05-03 NOTE — PROGRESS NOTES
Chief Complaint   Patient presents with   • Medicare Wellness-subsequent   • Hyperlipidemia       Subjective   Deja Juárez is a 74 y.o. female.     Hyperlipidemia   This is a chronic problem. The problem is controlled. Recent lipid tests were reviewed and are variable. She has no history of diabetes or hypothyroidism. There are no known factors aggravating her hyperlipidemia. Pertinent negatives include no chest pain or shortness of breath. Current antihyperlipidemic treatment includes statins, diet change and exercise. The current treatment provides significant improvement of lipids. There are no compliance problems.    Palpitations    This is a chronic problem. The current episode started more than 1 month ago. The problem occurs rarely. The problem has been resolved. Nothing aggravates the symptoms. Pertinent negatives include no chest pain, coughing or shortness of breath. She has tried beta blockers for the symptoms. The treatment provided significant relief.   She is taking metoprolol XL 25 mg in the morning.  She thinks it makes her feel tired.    The following portions of the patient's history were reviewed and updated as appropriate: allergies, current medications, past family history, past medical history, past social history, past surgical history and problem list.    Review of Systems   Constitutional: Negative for appetite change.   HENT: Negative for nosebleeds.    Eyes: Negative for blurred vision and double vision.   Respiratory: Negative for cough and shortness of breath.    Cardiovascular: Negative for chest pain, palpitations and leg swelling.         Current Outpatient Medications:   •  aspirin 81 MG EC tablet, Take 81 mg by mouth Daily., Disp: , Rfl:   •  atorvastatin (LIPITOR) 40 MG tablet, TAKE ONE TABLET BY MOUTH DAILY, Disp: 90 tablet, Rfl: 0  •  calcium carbonate-vitamin d (CALTRATE 600+D) 600-400 MG-UNIT per tablet, Take 1 tablet by mouth., Disp: , Rfl:   •  Cholecalciferol (VITAMIN D)  "1000 UNITS tablet, Take 1,000 Units by mouth., Disp: , Rfl:   •  famotidine (PEPCID) 20 MG tablet, Take 20 mg by mouth Daily., Disp: , Rfl:   •  metoprolol succinate XL (TOPROL-XL) 25 MG 24 hr tablet, Take 1 tablet by mouth Daily., Disp: 30 tablet, Rfl: 2  •  Multiple Vitamin (MULTIVITAMIN) tablet, Take 1 tablet by mouth Daily., Disp: , Rfl:         Objective     /76 (BP Location: Right arm, Patient Position: Sitting, Cuff Size: Adult)   Pulse 61   Ht 165.1 cm (65\")   Wt 73 kg (161 lb)   SpO2 95%   BMI 26.79 kg/m²     Physical Exam   Constitutional: She is oriented to person, place, and time. She appears well-developed and well-nourished. No distress.   Neck: Normal carotid pulses present. Carotid bruit is not present.   Cardiovascular: Regular rhythm, S1 normal and S2 normal. Exam reveals no gallop and no friction rub.   No murmur heard.  Pulses:       Carotid pulses are 2+ on the right side, and 2+ on the left side.  Pulmonary/Chest: Effort normal and breath sounds normal. She has no wheezes. She has no rhonchi. She has no rales. Chest wall is not dull to percussion.   Musculoskeletal: She exhibits no edema.   Neurological: She is alert and oriented to person, place, and time.   Skin: Skin is warm and dry.   Nursing note and vitals reviewed.        Assessment/Plan   Deja was seen today for medicare wellness-subsequent and hyperlipidemia.    Diagnoses and all orders for this visit:    Medicare annual wellness visit, subsequent    Screening for colon cancer  -     Ambulatory Referral For Screening Colonoscopy    Hyperlipidemia, unspecified hyperlipidemia type      Advised her she may try reducing metoprolol to 1/2 tablet and take it at bedtime.         "

## 2019-05-03 NOTE — PROGRESS NOTES
QUICK REFERENCE INFORMATION:  The ABCs of the Annual Wellness Visit    Subsequent Medicare Wellness Visit     HEALTH RISK ASSESSMENT    : 1945    Recent Hospitalizations:  No hospitalization(s) within the last year..  ccc      Current Medical Providers:  Patient Care Team:  Vicky Smiley MD as PCP - General (Internal Medicine)  Vicky Smiley MD as PCP - Claims Attributed  Asmita Fox APRN as Nurse Practitioner (Internal Medicine)        Smoking Status:  Social History     Tobacco Use   Smoking Status Former Smoker   • Packs/day: 1.00   • Years: 5.00   • Pack years: 5.00   • Types: Cigarettes   • Last attempt to quit:    • Years since quittin.3   Smokeless Tobacco Never Used   Tobacco Comment    CAFFEINE USE       Alcohol Consumption:  Social History     Substance and Sexual Activity   Alcohol Use No       Depression Screen:   PHQ-2/PHQ-9 Depression Screening 5/3/2019   Little interest or pleasure in doing things 0   Feeling down, depressed, or hopeless 0   Trouble falling or staying asleep, or sleeping too much 0   Feeling tired or having little energy 1   Poor appetite or overeating 0   Feeling bad about yourself - or that you are a failure or have let yourself or your family down 0   Trouble concentrating on things, such as reading the newspaper or watching television 0   Moving or speaking so slowly that other people could have noticed. Or the opposite - being so fidgety or restless that you have been moving around a lot more than usual 0   Thoughts that you would be better off dead, or of hurting yourself in some way 0   Total Score 1   If you checked off any problems, how difficult have these problems made it for you to do your work, take care of things at home, or get along with other people? -   She thinks metoprolol makes her tired    Health Habits and Functional and Cognitive Screening:  Functional & Cognitive Status 5/3/2019   Do you have difficulty preparing food and eating? No    Do you have difficulty bathing yourself, getting dressed or grooming yourself? No   Do you have difficulty using the toilet? No   Do you have difficulty moving around from place to place? No   Do you have trouble with steps or getting out of a bed or a chair? No   In the past year have you fallen or experienced a near fall? No   Current Diet Well Balanced Diet   Dental Exam Up to date   Eye Exam Up to date   Exercise (times per week) 0 times per week   Current Exercise Activities Include None   Do you need help using the phone?  No   Are you deaf or do you have serious difficulty hearing?  No   Do you need help with transportation? No   Do you need help shopping? No   Do you need help preparing meals?  No   Do you need help with housework?  No   Do you need help with laundry? No   Do you need help taking your medications? No   Do you need help managing money? No   Do you ever drive or ride in a car without wearing a seat belt? No   Have you felt unusual stress, anger or loneliness in the last month? No   Who do you live with? Spouse   If you need help, do you have trouble finding someone available to you? No   Have you been bothered in the last four weeks by sexual problems? No   Do you have difficulty concentrating, remembering or making decisions? No           Does the patient have evidence of cognitive impairment? No    Asiprin use counseling: recommended she reduce ASA to QOD      Recent Lab Results:    Lab Results   Component Value Date    GLU 93 04/18/2018        Lab Results   Component Value Date    CHOL 200 10/26/2018    TRIG 176 (H) 10/26/2018    HDL 57 10/26/2018    VLDL 35.2 10/26/2018    LDLHDL 1.89 10/26/2018           Age-appropriate Screening Schedule:  Refer to the list below for future screening recommendations based on patient's age, sex and/or medical conditions. Orders for these recommended tests are listed in the plan section. The patient has been provided with a written plan.    Health  Maintenance   Topic Date Due   • ZOSTER VACCINE (1 of 2) 01/11/1996   • COLONOSCOPY  04/13/2016   • INFLUENZA VACCINE  08/01/2019   • MAMMOGRAM  10/26/2019   • LIPID PANEL  10/26/2019   • DXA SCAN  11/01/2019   • TDAP/TD VACCINES (2 - Td) 04/25/2022   • PNEUMOCOCCAL VACCINES (65+ LOW/MEDIUM RISK)  Completed        Subjective   History of Present Illness    Deja Juárez is a 74 y.o. female who presents for an Annual Wellness Visit.    The following portions of the patient's history were reviewed and updated as appropriate: allergies, current medications, past family history, past medical history, past social history, past surgical history and problem list.    Outpatient Medications Prior to Visit   Medication Sig Dispense Refill   • aspirin 81 MG EC tablet Take 81 mg by mouth Daily.     • atorvastatin (LIPITOR) 40 MG tablet TAKE ONE TABLET BY MOUTH DAILY 90 tablet 0   • calcium carbonate-vitamin d (CALTRATE 600+D) 600-400 MG-UNIT per tablet Take 1 tablet by mouth.     • Cholecalciferol (VITAMIN D) 1000 UNITS tablet Take 1,000 Units by mouth.     • famotidine (PEPCID) 20 MG tablet Take 20 mg by mouth Daily.     • metoprolol succinate XL (TOPROL-XL) 25 MG 24 hr tablet Take 1 tablet by mouth Daily. 30 tablet 2   • Multiple Vitamin (MULTIVITAMIN) tablet Take 1 tablet by mouth Daily.       No facility-administered medications prior to visit.        Patient Active Problem List   Diagnosis   • Hyperlipidemia   • Primary localized osteoarthritis of left knee   • Preop cardiovascular exam   • Abnormal ECG   • Knee pain   • GERD (gastroesophageal reflux disease)       Advance Care Planning:  Patient has an advance directive - a copy has not been provided. Have asked the patient to send this to us to add to record    Identification of Risk Factors:  Risk factors include: weight .    Review of Systems    Compared to one year ago, the patient feels her physical health is the same.  Compared to one year ago, the patient feels  "her mental health is the same.    Objective     Physical Exam    Vitals:    05/03/19 0929   BP: 126/76   BP Location: Right arm   Patient Position: Sitting   Cuff Size: Adult   Pulse: 61   SpO2: 95%   Weight: 73 kg (161 lb)   Height: 165.1 cm (65\")       Patient's Body mass index is 26.79 kg/m². BMI is above normal parameters. Recommendations include: encouraged prudent diet, regular exercise.      Assessment/Plan   Patient Self-Management and Personalized Health Advice  The patient has been provided with information about: diet, exercise and weight management and preventive services including:   · Colorectal cancer screening, colonoscopy referral placed.    Visit Diagnoses:    ICD-10-CM ICD-9-CM   1. Medicare annual wellness visit, subsequent Z00.00 V70.0   2. Screening for colon cancer Z12.11 V76.51   3. Hyperlipidemia, unspecified hyperlipidemia type E78.5 272.4       Orders Placed This Encounter   Procedures   • Ambulatory Referral For Screening Colonoscopy     Referral Priority:   Routine     Referral Type:   Diagnostic Medical     Referral Reason:   Specialty Services Required     Referred to Provider:   Yonatan Santiago MD     Number of Visits Requested:   1       Outpatient Encounter Medications as of 5/3/2019   Medication Sig Dispense Refill   • aspirin 81 MG EC tablet Take 81 mg by mouth Daily.     • atorvastatin (LIPITOR) 40 MG tablet TAKE ONE TABLET BY MOUTH DAILY 90 tablet 0   • calcium carbonate-vitamin d (CALTRATE 600+D) 600-400 MG-UNIT per tablet Take 1 tablet by mouth.     • Cholecalciferol (VITAMIN D) 1000 UNITS tablet Take 1,000 Units by mouth.     • famotidine (PEPCID) 20 MG tablet Take 20 mg by mouth Daily.     • metoprolol succinate XL (TOPROL-XL) 25 MG 24 hr tablet Take 1 tablet by mouth Daily. 30 tablet 2   • Multiple Vitamin (MULTIVITAMIN) tablet Take 1 tablet by mouth Daily.       No facility-administered encounter medications on file as of 5/3/2019.        Reviewed use of high risk medication " in the elderly: yes (ASA)  Reviewed for potential of harmful drug interactions in the elderly: not applicable    Follow Up:  Return in about 6 months (around 11/3/2019) for Follow up, Fasting.     An After Visit Summary and PPPS with all of these plans were given to the patient.

## 2019-05-23 ENCOUNTER — OFFICE VISIT (OUTPATIENT)
Dept: INTERNAL MEDICINE | Facility: CLINIC | Age: 74
End: 2019-05-23

## 2019-05-23 VITALS
DIASTOLIC BLOOD PRESSURE: 80 MMHG | WEIGHT: 165 LBS | SYSTOLIC BLOOD PRESSURE: 120 MMHG | BODY MASS INDEX: 27.46 KG/M2 | TEMPERATURE: 97.7 F

## 2019-05-23 DIAGNOSIS — B36.9 FUNGAL DERMATITIS: Primary | ICD-10-CM

## 2019-05-23 PROCEDURE — 99213 OFFICE O/P EST LOW 20 MIN: CPT | Performed by: NURSE PRACTITIONER

## 2019-05-23 RX ORDER — CLOTRIMAZOLE 1 %
CREAM (GRAM) TOPICAL 2 TIMES DAILY
Qty: 40 G | Refills: 0 | Status: SHIPPED | OUTPATIENT
Start: 2019-05-23 | End: 2019-10-03

## 2019-05-23 NOTE — PROGRESS NOTES
Subjective   Deja Juárez is a 74 y.o. female.       She presents with a rash under her left breast x 4 days. She had shingles 3 months ago on her back.      Rash   This is a new problem. The rash is characterized by redness and itchiness (no burning no pain). She was exposed to nothing. Associated symptoms include congestion, fatigue and rhinorrhea. Pertinent negatives include no cough, diarrhea, fever, shortness of breath, sore throat or vomiting. Treatments tried: neosporin, hydrocortisone. The treatment provided mild relief.        The following portions of the patient's history were reviewed and updated as appropriate: allergies, current medications, past family history, past medical history, past social history, past surgical history and problem list.    Review of Systems   Constitutional: Positive for fatigue. Negative for fever.   HENT: Positive for congestion, rhinorrhea and sinus pressure. Negative for postnasal drip and sore throat.    Respiratory: Negative for cough and shortness of breath.    Gastrointestinal: Negative for diarrhea and vomiting.   Musculoskeletal: Positive for arthralgias (chronic).   Skin: Positive for rash. Negative for wound.       Objective   Physical Exam   Constitutional: She appears well-developed and well-nourished.   HENT:   Head: Normocephalic and atraumatic.   Cardiovascular: Normal rate, regular rhythm and normal heart sounds.   No murmur heard.  Pulmonary/Chest: Effort normal and breath sounds normal. No respiratory distress.   Musculoskeletal: Normal range of motion.   Neurological: She is alert.   Skin: Skin is warm and dry.   erythematous rash with satellite lesions in crease of L breast and upper abdomen   Psychiatric: She has a normal mood and affect. Her behavior is normal. Judgment and thought content normal.   Vitals reviewed.      Assessment/Plan   Deja was seen today for rash.    Diagnoses and all orders for this visit:    Fungal dermatitis  -      clotrimazole (LOTRIMIN) 1 % cream; Apply  topically to the appropriate area as directed 2 (Two) Times a Day.    Make sure to keep area clean and dry.    Return for worsening of sx.

## 2019-07-04 DIAGNOSIS — I49.3 PVC (PREMATURE VENTRICULAR CONTRACTION): ICD-10-CM

## 2019-07-04 DIAGNOSIS — R00.2 PALPITATIONS: ICD-10-CM

## 2019-07-05 ENCOUNTER — PREP FOR SURGERY (OUTPATIENT)
Dept: OTHER | Facility: HOSPITAL | Age: 74
End: 2019-07-05

## 2019-07-05 DIAGNOSIS — Z12.11 SCREEN FOR COLON CANCER: Primary | ICD-10-CM

## 2019-07-05 RX ORDER — METOPROLOL SUCCINATE 25 MG/1
TABLET, EXTENDED RELEASE ORAL
Qty: 30 TABLET | Refills: 5 | Status: SHIPPED | OUTPATIENT
Start: 2019-07-05 | End: 2020-02-17

## 2019-10-03 ENCOUNTER — OFFICE VISIT (OUTPATIENT)
Dept: CARDIOLOGY | Facility: CLINIC | Age: 74
End: 2019-10-03

## 2019-10-03 ENCOUNTER — TELEPHONE (OUTPATIENT)
Dept: CARDIOLOGY | Facility: CLINIC | Age: 74
End: 2019-10-03

## 2019-10-03 VITALS
BODY MASS INDEX: 26.56 KG/M2 | HEIGHT: 65 IN | HEART RATE: 53 BPM | SYSTOLIC BLOOD PRESSURE: 130 MMHG | WEIGHT: 159.4 LBS | DIASTOLIC BLOOD PRESSURE: 80 MMHG

## 2019-10-03 DIAGNOSIS — E78.5 HYPERLIPIDEMIA, UNSPECIFIED HYPERLIPIDEMIA TYPE: ICD-10-CM

## 2019-10-03 DIAGNOSIS — R94.31 ABNORMAL ECG: Primary | ICD-10-CM

## 2019-10-03 DIAGNOSIS — Z01.810 PREOP CARDIOVASCULAR EXAM: ICD-10-CM

## 2019-10-03 PROCEDURE — 93000 ELECTROCARDIOGRAM COMPLETE: CPT | Performed by: INTERNAL MEDICINE

## 2019-10-03 PROCEDURE — 99214 OFFICE O/P EST MOD 30 MIN: CPT | Performed by: INTERNAL MEDICINE

## 2019-10-03 RX ORDER — CHOLECALCIFEROL (VITAMIN D3) 125 MCG
5 CAPSULE ORAL NIGHTLY PRN
COMMUNITY
End: 2020-06-05

## 2019-10-03 RX ORDER — ACETAMINOPHEN,DIPHENHYDRAMINE HCL 500; 25 MG/1; MG/1
1 TABLET, FILM COATED ORAL NIGHTLY PRN
COMMUNITY
End: 2020-06-05

## 2019-10-03 NOTE — PROGRESS NOTES
Date of Office Visit: 10/03/2019  Encounter Provider: Lucy Garcia MD  Place of Service: [facility] CARDIOLOGY  Patient Name: Deja Juárez  :1945      Patient ID:  Deja Juárez is a 74 y.o. female is here for preoperative evaluation, abnormal baseline ECG.          History of Present Illness     Deja Juárez is here today as a preoperative evaluation.  She had a left knee replacement performed by Dr. Vo in .  She is now to have a right knee replacement.      She has anxiety and depression, hyperlipidemia and a history of scoliosis.      She quit smoking 47 years ago.  She is  and lives with her .  She is retired.  She has two grandchildren.  She drinks one cup of coffee per day.  No alcohol or drugs.       Diabetes is in her family in her father as well as hypertension being present in her mother and father.       She had an echocardiogram done 2/10/2017 showing ejection fraction 69% with grade 2 diastolic dysfunction, mild mitral insufficiency.  He had nonischemic stress nuclear perfusion study done 2017.  She had an echocardiogram done 3/4/2019 showing ejection fraction 57% with grade 1 diastolic dysfunction, mild mitral insufficiency, thickened aortic valve.  She had a Holter monitor done 2017 showing PVCs and PACs, one nonsustained run of atrial tachycardia otherwise normal sinus rhythm.  There were no pauses or high degree AV block.  This work-up was done for palpitations.  She is placed on metoprolol and has had no further palpitations.    She denies exertional dyspnea, chest pain, tightness, pressure.  She does not feel her heart racing and has no dizziness or syncope.  Her energy levels been low but she thinks is due to the knee.  She has no orthopnea or PND.  She has no lower extremity edema.    She has no history of myocardial infarction, heart failure, stroke, blood clots or cancer.  She had no nausea, vomiting, blood in  the stool or black stools.  She denies fevers or chills.  She does not have diabetes or hypertension.  She has no history of kidney disease or respiratory failure.      She had laboratory values done 9/24/2019 showing normal BMP, normal CBC.  She had TSH done 2/14/2019 which was normal.  Last lipid panel I have is from 10/26/2018 showing , triglycerides 176, HDL 57.  She checks her blood pressure at home and it runs 118/70.    Past Medical History:   Diagnosis Date   • Anxiety    • Depression    • GERD (gastroesophageal reflux disease)    • Hyperlipidemia    • Hypertension    • Scoliosis          Past Surgical History:   Procedure Laterality Date   • EYE SURGERY Left 02/06/2019q   • LIPOMA EXCISION      from shoulder   • TOTAL KNEE ARTHROPLASTY Left 2017       Current Outpatient Medications on File Prior to Visit   Medication Sig Dispense Refill   • aspirin 81 MG EC tablet Take 81 mg by mouth Daily.     • Cholecalciferol (VITAMIN D) 1000 UNITS tablet Take 1,000 Units by mouth.     • diphenhydrAMINE-acetaminophen (TYLENOL PM)  MG tablet per tablet Take 1 tablet by mouth At Night As Needed for Sleep.     • famotidine (PEPCID) 20 MG tablet Take 20 mg by mouth Daily.     • melatonin 5 MG tablet tablet Take 5 mg by mouth At Night As Needed.     • metoprolol succinate XL (TOPROL-XL) 25 MG 24 hr tablet TAKE ONE TABLET BY MOUTH DAILY 30 tablet 5   • Multiple Vitamin (MULTIVITAMIN) tablet Take 1 tablet by mouth Daily.     • [DISCONTINUED] atorvastatin (LIPITOR) 40 MG tablet TAKE ONE TABLET BY MOUTH DAILY 90 tablet 0   • [DISCONTINUED] calcium carbonate-vitamin d (CALTRATE 600+D) 600-400 MG-UNIT per tablet Take 1 tablet by mouth.     • [DISCONTINUED] clotrimazole (LOTRIMIN) 1 % cream Apply  topically to the appropriate area as directed 2 (Two) Times a Day. 40 g 0     No current facility-administered medications on file prior to visit.        Social History     Socioeconomic History   • Marital status:       "Spouse name: Not on file   • Number of children: Not on file   • Years of education: Not on file   • Highest education level: Not on file   Tobacco Use   • Smoking status: Former Smoker     Packs/day: 1.00     Years: 5.00     Pack years: 5.00     Types: Cigarettes     Last attempt to quit: 1960     Years since quittin.7   • Smokeless tobacco: Never Used   • Tobacco comment: CAFFEINE USE   Substance and Sexual Activity   • Alcohol use: No   • Drug use: No           Review of Systems   Constitution: Positive for malaise/fatigue.   HENT: Negative for congestion.    Eyes: Negative for vision loss in left eye and vision loss in right eye.   Respiratory: Negative.  Negative for cough, hemoptysis, shortness of breath, sleep disturbances due to breathing, snoring, sputum production and wheezing.    Endocrine: Negative.    Hematologic/Lymphatic: Negative.    Skin: Negative for poor wound healing and rash.   Musculoskeletal: Positive for joint pain. Negative for falls, gout, muscle cramps and myalgias.   Gastrointestinal: Negative for abdominal pain, diarrhea, dysphagia, hematemesis, melena, nausea and vomiting.   Neurological: Negative for excessive daytime sleepiness, dizziness, headaches, light-headedness, loss of balance, seizures and vertigo.   Psychiatric/Behavioral: Negative for depression and substance abuse. The patient is not nervous/anxious.        Procedures    ECG 12 Lead  Date/Time: 10/3/2019 8:21 AM  Performed by: Lucy Garcia MD  Authorized by: Lucy Garcia MD   Comparison: compared with previous ECG   Similar to previous ECG  Rhythm: sinus rhythm  T inversion: I, aVL, V2, V3, V4, V5 and V6    Clinical impression: abnormal EKG                Objective:      Vitals:    10/03/19 0818   BP: 130/80   Pulse: 53   Weight: 72.3 kg (159 lb 6.4 oz)   Height: 165.1 cm (65\")     Body mass index is 26.53 kg/m².    Physical Exam   Constitutional: She is oriented to person, place, and time. She " appears well-developed and well-nourished. No distress.   HENT:   Head: Normocephalic and atraumatic.   Eyes: Conjunctivae are normal. No scleral icterus.   Neck: Neck supple. No JVD present. Carotid bruit is not present. No thyromegaly present.   Cardiovascular: Normal rate, regular rhythm, S1 normal, S2 normal, normal heart sounds and intact distal pulses.  No extrasystoles are present. PMI is not displaced. Exam reveals no gallop.   No murmur heard.  Pulses:       Carotid pulses are 2+ on the right side, and 2+ on the left side.       Radial pulses are 2+ on the right side, and 2+ on the left side.        Dorsalis pedis pulses are 2+ on the right side, and 2+ on the left side.        Posterior tibial pulses are 2+ on the right side, and 2+ on the left side.   Pulmonary/Chest: Effort normal and breath sounds normal. No respiratory distress. She has no wheezes. She has no rhonchi. She has no rales. She exhibits no tenderness.   Abdominal: Soft. Bowel sounds are normal. She exhibits no distension, no abdominal bruit and no mass. There is no tenderness.   Musculoskeletal: She exhibits no edema or deformity.   Lymphadenopathy:     She has no cervical adenopathy.   Neurological: She is alert and oriented to person, place, and time. No cranial nerve deficit.   Skin: Skin is warm and dry. No rash noted. She is not diaphoretic. No cyanosis. No pallor. Nails show no clubbing.   Psychiatric: She has a normal mood and affect. Judgment normal.   Vitals reviewed.      Lab Review:       Assessment:      Diagnosis Plan   1. Abnormal ECG     2. Preop cardiovascular exam     3. Hyperlipidemia, unspecified hyperlipidemia type        1.        Preoperative evaluation prior to right knee surgery.    For 10/20/2019.  2.         Abnormal EKG with anterior T-wave inversions.  stable.   3.          PACs and PVCs, treated with metoprolol.  She was having palpitations.  She does not have these anymore.  4.         Hyperlipidemia.  She  treats this with diet and has been well controlled.           Plan:       She is low CV risk for surgery and may proceed without further testing.  No medication changes made.  No follow-up required.

## 2019-10-16 ENCOUNTER — OFFICE VISIT (OUTPATIENT)
Dept: INTERNAL MEDICINE | Facility: CLINIC | Age: 74
End: 2019-10-16

## 2019-10-16 VITALS
HEIGHT: 65 IN | WEIGHT: 158 LBS | BODY MASS INDEX: 26.33 KG/M2 | SYSTOLIC BLOOD PRESSURE: 122 MMHG | DIASTOLIC BLOOD PRESSURE: 70 MMHG

## 2019-10-16 DIAGNOSIS — Z01.818 PREOPERATIVE CLEARANCE: Primary | ICD-10-CM

## 2019-10-16 PROCEDURE — 99213 OFFICE O/P EST LOW 20 MIN: CPT | Performed by: INTERNAL MEDICINE

## 2019-10-16 NOTE — PROGRESS NOTES
"Chief Complaint   Patient presents with   • Knee Pain     clearance for knee surgery right       Subjective   Deja Juárez is a 74 y.o. female.     Knee Pain       She has ongoing pain in her right knee.  She has arthritis in her knee.  She is scheduled for right knee replacement and needs preoperative clearance.    She has had preoperative testing, including ECG, CBC with differential, basic metabolic panel.  Her ECG is abnormal.  She had cardiology consultation October 3.  She has been cleared for surgery from the cardiology standpoint.    The following portions of the patient's history were reviewed and updated as appropriate: allergies, current medications, past family history, past medical history, past social history, past surgical history and problem list.    Review of Systems   Constitutional: Negative for appetite change.   Eyes: Negative for blurred vision and double vision.   Respiratory: Negative for cough and shortness of breath.    Cardiovascular: Negative for chest pain, palpitations and leg swelling.   Gastrointestinal: Negative for nausea and vomiting.   Neurological: Negative for dizziness and headache.   Hematological: Does not bruise/bleed easily.         Current Outpatient Medications:   •  Cholecalciferol (VITAMIN D) 1000 UNITS tablet, Take 1,000 Units by mouth., Disp: , Rfl:   •  diphenhydrAMINE-acetaminophen (TYLENOL PM)  MG tablet per tablet, Take 1 tablet by mouth At Night As Needed for Sleep., Disp: , Rfl:   •  famotidine (PEPCID) 20 MG tablet, Take 20 mg by mouth Daily., Disp: , Rfl:   •  melatonin 5 MG tablet tablet, Take 5 mg by mouth At Night As Needed., Disp: , Rfl:   •  metoprolol succinate XL (TOPROL-XL) 25 MG 24 hr tablet, TAKE ONE TABLET BY MOUTH DAILY, Disp: 30 tablet, Rfl: 5  •  Multiple Vitamin (MULTIVITAMIN) tablet, Take 1 tablet by mouth Daily., Disp: , Rfl:         Objective     /70   Ht 165.1 cm (65\")   Wt 71.7 kg (158 lb)   BMI 26.29 kg/m²     Physical " Exam   Constitutional: She is oriented to person, place, and time. She appears well-developed and well-nourished. No distress.   Neck: Normal carotid pulses present. Carotid bruit is not present.   Cardiovascular: Regular rhythm, S1 normal and S2 normal. Exam reveals no gallop and no friction rub.   No murmur heard.  Pulses:       Carotid pulses are 2+ on the right side, and 2+ on the left side.  Pulmonary/Chest: Effort normal and breath sounds normal. She has no wheezes. She has no rhonchi. She has no rales. Chest wall is not dull to percussion.   Musculoskeletal: She exhibits no edema.   Neurological: She is alert and oriented to person, place, and time. No cranial nerve deficit.   Skin: Skin is warm and dry.   Psychiatric: She has a normal mood and affect.   Nursing note and vitals reviewed.    Reviewed CBC and basic metabolic panel.    Assessment/Plan   Deja was seen today for knee pain.    Diagnoses and all orders for this visit:    Preoperative clearance  Comments:  She is medically clear for surgery.

## 2019-11-04 ENCOUNTER — OFFICE VISIT (OUTPATIENT)
Dept: INTERNAL MEDICINE | Facility: CLINIC | Age: 74
End: 2019-11-04

## 2019-11-04 VITALS
SYSTOLIC BLOOD PRESSURE: 132 MMHG | WEIGHT: 162 LBS | BODY MASS INDEX: 26.99 KG/M2 | OXYGEN SATURATION: 95 % | HEART RATE: 59 BPM | HEIGHT: 65 IN | DIASTOLIC BLOOD PRESSURE: 64 MMHG

## 2019-11-04 DIAGNOSIS — R60.0 SALIVARY GLAND SWELLING: Primary | ICD-10-CM

## 2019-11-04 PROCEDURE — 99213 OFFICE O/P EST LOW 20 MIN: CPT | Performed by: INTERNAL MEDICINE

## 2019-11-04 RX ORDER — AMOXICILLIN 500 MG/1
500 CAPSULE ORAL 3 TIMES DAILY
Qty: 30 CAPSULE | Refills: 0 | Status: SHIPPED | OUTPATIENT
Start: 2019-11-04 | End: 2019-12-24 | Stop reason: SDUPTHER

## 2019-11-04 RX ORDER — OXYCODONE HYDROCHLORIDE AND ACETAMINOPHEN 5; 325 MG/1; MG/1
1 TABLET ORAL EVERY 4 HOURS PRN
COMMUNITY
Start: 2019-10-23 | End: 2020-04-22

## 2019-11-04 NOTE — PROGRESS NOTES
Chief Complaint   Patient presents with   • Swollen Glands      left side of face has swollen gland   • Leg Swelling     right leg from surgery some swelling and redness       Subjective   Deja Juárez is a 74 y.o. female.     History of Present Illness     Her  noticed some swelling of her left jaw recently.  Over the weekend, she took amoxicillin.  She believes the dose is 500 mg.  She took it every 6 hours.  This was prescribed in the event she had to have dental work.  She has recently had right total knee replacement.  She has a little bit of swelling about the surgical site.    The following portions of the patient's history were reviewed and updated as appropriate: allergies, current medications, past family history, past medical history, past social history, past surgical history and problem list.    Review of Systems   Constitutional: Negative for appetite change and fever.   HENT: Positive for postnasal drip. Negative for ear pain and sore throat.    Respiratory: Negative for cough and shortness of breath.          Current Outpatient Medications:   •  Cholecalciferol (VITAMIN D) 1000 UNITS tablet, Take 1,000 Units by mouth., Disp: , Rfl:   •  diphenhydrAMINE-acetaminophen (TYLENOL PM)  MG tablet per tablet, Take 1 tablet by mouth At Night As Needed for Sleep., Disp: , Rfl:   •  famotidine (PEPCID) 20 MG tablet, Take 20 mg by mouth Daily., Disp: , Rfl:   •  melatonin 5 MG tablet tablet, Take 5 mg by mouth At Night As Needed., Disp: , Rfl:   •  metoprolol succinate XL (TOPROL-XL) 25 MG 24 hr tablet, TAKE ONE TABLET BY MOUTH DAILY, Disp: 30 tablet, Rfl: 5  •  Multiple Vitamin (MULTIVITAMIN) tablet, Take 1 tablet by mouth Daily., Disp: , Rfl:   •  oxyCODONE-acetaminophen (PERCOCET) 5-325 MG per tablet, Take 1 tablet by mouth Every 4 (Four) Hours As Needed., Disp: , Rfl:   •  amoxicillin (AMOXIL) 500 MG capsule, Take 1 capsule by mouth 3 (Three) Times a Day., Disp: 30 capsule, Rfl:  "0        Objective     /64 (BP Location: Left arm, Patient Position: Sitting, Cuff Size: Adult)   Pulse 59   Ht 165.1 cm (65\")   Wt 73.5 kg (162 lb)   SpO2 95%   BMI 26.96 kg/m²     Physical Exam   Constitutional: She appears well-developed and well-nourished. No distress.   HENT:   Right Ear: Tympanic membrane normal.   Left Ear: Tympanic membrane normal.   Nose: Right sinus exhibits no maxillary sinus tenderness and no frontal sinus tenderness. Left sinus exhibits no maxillary sinus tenderness and no frontal sinus tenderness.   Mouth/Throat: Oropharynx is clear and moist.   No swelling noted about her face.  When she looks in the mirror, she thinks her left face is swollen.  She indicates the area of the parotid gland.  There is no tenderness to that area.  Her left mandible area is slightly tender.  No overlying skin changes.   Neck: Neck supple.   Cardiovascular: Normal rate and regular rhythm.   Pulmonary/Chest: Effort normal and breath sounds normal.   Lymphadenopathy:     She has no cervical adenopathy.   Nursing note and vitals reviewed.        Assessment/Plan   Deja was seen today for swollen glands and leg swelling.    Diagnoses and all orders for this visit:    Salivary gland swelling    Other orders  -     amoxicillin (AMOXIL) 500 MG capsule; Take 1 capsule by mouth 3 (Three) Times a Day.    Discussed.  She may be noticing left parotid gland enlargement.  She has not noticed any changes when she eats.  It seems of gotten better with taking amoxicillin.  Prescription given for her to continue this.  Advised her to complete at least 10 days, counting what she is already taken.           "

## 2019-11-27 ENCOUNTER — CLINICAL SUPPORT (OUTPATIENT)
Dept: INTERNAL MEDICINE | Facility: CLINIC | Age: 74
End: 2019-11-27

## 2019-11-27 DIAGNOSIS — Z23 NEED FOR IMMUNIZATION AGAINST INFLUENZA: Primary | ICD-10-CM

## 2019-11-27 PROCEDURE — 90653 IIV ADJUVANT VACCINE IM: CPT | Performed by: INTERNAL MEDICINE

## 2019-11-27 PROCEDURE — G0008 ADMIN INFLUENZA VIRUS VAC: HCPCS | Performed by: INTERNAL MEDICINE

## 2019-12-24 ENCOUNTER — OFFICE VISIT (OUTPATIENT)
Dept: INTERNAL MEDICINE | Facility: CLINIC | Age: 74
End: 2019-12-24

## 2019-12-24 VITALS
HEIGHT: 65 IN | HEART RATE: 81 BPM | DIASTOLIC BLOOD PRESSURE: 80 MMHG | OXYGEN SATURATION: 95 % | WEIGHT: 162 LBS | BODY MASS INDEX: 26.99 KG/M2 | SYSTOLIC BLOOD PRESSURE: 132 MMHG

## 2019-12-24 DIAGNOSIS — Z98.818 OTHER DENTAL PROCEDURE STATUS: ICD-10-CM

## 2019-12-24 DIAGNOSIS — L08.9 TOE INFECTION: Primary | ICD-10-CM

## 2019-12-24 PROCEDURE — 99213 OFFICE O/P EST LOW 20 MIN: CPT | Performed by: NURSE PRACTITIONER

## 2019-12-24 RX ORDER — AMOXICILLIN 500 MG/1
500 CAPSULE ORAL 3 TIMES DAILY
Qty: 10 CAPSULE | Refills: 0 | Status: SHIPPED | OUTPATIENT
Start: 2019-12-24 | End: 2020-01-09

## 2019-12-24 RX ORDER — CEPHALEXIN 500 MG/1
500 CAPSULE ORAL 2 TIMES DAILY
Qty: 10 CAPSULE | Refills: 0 | Status: SHIPPED | OUTPATIENT
Start: 2019-12-24 | End: 2019-12-29

## 2019-12-24 NOTE — PROGRESS NOTES
"Subjective     Deja Juárez is a 74 y.o. female.         Patient presents with:  Toe Pain:  second toe on left foot painful and red x 1 wk.    She has taken 5 doses of amoxicillin that she has on hand for dental procedures since she has h/o of b/l knee replacements.    Toe Pain    There was no injury mechanism. The quality of the pain is described as aching. The pain is mild. The pain has been improving since onset. Pertinent negatives include no inability to bear weight, loss of motion, loss of sensation, muscle weakness, numbness or tingling. The symptoms are aggravated by movement. Treatments tried: epsom salt, amoxil. The treatment provided mild relief.        The following portions of the patient's history were reviewed and updated as appropriate: allergies, current medications, past social history and problem list.    Review of Systems   Constitutional: Negative for fever.   Skin: Positive for color change. Negative for rash and wound.   Neurological: Negative for tingling, weakness and numbness.       Objective     /80   Pulse 81   Ht 165.1 cm (65\")   Wt 73.5 kg (162 lb)   SpO2 95%   BMI 26.96 kg/m²     Physical Exam   Constitutional: She appears well-developed and well-nourished.   HENT:   Head: Normocephalic and atraumatic.   Pulmonary/Chest: Effort normal. No respiratory distress.   Musculoskeletal: Normal range of motion.   Neurological: She is alert.   Skin: Skin is warm and dry.   Erythema and edema around the nail of L second toe. No drainage noted   Psychiatric: She has a normal mood and affect. Her behavior is normal. Judgment and thought content normal.   Vitals reviewed.      Assessment/Plan     Deja was seen today for toe pain.    Diagnoses and all orders for this visit:    Toe infection  -     cephalexin (KEFLEX) 500 MG capsule; Take 1 capsule by mouth 2 (Two) Times a Day for 5 days.    Other dental procedure status  -     amoxicillin (AMOXIL) 500 MG capsule; Take 1 capsule by " mouth 3 (Three) Times a Day.    Amoxil ordered to replace the ones she took for her toe infection for future dental procedures.    She can continue epsom salt soaks.    Return for worsening of sx.

## 2020-01-09 ENCOUNTER — OFFICE VISIT (OUTPATIENT)
Dept: INTERNAL MEDICINE | Facility: CLINIC | Age: 75
End: 2020-01-09

## 2020-01-09 VITALS
BODY MASS INDEX: 27.46 KG/M2 | OXYGEN SATURATION: 98 % | SYSTOLIC BLOOD PRESSURE: 132 MMHG | WEIGHT: 165 LBS | TEMPERATURE: 98.1 F | DIASTOLIC BLOOD PRESSURE: 80 MMHG | HEART RATE: 54 BPM

## 2020-01-09 DIAGNOSIS — J06.9 UPPER RESPIRATORY TRACT INFECTION, UNSPECIFIED TYPE: Primary | ICD-10-CM

## 2020-01-09 PROCEDURE — 99213 OFFICE O/P EST LOW 20 MIN: CPT | Performed by: NURSE PRACTITIONER

## 2020-01-09 RX ORDER — METHYLPREDNISOLONE 4 MG/1
TABLET ORAL
Qty: 21 TABLET | Refills: 0 | OUTPATIENT
Start: 2020-01-09 | End: 2020-04-22

## 2020-01-09 NOTE — PROGRESS NOTES
Subjective     Deja Juárez is a 74 y.o. female.         Patient presents with:  URI        URI    This is a new problem. The current episode started in the past 7 days. The problem has been gradually worsening. There has been no fever. Associated symptoms include congestion, coughing, rhinorrhea and sneezing. Pertinent negatives include no chest pain, ear pain, plugged ear sensation, sinus pain or sore throat. Treatments tried: theraflu, mucinex, dayquil, nyquil. The treatment provided mild relief.        The following portions of the patient's history were reviewed and updated as appropriate: allergies, current medications, past social history and problem list.    Review of Systems   Constitutional: Positive for fatigue and fever.   HENT: Positive for congestion, postnasal drip, rhinorrhea and sneezing. Negative for ear pain, sinus pressure, sinus pain and sore throat.    Respiratory: Positive for cough. Negative for shortness of breath.    Cardiovascular: Negative for chest pain.       Objective     /80   Pulse 54   Temp 98.1 °F (36.7 °C)   Wt 74.8 kg (165 lb)   SpO2 98%   BMI 27.46 kg/m²     Physical Exam   Constitutional: She appears well-developed and well-nourished. No distress.   HENT:   Head: Normocephalic and atraumatic.   Right Ear: Hearing and tympanic membrane normal.   Left Ear: Hearing and tympanic membrane normal.   Nose: Mucosal edema and rhinorrhea present. Right sinus exhibits no maxillary sinus tenderness and no frontal sinus tenderness. Left sinus exhibits no maxillary sinus tenderness and no frontal sinus tenderness.   Mouth/Throat: Uvula is midline and mucous membranes are normal. No oropharyngeal exudate or posterior oropharyngeal erythema (clear hypersecretions noted). No tonsillar exudate.   Eyes: Conjunctivae are normal. Right eye exhibits no discharge. Left eye exhibits no discharge.   Cardiovascular: Normal rate, regular rhythm and normal heart sounds.   No murmur  heard.  Pulmonary/Chest: Effort normal and breath sounds normal. No tachypnea. She has no wheezes.   Lymphadenopathy:        Head (right side): No submental, no submandibular and no tonsillar adenopathy present.        Head (left side): No submental, no submandibular and no tonsillar adenopathy present.   Neurological: She is alert.   Skin: She is not diaphoretic.   Psychiatric: She has a normal mood and affect.   Vitals reviewed.      Assessment/Plan     Deja was seen today for uri.    Diagnoses and all orders for this visit:    Upper respiratory tract infection, unspecified type  -     methylPREDNISolone (MEDROL, ELVIRA,) 4 MG tablet; Take as directed on package instructions.    Take Flonase and Allegra daily.    Continue mucinex.    Increase fluid intake and rest.    Return for worsening of sx.

## 2020-01-13 ENCOUNTER — TELEPHONE (OUTPATIENT)
Dept: INTERNAL MEDICINE | Facility: CLINIC | Age: 75
End: 2020-01-13

## 2020-01-13 NOTE — TELEPHONE ENCOUNTER
Pt saw Shari on the 1/9/20.  She is gone for the day, but I can hold for her if you would rather do that.

## 2020-01-13 NOTE — TELEPHONE ENCOUNTER
Pt called and stated that the medication given at last appt is not working and would like an antibiotic and cough medicine be called into [facility] [place]    Pt call back  [phone]

## 2020-01-14 DIAGNOSIS — J06.9 UPPER RESPIRATORY TRACT INFECTION, UNSPECIFIED TYPE: Primary | ICD-10-CM

## 2020-01-14 RX ORDER — BENZONATATE 200 MG/1
200 CAPSULE ORAL 3 TIMES DAILY PRN
Qty: 30 CAPSULE | Refills: 0 | OUTPATIENT
Start: 2020-01-14 | End: 2020-04-22

## 2020-01-14 RX ORDER — AZITHROMYCIN 250 MG/1
TABLET, FILM COATED ORAL
Qty: 6 TABLET | Refills: 0 | OUTPATIENT
Start: 2020-01-14 | End: 2020-04-22

## 2020-01-14 NOTE — TELEPHONE ENCOUNTER
"Pt calling back this AM. Rx still not called in.  She is not feeling any better.  She is on her last dose of Prednisone Her cough and congestion is \"still so bad\".  Please advise  "

## 2020-01-21 ENCOUNTER — TELEPHONE (OUTPATIENT)
Dept: INTERNAL MEDICINE | Facility: CLINIC | Age: 75
End: 2020-01-21

## 2020-01-21 NOTE — TELEPHONE ENCOUNTER
Patient called in trying to schedule a mammogram. I tried to warm transfer twice with no anwer. Please contact pt for scheduling.    Pt. Call back [phone]

## 2020-02-16 DIAGNOSIS — R00.2 PALPITATIONS: ICD-10-CM

## 2020-02-16 DIAGNOSIS — I49.3 PVC (PREMATURE VENTRICULAR CONTRACTION): ICD-10-CM

## 2020-02-17 RX ORDER — METOPROLOL SUCCINATE 25 MG/1
TABLET, EXTENDED RELEASE ORAL
Qty: 30 TABLET | Refills: 4 | Status: SHIPPED | OUTPATIENT
Start: 2020-02-17 | End: 2020-09-07

## 2020-03-02 DIAGNOSIS — Z12.31 ENCOUNTER FOR SCREENING MAMMOGRAM FOR BREAST CANCER: Primary | ICD-10-CM

## 2020-03-03 ENCOUNTER — APPOINTMENT (OUTPATIENT)
Dept: WOMENS IMAGING | Facility: HOSPITAL | Age: 75
End: 2020-03-03

## 2020-03-03 ENCOUNTER — OFFICE VISIT (OUTPATIENT)
Dept: INTERNAL MEDICINE | Facility: CLINIC | Age: 75
End: 2020-03-03

## 2020-03-03 DIAGNOSIS — Z12.31 ENCOUNTER FOR SCREENING MAMMOGRAM FOR BREAST CANCER: ICD-10-CM

## 2020-03-03 PROCEDURE — 77067 SCR MAMMO BI INCL CAD: CPT | Performed by: RADIOLOGY

## 2020-03-03 PROCEDURE — 77067 SCR MAMMO BI INCL CAD: CPT | Performed by: INTERNAL MEDICINE

## 2020-04-22 ENCOUNTER — APPOINTMENT (OUTPATIENT)
Dept: GENERAL RADIOLOGY | Facility: HOSPITAL | Age: 75
End: 2020-04-22

## 2020-04-22 ENCOUNTER — HOSPITAL ENCOUNTER (EMERGENCY)
Facility: HOSPITAL | Age: 75
Discharge: HOME | End: 2020-04-22
Attending: EMERGENCY MEDICINE | Admitting: EMERGENCY MEDICINE

## 2020-04-22 VITALS
OXYGEN SATURATION: 94 % | BODY MASS INDEX: 27.49 KG/M2 | TEMPERATURE: 99.6 F | HEART RATE: 84 BPM | HEIGHT: 65 IN | RESPIRATION RATE: 16 BRPM | SYSTOLIC BLOOD PRESSURE: 117 MMHG | WEIGHT: 165 LBS | DIASTOLIC BLOOD PRESSURE: 58 MMHG
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
## 2020-04-22 DIAGNOSIS — S32.591A CLOSED FRACTURE OF RAMUS OF RIGHT PUBIS, INITIAL ENCOUNTER (HCC): Primary | ICD-10-CM
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
## 2020-04-22 DIAGNOSIS — T07.XXXA ABRASIONS OF MULTIPLE SITES: ICD-10-CM

## 2020-04-22 PROCEDURE — 99283 EMERGENCY DEPT VISIT LOW MDM: CPT

## 2020-04-22 PROCEDURE — 25010000002 MORPHINE PER 10 MG: Performed by: NURSE PRACTITIONER

## 2020-04-22 PROCEDURE — 25010000002 TDAP 5-2.5-18.5 LF-MCG/0.5 SUSPENSION: Performed by: NURSE PRACTITIONER

## 2020-04-22 PROCEDURE — 90471 IMMUNIZATION ADMIN: CPT | Performed by: NURSE PRACTITIONER

## 2020-04-22 PROCEDURE — 90715 TDAP VACCINE 7 YRS/> IM: CPT | Performed by: NURSE PRACTITIONER

## 2020-04-22 PROCEDURE — 96372 THER/PROPH/DIAG INJ SC/IM: CPT

## 2020-04-22 PROCEDURE — 73523 X-RAY EXAM HIPS BI 5/> VIEWS: CPT

## 2020-04-22 RX ORDER — MORPHINE SULFATE 2 MG/ML
4 INJECTION, SOLUTION INTRAMUSCULAR; INTRAVENOUS ONCE
Status: COMPLETED | OUTPATIENT
Start: 2020-04-22 | End: 2020-04-22

## 2020-04-22 RX ORDER — HYDROCODONE BITARTRATE AND ACETAMINOPHEN 5; 325 MG/1; MG/1
1 TABLET ORAL EVERY 6 HOURS PRN
Qty: 12 TABLET | Refills: 0 | Status: SHIPPED | OUTPATIENT
Start: 2020-04-22 | End: 2020-04-24 | Stop reason: SDUPTHER

## 2020-04-22 RX ADMIN — TETANUS TOXOID, REDUCED DIPHTHERIA TOXOID AND ACELLULAR PERTUSSIS VACCINE, ADSORBED 0.5 ML: 5; 2.5; 8; 8; 2.5 SUSPENSION INTRAMUSCULAR at 14:58

## 2020-04-22 RX ADMIN — MORPHINE SULFATE 4 MG: 2 INJECTION, SOLUTION INTRAMUSCULAR; INTRAVENOUS at 15:39

## 2020-04-22 NOTE — PROGRESS NOTES
Discharge Planning Assessment  [facility]     Patient Name: Deja Juárez  MRN: [id number]  Today's Date: 4/22/2020    Admit Date: 4/22/2020    Discharge Needs Assessment     Row Name 04/22/20 1717       Living Environment    Lives With  spouse    Name(s) of Who Lives With Patient  Sandro Juárez    Current Living Arrangements  home/apartment/condo    Potentially Unsafe Housing Conditions  -- patient lives in a trilevel- has several steps to get in but patient thinks she can do it and has other family assistance as well    Primary Care Provided by  self    Provides Primary Care For  no one    Caregiving Concerns  due to difficulty ambulating, safety concerns for d/c home and ADL's    Family Caregiver if Needed  spouse;child(rosendo), adult    Family Caregiver Names  Benjamiin    Quality of Family Relationships  supportive;helpful;involved    Able to Return to Prior Arrangements  yes    Living Arrangement Comments  Patient requests to be d/c home w/spouse at this time       Resource/Environmental Concerns    Resource/Environmental Concerns  none    Transportation Concerns  car, none       Transition Planning    Patient/Family Anticipates Transition to  home with family;home with help/services    Patient/Family Anticipated Services at Transition  ;home health care    Transportation Anticipated  family or friend will provide       Discharge Needs Assessment    Concerns to be Addressed  discharge planning;home safety    Equipment Currently Used at Home  commode raised commode- patient additionally has a walker    Anticipated Changes Related to Illness  inability to care for self    Equipment Needed After Discharge  walker, standard;shower chair patient requests information on shower chair    Outpatient/Agency/Support Group Needs  homecare agency    Provided Post Acute Provider List?  Yes    Post Acute Provider List  Inpatient Rehab;Home Health    Delivered To  Patient    Method of Delivery  In person     Patient's Choice of Community Agency(s)  [facility]    Current Discharge Risk  physical impairment        Discharge Plan     Row Name 04/22/20 7523       Plan    Plan  Patient will be d/c home    Provided Post Acute Provider Quality & Resource List?  N/A    Patient/Family in Agreement with Plan  yes    Plan Comments  Entered room, introduced self and explained role w/mask in place. Patient has been evaluated in the ED today s/p fall and has sustained a pubic ramus fracture. Patient usually lives in a house w/spouse, is independent w/ADL's and very active. Since injury, patient has had difficulty w/ambulation, but can take some steps with walker. Patient currently has a walker and a raised commode at home and is interested in a shower chair. Advised a shower chair may be out of pocket pay but I can check and call her at home to notify as patient would like to fo home. Provided RTR to patient and discussed with her and family upon d/c if interested in home health. Patient agreed to [facility] as she has used them previously. Patient escorted to private vehicle via w/c and assisted into car. Called [facility], spoke to Beth who advised a shower chair isnt covered by insurance- a stool starts at $41.95, and a seat $44.95-$80.00. Called [facility] to notify of new referral.     Final Discharge Disposition Code  01 - home or self-care;06 - home with home health care    Final Note  Patient d/c home via private vehicle w/family and home health to follow.         Destination      Coordination has not been started for this encounter.      Durable Medical Equipment      Coordination has not been started for this encounter.      Dialysis/Infusion      Coordination has not been started for this encounter.      Home Medical Care      Service Provider Request Status Selected Services Address Phone Number Fax Number    [facility] Pending - Request Sent N/A [phone]  [facility] [address] [phone]       Vicky Singh RN 4/22/2020 1743    Called in new referral. Vicky Singh RN                   Therapy      Coordination has not been started for this encounter.      Community Resources      Coordination has not been started for this encounter.          Demographic Summary     Row Name 04/22/20 1702       General Information    Admission Type  other (see comments) ED patient    Arrived From  home    Reason for Consult  discharge planning    Preferred Language  English     Used During This Interaction  no       Contact Information    Permission Granted to Share Info With      Contact Information Obtained for      Contact Information Comments  verified information on facesheet           Name,   Vicky Tylerado    Phone,   [phone]        Functional Status     Row Name 04/22/20 1716       Functional Status    Usual Activity Tolerance  excellent    Current Activity Tolerance  poor    Functional Status Comments  patient is usually independent w/ADL's; since fall today and pubic ramus fx sustained, patient has pain w/ambulation- using a walker       Functional Status, IADL    Medications  independent    Meal Preparation  independent    Housekeeping  independent    Laundry  independent    Shopping  independent    IADL Comments  see above       Mental Status    General Appearance WDL  WDL       Mental Status Summary    Recent Changes in Mental Status/Cognitive Functioning  no changes        Psychosocial    No documentation.       Abuse/Neglect    No documentation.       Legal    No documentation.       Substance Abuse    No documentation.       Patient Forms    No documentation.           Vicky Singh RN

## 2020-04-22 NOTE — ED PROVIDER NOTES
EMERGENCY DEPARTMENT ENCOUNTER    Room Number:  02/02  Date of encounter:  4/22/2020  PCP: Vicky Smiley MD  Historian: Patient      HPI:  Chief Complaint: Pelvic pain status post fall  A complete HPI/ROS/PMH/PSH/SH/FH are unobtainable due to: Nothing    Context: Deja Juárez is a 75 y.o. female who presents to the ED c/o intermittent, achy pelvic pain secondary to a fall prior to arrival today.  Patient also complains of an abrasion to her right elbow and right knee.  Patient states she was walking down her driveway today to go do some yard work and just lost her balance and skid down her driveway.  Patient denies chest pain, dizziness, shortness of breath, weakness or any other symptoms prior to her fall.  Patient states that she was not paying attention, lost her balance and fell.  Patient states she was able to get up with the help of her neighbors has attempted to bear weight but the pain worsens with that.  Patient denies hitting her head.  Patient is unsure when her last tetanus shot was.      PAST MEDICAL HISTORY  Active Ambulatory Problems     Diagnosis Date Noted   • Hyperlipidemia    • Primary localized osteoarthritis of left knee 07/08/2015   • Preop cardiovascular exam 02/08/2017   • Abnormal ECG 02/08/2017   • Knee pain 07/08/2015   • GERD (gastroesophageal reflux disease)      Resolved Ambulatory Problems     Diagnosis Date Noted   • Elevated blood pressure      Past Medical History:   Diagnosis Date   • Anxiety    • Depression    • Hypertension    • Scoliosis          PAST SURGICAL HISTORY  Past Surgical History:   Procedure Laterality Date   • EYE SURGERY Left 02/06/2019q   • LIPOMA EXCISION      from shoulder   • TOTAL KNEE ARTHROPLASTY Left 2017         FAMILY HISTORY  Family History   Problem Relation Age of Onset   • Hypertension Mother    • Diabetes Father    • Hypertension Father          SOCIAL HISTORY  Social History     Socioeconomic History   • Marital status:      Spouse name:  Not on file   • Number of children: Not on file   • Years of education: Not on file   • Highest education level: Not on file   Tobacco Use   • Smoking status: Former Smoker     Packs/day: 1.00     Years: 5.00     Pack years: 5.00     Types: Cigarettes     Last attempt to quit: 1960     Years since quittin.3   • Smokeless tobacco: Never Used   • Tobacco comment: CAFFEINE USE   Substance and Sexual Activity   • Alcohol use: No   • Drug use: No         ALLERGIES  Patient has no known allergies.        REVIEW OF SYSTEMS  Review of Systems     All systems reviewed and negative except for those discussed in HPI.       PHYSICAL EXAM    I have reviewed the triage vital signs and nursing notes.    ED Triage Vitals   Temp Heart Rate Resp BP SpO2   20 1350 20 1350 20 1350 20 1359 20 1350   99.6 °F (37.6 °C) 95 18 (!) 152/104 94 %       Physical Exam  GENERAL: Well-appearing, not distressed  HENT: nares patent  EYES: no scleral icterus  CV: regular rhythm, regular rate  2+ DP and PT pulses noted bilaterally  RESPIRATORY: normal effort  ABDOMEN: soft  MUSCULOSKELETAL: Patient has no C, T, L spine tenderness.  Patient has normal range of motion of her bilateral upper extremities.  Patient has no bilateral hip tenderness.  Patient is able to raise both legs off the stretcher and has normal range of motion of bilateral lower extremities  NEURO: alert, moves all extremities, follows commands  SKIN: warm, dry, abrasion noted to her right elbow and abrasion noted to the medial aspect of her right knee.        RADIOLOGY  XR Hips Bilateral With or Without Pelvis 5 View   Final Result       As described.       This report was finalized on 2020 3:19 PM by Dr. Landon Fitzgerald M.D.              I ordered the above noted radiological studies and reviewed the images on the PACS system.        PROCEDURES    Procedures        PROGRESS, DATA ANALYSIS, CONSULTS, AND MEDICAL DECISION MAKING    PPE:  Patient was placed in face mask in first look. Patient was wearing facemask when I entered the room and throughout our encounter. I wore full protective equipment throughout this patient encounter including a face mask, eye protection and gloves. Hand hygiene was performed before donning protective equipment and after removal when leaving the room.      1530: Reviewed pt's history and workup with Dr. Lisa.  At bedside evaluation, they agree with the plan of care.  1600: After receiving pain medication patient was able to ambulate using a walker.  Patient states that she has a elevated commode seat from when she had knee replacement surgery.  Vicky Naidu, [facility] RN, is coming to assess patient to see if there are any further needs she needs at home.  Patient advised to follow-up with her PMD as needed.  strict return to ER precautions given.     Reviewed implications of results, diagnosis, meds, responsibility to follow up, warning signs and symptoms of possible worsening, potential complications and reasons to return to ER with patient.  Discussed all results and noted any abnormalities with patient.  Discussed absolute need to recheck abnormalities with PMD    Discussed plan for discharge, as there is no emergent indication for admission.  Pt is agreeable and understands need for follow up and repeat testing.  Pt is aware that discharge does not mean that nothing is wrong but it indicates no emergency is present.  Pt is discharged with instructions to follow up with primary care doctor to have their blood pressure rechecked.       DIAGNOSIS  Final diagnoses:   Closed fracture of ramus of right pubis, initial encounter (CMS/[facility])   Abrasions of multiple sites   Fall in home, initial encounter       FOLLOW UP   Vicky Smiley MD  [address] [address]  [phone]    In 1 day        RX     Medication List      New Prescriptions    HYDROcodone-acetaminophen 5-325 MG per tablet  Commonly known as:   NORCO  Take 1 tablet by mouth Every 6 (Six) Hours As Needed for Moderate Pain .          [place] report [id number]  reviewed.  Risks, benefits, alternatives discussed with patient.  Pt consents to treatment and agrees to follow up with PMD tomorrow for further care and any other prescriptions.           COURSE & MEDICAL DECISION MAKING  Any/All labs and Any/All Imaging studies that were ordered were reviewed and are noted above.  Results were reviewed/discussed with the patient and they were also made aware of online assess.   Pt also made aware that some labs, such as cultures, will not be resulted during ER visit and follow up with PMD is necessary.        Natalia Cedeño, APRN  04/22/20 5591

## 2020-04-22 NOTE — DISCHARGE PLACEMENT REQUEST
"Deja Dean (75 y.o. Female)     Date of Birth Social Security Number Address Home Phone MRN    1945  [address] [id number] [phone] [id number]    [employer] Marital Status          Unknown        Admission Date Admission Type Admitting Provider Attending Provider Department, Room/Bed    4/22/20 Emergency   [facility], 02/02    Discharge Date Discharge Disposition Discharge Destination        4/22/2020 Home or Self Care Home             Attending Provider:  (none)   Allergies:  No Known Allergies    Isolation:  None   Infection:  None   Code Status:  Not on file    Ht:  165.1 cm (65\")   Wt:  74.8 kg (165 lb)    Admission Cmt:  None   Principal Problem:  None                Active Insurance as of 4/22/2020     Primary Coverage     Payor Plan Insurance Group Employer/Plan Group    MEDICARE MEDICARE A & B      Payor Plan Address Payor Plan Phone Number Payor Plan Fax Number Effective Dates    [address] [phone]  1/1/2011 - None Entered    [facility] [id number]       Subscriber Name Subscriber Birth Date Member ID       DEJA DEAN 1945 [id number]           Secondary Coverage     Payor Plan Insurance Group Employer/Plan Group    ANTHEM BLUE CROSS [facility] SUPP [id number]     Payor Plan Address Payor Plan Phone Number Payor Plan Fax Number Effective Dates    [address]   12/1/2016 - None Entered    [facility] [id number]       Subscriber Name Subscriber Birth Date Member ID       DEJA DEAN 1945 [id number]                 Emergency Contacts      (Rel.) Home Phone Work Phone Mobile Phone    Sandro Dean (Spouse) -- -- [phone]              "

## 2020-04-22 NOTE — ED NOTES
"Mask placed on patient in triage.  Triage RN wearing mask throughout encounter.  Pt tripped and fell in her driveway. Complains of R arm pain, \"pain everywhere from waste down\".  Denies hitting head.   Denies blood thinners     Curt Palacios, RN  04/22/20 0574    "

## 2020-04-22 NOTE — DISCHARGE INSTRUCTIONS
Medications as ordered  May take ibuprofen, and reserve narcotic pain medicine for at night or severe pain  Use walker when ambulating  Clean abrasions daily with soap and water and apply antibiotic ointment  Follow-up with your PMD  Return to the ER for increased pain, inability to ambulate or get around or any new or worsening symptoms

## 2020-04-22 NOTE — ED PROVIDER NOTES
Pt presents to the ED c/o  bilateral groin pain, right greater than left after falling in her driveway today.  Patient states she was walking down her driveway when she slipped and fell and bounced several times on her driveway.  She did not hit her head and there was no loss of consciousness.  She states that she scraped her right forearm and her right knee in the process.  Her tetanus was not up-to-date.  She is had trouble ambulating since the fall and is here for further evaluation.  Of note, she denies any cough, fever or shortness of breath.     On exam,   Her heart is regular rate and rhythm without any murmurs.  Her lungs are clear to auscultation bilaterally.  Her C-spine is nontender to palpation.  Her T and L-spine's are nontender to palpation.  Right hip is mildly tender to palpation that is worsened with flexion and external rotation of her hip.  She has small abrasions on the lateral aspect of her right knee and on her proximal right forearm.  Both are nontender to palpation with full range of motion of her right elbow and right knee.     Plan: I agree with plan of x-rays and pain control.     Attestation:  The KALYAN and I have discussed this patient's history, physical exam, and treatment plan.  I have reviewed the documentation and personally had a face to face interaction with the patient. I affirm the documentation and agree with the treatment and plan.  The attached note describes my personal findings.     Dragon disclaimer:   Much of this encounter note is an electronic transcription/translation of spoken language to printed text.  The electronic translation of spoken language may permit erroneous, or at times, nonsensical words or phrases to be inadvertently transcribed.  Although I have reviewed the note for such areas, some may still exist.     Phill Lisa MD  04/22/20 7540

## 2020-04-23 ENCOUNTER — TELEPHONE (OUTPATIENT)
Dept: INTERNAL MEDICINE | Facility: CLINIC | Age: 75
End: 2020-04-23

## 2020-04-23 NOTE — TELEPHONE ENCOUNTER
Spoke with [facility] and gave the Verbal Okay for Home Health.     Pt fell yesterday and broke her pelvic bone.    Meka from [facility] needed Verbal orders for PT.    Verbal orders given.

## 2020-04-24 DIAGNOSIS — S32.591A CLOSED FRACTURE OF RAMUS OF RIGHT PUBIS, INITIAL ENCOUNTER (HCC): ICD-10-CM

## 2020-04-24 RX ORDER — HYDROCODONE BITARTRATE AND ACETAMINOPHEN 5; 325 MG/1; MG/1
1 TABLET ORAL EVERY 6 HOURS PRN
Qty: 30 TABLET | Refills: 0 | Status: SHIPPED | OUTPATIENT
Start: 2020-04-24 | End: 2020-05-01 | Stop reason: SDUPTHER

## 2020-04-24 NOTE — TELEPHONE ENCOUNTER
Patient called stating she fell on Wednesday and fractured her pelvis.  Patient states she is hurting too bad to come in this week.  She states her back is also hurting and would like an XR of back when she comes in next week.      Patient is also going to run out of medication in 2 days and will need a new RX.   HYDROcodone-acetaminophen (NORCO) 5-325 MG per tablet    [facility] [address] confirmed     Patient call back [phone]

## 2020-04-28 ENCOUNTER — TELEPHONE (OUTPATIENT)
Dept: INTERNAL MEDICINE | Facility: CLINIC | Age: 75
End: 2020-04-28

## 2020-04-28 RX ORDER — TIZANIDINE 4 MG/1
4 TABLET ORAL EVERY 6 HOURS PRN
Qty: 60 TABLET | Refills: 1 | Status: SHIPPED | OUTPATIENT
Start: 2020-04-28 | End: 2020-06-05

## 2020-04-28 NOTE — TELEPHONE ENCOUNTER
I sent in a prescription for tizanidine.  I hope this helps her muscles.    Okay to do a phone visit on Friday.

## 2020-04-28 NOTE — TELEPHONE ENCOUNTER
PATIENT CALLED IN REGARDS TO MED REQUEST FOR SOME MUSCLE RELAXER. HER MUSCLES ARE HURTING FROM HER PELVIC BREAK. SHE HURTS FROM HIPS TO HER KNEES.   SHE HAD PT YESTERDAY      [facility] - [place], [place] - [address] AT [facility] & [address] - [phone]  - [phone]  [phone]    PATIENT CALL BACK NUMBER [phone]

## 2020-04-28 NOTE — TELEPHONE ENCOUNTER
Also, pt has appt scheduled for Friday, but she is in a lot of pain and states it would take 2 people to get her in here.  So she wanted to see if she can do phone visit?  Or she said she could do facetime if you want, but she does not use [employer].

## 2020-04-30 ENCOUNTER — TELEPHONE (OUTPATIENT)
Dept: INTERNAL MEDICINE | Facility: CLINIC | Age: 75
End: 2020-04-30

## 2020-05-01 ENCOUNTER — OFFICE VISIT (OUTPATIENT)
Dept: INTERNAL MEDICINE | Facility: CLINIC | Age: 75
End: 2020-05-01

## 2020-05-01 DIAGNOSIS — S32.591A CLOSED FRACTURE OF RAMUS OF RIGHT PUBIS, INITIAL ENCOUNTER (HCC): Primary | ICD-10-CM

## 2020-05-01 PROCEDURE — 99442 PR PHYS/QHP TELEPHONE EVALUATION 11-20 MIN: CPT | Performed by: INTERNAL MEDICINE

## 2020-05-01 RX ORDER — HYDROCODONE BITARTRATE AND ACETAMINOPHEN 5; 325 MG/1; MG/1
1 TABLET ORAL EVERY 6 HOURS PRN
Qty: 60 TABLET | Refills: 0 | Status: SHIPPED | OUTPATIENT
Start: 2020-05-01 | End: 2020-07-23

## 2020-05-01 NOTE — PROGRESS NOTES
No chief complaint on file.      Fahad Juárez is a 75 y.o. female.     You have chosen to receive care through a telephone visit. Do you consent to use a telephone visit for your medical care today? Yes      Telephone visit done today to follow-up on pelvic fracture.  She fell April 22.  She was evaluated in the emergency room.  X-rays demonstrated nondisplaced fracture of the right pubic ramus.  She continues to have a significant amount of pain.  She needs help getting up out of bed.  It hurts to turn from side to side during the night.  If she seated or lying down, she feels okay but any movement hurts.  She is using a walker when she walks.  She is trying to get up and move around during the day.  She takes Norco 5-3 25 for pain control.  She has been taking that once every 6 hours.  She finds that it helps.  She knows that it can cause constipation and she is taking a stool softener.  She is also taking a muscle relaxer.      The following portions of the patient's history were reviewed and updated as appropriate: allergies, current medications, past family history, past medical history, past social history, past surgical history and problem list.    Review of Systems   Respiratory: Negative for shortness of breath.    Cardiovascular: Negative for leg swelling.   Musculoskeletal: Positive for arthralgias.         Current Outpatient Medications:   •  Cholecalciferol (VITAMIN D) 1000 UNITS tablet, Take 1,000 Units by mouth., Disp: , Rfl:   •  diphenhydrAMINE-acetaminophen (TYLENOL PM)  MG tablet per tablet, Take 1 tablet by mouth At Night As Needed for Sleep., Disp: , Rfl:   •  famotidine (PEPCID) 20 MG tablet, Take 20 mg by mouth Daily., Disp: , Rfl:   •  HYDROcodone-acetaminophen (NORCO) 5-325 MG per tablet, Take 1 tablet by mouth Every 6 (Six) Hours As Needed for Moderate Pain ., Disp: 60 tablet, Rfl: 0  •  melatonin 5 MG tablet tablet, Take 5 mg by mouth At Night As Needed., Disp: , Rfl:    •  metoprolol succinate XL (TOPROL-XL) 25 MG 24 hr tablet, TAKE ONE TABLET BY MOUTH DAILY, Disp: 30 tablet, Rfl: 4  •  Multiple Vitamin (MULTIVITAMIN) tablet, Take 1 tablet by mouth Daily., Disp: , Rfl:   •  tiZANidine (ZANAFLEX) 4 MG tablet, Take 1 tablet by mouth Every 6 (Six) Hours As Needed for Muscle Spasms., Disp: 60 tablet, Rfl: 1        Objective     There were no vitals taken for this visit.    Physical Exam   Constitutional: She is oriented to person, place, and time.   Pulmonary/Chest: No respiratory distress.   Neurological: She is alert and oriented to person, place, and time.   Psychiatric: Her speech is normal.         Assessment/Plan   Diagnoses and all orders for this visit:    Closed fracture of ramus of right pubis, initial encounter (CMS/[facility])  -     HYDROcodone-acetaminophen (NORCO) 5-325 MG per tablet; Take 1 tablet by mouth Every 6 (Six) Hours As Needed for Moderate Pain .    Encouraged her to continue using the walker with ambulation.  Encouraged her to try to move around regularly during the day.  Encouraged social distancing, wearing a mask while out, frequent handwashing.     Telephone visit duration 11 minutes.

## 2020-05-08 ENCOUNTER — TELEPHONE (OUTPATIENT)
Dept: INTERNAL MEDICINE | Facility: CLINIC | Age: 75
End: 2020-05-08

## 2020-05-08 NOTE — TELEPHONE ENCOUNTER
PATIENT CALLED IN AND STATED SHE FELL AND BROKE HER PELVIC BONE AND IS STARTING HOME REHAB . PATIENT WANTS TO KNOW IF SHE CAN DO UP AND DOWN STAIRS NOW . PLEASE CALL PATIENT AND ADVISE -[phone].

## 2020-05-08 NOTE — TELEPHONE ENCOUNTER
Discussed at length with patient. Advised to call [facility] to see patient and continue PT and evaluation. Wali will need to instruct how to use stairs. Patient verbalized understanding.

## 2020-05-13 ENCOUNTER — TELEPHONE (OUTPATIENT)
Dept: INTERNAL MEDICINE | Facility: CLINIC | Age: 75
End: 2020-05-13

## 2020-05-13 ENCOUNTER — OFFICE VISIT (OUTPATIENT)
Dept: INTERNAL MEDICINE | Facility: CLINIC | Age: 75
End: 2020-05-13

## 2020-05-13 DIAGNOSIS — B02.9 HERPES ZOSTER WITHOUT COMPLICATION: Primary | ICD-10-CM

## 2020-05-13 PROBLEM — M17.11 PRIMARY LOCALIZED OSTEOARTHRITIS OF RIGHT KNEE: Status: ACTIVE | Noted: 2017-12-12

## 2020-05-13 PROCEDURE — 99441 PR PHYS/QHP TELEPHONE EVALUATION 5-10 MIN: CPT | Performed by: NURSE PRACTITIONER

## 2020-05-13 RX ORDER — VALACYCLOVIR HYDROCHLORIDE 1 G/1
1000 TABLET, FILM COATED ORAL 3 TIMES DAILY
Qty: 21 TABLET | Refills: 0 | Status: SHIPPED | OUTPATIENT
Start: 2020-05-13 | End: 2020-05-20

## 2020-05-13 NOTE — PROGRESS NOTES
Subjective   Deja Juárez is a 75 y.o. female.     She woke up at bout 4 am this morning due to mild pain to left breast area. She then noticed red rash that is under left breast. She also has some blisters. She has been under stress with recent pelvic fracture. She reports having similar symptoms last year and diagnosed with shingles.     Rash   This is a new problem. The current episode started today (about 4 am this morning ). The problem is unchanged. Location: left breast  The rash is characterized by itchiness, redness, burning, pain and blistering. She was exposed to nothing. Pertinent negatives include no congestion, cough, fatigue, fever, rhinorrhea or shortness of breath. Past treatments include nothing. The treatment provided mild relief.        The following portions of the patient's history were reviewed and updated as appropriate: allergies, current medications, past social history and problem list.    Review of Systems   Constitutional: Negative for activity change, appetite change, fatigue and fever.   HENT: Negative for congestion and rhinorrhea.    Respiratory: Negative for cough, shortness of breath and wheezing.    Cardiovascular: Negative for chest pain.   Skin: Positive for rash (right breast area, redness and sensitive, with blisters).       Objective   Physical Exam   Constitutional: She is oriented to person, place, and time.   Neurological: She is oriented to person, place, and time.   Psychiatric: She has a normal mood and affect. Her speech is normal and behavior is normal. Judgment and thought content normal. Cognition and memory are normal.       Assessment/Plan   Diagnoses and all orders for this visit:    Herpes zoster without complication  Comments:  may use lidocaine otc  Orders:  -     valACYclovir (Valtrex) 1000 MG tablet; Take 1 tablet by mouth 3 (Three) Times a Day for 7 days.      No visual assessment was performed due to patient unable to assess. Based on verbal symptoms  will treat as shingles. She has been advised to call if no improvement. She was advised to avoid immunocompromised individuals. She will send picture via [employer]. She was advised to consider shingrix vaccination in next several months (at local pharmacy).         You have chosen to receive care through a telephone visit. Do you consent to use a telephone visit for your medical care today? Yes  This visit has been rescheduled as a phone visit to comply with patient safety concerns in accordance with CDC recommendations. Total time of discussion was 9 minutes.

## 2020-05-13 NOTE — TELEPHONE ENCOUNTER
Patient called in stating she has shingles under her left breast. She would like to speak with the nurse regarding what should be done.    Please call back to advise @ [phone]

## 2020-05-14 ENCOUNTER — TELEPHONE (OUTPATIENT)
Dept: INTERNAL MEDICINE | Facility: CLINIC | Age: 75
End: 2020-05-14

## 2020-06-05 ENCOUNTER — RESULTS ENCOUNTER (OUTPATIENT)
Dept: INTERNAL MEDICINE | Facility: CLINIC | Age: 75
End: 2020-06-05

## 2020-06-05 ENCOUNTER — OFFICE VISIT (OUTPATIENT)
Dept: INTERNAL MEDICINE | Facility: CLINIC | Age: 75
End: 2020-06-05

## 2020-06-05 VITALS
DIASTOLIC BLOOD PRESSURE: 72 MMHG | OXYGEN SATURATION: 94 % | SYSTOLIC BLOOD PRESSURE: 126 MMHG | HEART RATE: 87 BPM | BODY MASS INDEX: 27.66 KG/M2 | HEIGHT: 65 IN | WEIGHT: 166 LBS

## 2020-06-05 DIAGNOSIS — Z78.0 POST-MENOPAUSAL: ICD-10-CM

## 2020-06-05 DIAGNOSIS — Z12.11 SCREENING FOR COLON CANCER: ICD-10-CM

## 2020-06-05 DIAGNOSIS — E78.00 ELEVATED LDL CHOLESTEROL LEVEL: ICD-10-CM

## 2020-06-05 DIAGNOSIS — Z00.00 MEDICARE ANNUAL WELLNESS VISIT, SUBSEQUENT: Primary | ICD-10-CM

## 2020-06-05 DIAGNOSIS — R00.2 PALPITATIONS: ICD-10-CM

## 2020-06-05 DIAGNOSIS — Z11.59 SCREENING FOR VIRAL DISEASE: ICD-10-CM

## 2020-06-05 PROCEDURE — G0439 PPPS, SUBSEQ VISIT: HCPCS | Performed by: INTERNAL MEDICINE

## 2020-06-05 NOTE — PROGRESS NOTES
The ABCs of the Annual Wellness Visit  Subsequent Medicare Wellness Visit    Chief Complaint   Patient presents with   • Medicare Wellness-subsequent       Subjective   History of Present Illness:  Deja Juárez is a 75 y.o. female who presents for a Subsequent Medicare Wellness Visit.    HEALTH RISK ASSESSMENT    Recent Hospitalizations:  Recently treated at the following:  Other: [facility] in 10/2019 for knee replacement, bilat    Current Medical Providers:  Patient Care Team:  Vicky Smiley MD as PCP - General (Internal Medicine)  Vicky Smiley MD as PCP - Claims Attributed  Asmita Fox APRN as Nurse Practitioner (Internal Medicine)    Smoking Status:  Social History     Tobacco Use   Smoking Status Former Smoker   • Packs/day: 1.00   • Years: 5.00   • Pack years: 5.00   • Types: Cigarettes   • Last attempt to quit: 1960   • Years since quittin.4   Smokeless Tobacco Never Used   Tobacco Comment    CAFFEINE USE       Alcohol Consumption:  Social History     Substance and Sexual Activity   Alcohol Use No       Depression Screen:   PHQ-2/PHQ-9 Depression Screening 2020   Little interest or pleasure in doing things 0   Feeling down, depressed, or hopeless 0   Trouble falling or staying asleep, or sleeping too much 0   Feeling tired or having little energy 0   Poor appetite or overeating 0   Feeling bad about yourself - or that you are a failure or have let yourself or your family down 0   Trouble concentrating on things, such as reading the newspaper or watching television 0   Moving or speaking so slowly that other people could have noticed. Or the opposite - being so fidgety or restless that you have been moving around a lot more than usual 0   Thoughts that you would be better off dead, or of hurting yourself in some way 0   Total Score 0   If you checked off any problems, how difficult have these problems made it for you to do your work, take care of things at home, or get along with other people?  -       Fall Risk Screen:  HEIKE Fall Risk Assessment was completed, and patient is at HIGH risk for falls. Assessment completed on:6/5/2020    Health Habits and Functional and Cognitive Screening:  Functional & Cognitive Status 6/5/2020   Do you have difficulty preparing food and eating? No   Do you have difficulty bathing yourself, getting dressed or grooming yourself? No   Do you have difficulty using the toilet? No   Do you have difficulty moving around from place to place? No   Do you have trouble with steps or getting out of a bed or a chair? No   Current Diet Unhealthy Diet   Dental Exam Up to date   Eye Exam Up to date   Exercise (times per week) 2 times per week   Current Exercise Activities Include Walking   Do you need help using the phone?  No   Are you deaf or do you have serious difficulty hearing?  No   Do you need help with transportation? No   Do you need help shopping? No   Do you need help preparing meals?  No   Do you need help with housework?  No   Do you need help with laundry? No   Do you need help taking your medications? No   Do you need help managing money? No   Do you ever drive or ride in a car without wearing a seat belt? No   Have you felt unusual stress, anger or loneliness in the last month? Yes   Who do you live with? Spouse   If you need help, do you have trouble finding someone available to you? No   Have you been bothered in the last four weeks by sexual problems? No   Do you have difficulty concentrating, remembering or making decisions? No         Does the patient have evidence of cognitive impairment? No    Asprin use counseling:Does not need ASA (and currently is not on it)    Age-appropriate Screening Schedule:  Refer to the list below for future screening recommendations based on patient's age, sex and/or medical conditions. Orders for these recommended tests are listed in the plan section. The patient has been provided with a written plan.    Health Maintenance   Topic  Date Due   • ZOSTER VACCINE (1 of 2) 01/11/1996   • COLONOSCOPY  04/13/2016   • LIPID PANEL  10/26/2019   • DXA SCAN  11/01/2019   • INFLUENZA VACCINE  08/01/2020   • MAMMOGRAM  03/03/2021   • TDAP/TD VACCINES (3 - Td) 04/22/2030          The following portions of the patient's history were reviewed and updated as appropriate: allergies, current medications, past family history, past medical history, past social history, past surgical history and problem list.    Outpatient Medications Prior to Visit   Medication Sig Dispense Refill   • Cholecalciferol (VITAMIN D) 1000 UNITS tablet Take 1,000 Units by mouth.     • HYDROcodone-acetaminophen (NORCO) 5-325 MG per tablet Take 1 tablet by mouth Every 6 (Six) Hours As Needed for Moderate Pain . 60 tablet 0   • metoprolol succinate XL (TOPROL-XL) 25 MG 24 hr tablet TAKE ONE TABLET BY MOUTH DAILY 30 tablet 4   • diphenhydrAMINE-acetaminophen (TYLENOL PM)  MG tablet per tablet Take 1 tablet by mouth At Night As Needed for Sleep.     • famotidine (PEPCID) 20 MG tablet Take 20 mg by mouth Daily.     • melatonin 5 MG tablet tablet Take 5 mg by mouth At Night As Needed.     • tiZANidine (ZANAFLEX) 4 MG tablet Take 1 tablet by mouth Every 6 (Six) Hours As Needed for Muscle Spasms. 60 tablet 1   • Multiple Vitamin (MULTIVITAMIN) tablet Take 1 tablet by mouth Daily.       No facility-administered medications prior to visit.        Patient Active Problem List   Diagnosis   • Hyperlipidemia   • Primary localized osteoarthritis of left knee   • Preop cardiovascular exam   • Abnormal ECG   • Knee pain   • GERD (gastroesophageal reflux disease)   • Primary localized osteoarthritis of right knee   • Palpitations       Advanced Care Planning:  ACP discussion was held with the patient during this visit. She states she has brought in a copy of advanced directive in the past    Review of Systems    Compared to one year ago, the patient feels her physical health is worse. Worse due to  "recent pelvic fx, she is making slow improvement.  Compared to one year ago, the patient feels her mental health is the same.    Reviewed chart for potential of high risk medication in the elderly: yes  Reviewed chart for potential of harmful drug interactions in the elderly:yes    Objective         Vitals:    06/05/20 0904   BP: 126/72   Pulse: 87   SpO2: 94%   Weight: 75.3 kg (166 lb)   Height: 165.1 cm (65\")       Body mass index is 27.62 kg/m².  Discussed the patient's BMI with her. The BMI is above average; BMI management plan is completed. Encouraged prudent diet and regular exercise.      Physical Exam          Assessment/Plan   Medicare Risks and Personalized Health Plan  CMS Preventative Services Quick Reference  Colon Cancer Screening  Fall Risk  Immunizations Discussed/Encouraged (specific immunizations; Shingrix and she will hold off on shingrix during COVID-19 pandemic )  Osteoprorosis Risk    The above risks/problems have been discussed with the patient.  Pertinent information has been shared with the patient in the After Visit Summary.  Follow up plans and orders are seen below in the Assessment/Plan Section.    Diagnoses and all orders for this visit:    1. Medicare annual wellness visit, subsequent (Primary)    2. Screening for viral disease  -     Hepatitis C Antibody    3. Screening for colon cancer  -     Cologuard - Stool, Per Rectum; Future    4. Palpitations  -     Comprehensive Metabolic Panel  -     CBC (No Diff)    5. Post-menopausal  -     DEXA Bone Density Axial; Future    6. Elevated LDL cholesterol level  -     Lipid Panel      Follow Up:  Return in about 6 months (around 12/5/2020) for Follow up, Fasting.     An After Visit Summary and PPPS were given to the patient.             "

## 2020-06-05 NOTE — PATIENT INSTRUCTIONS
Medicare Wellness  Personal Prevention Plan of Service     Date of Office Visit:  2020  Encounter Provider:  Vicky Smiley MD  Place of Service:  [facility]  Patient Name: Deja Juárez  :  1945    As part of the Medicare Wellness portion of your visit today, we are providing you with this personalized preventive plan of services (PPPS). This plan is based upon recommendations of the [place] Preventive Services Task Force (USPSTF) and the Advisory Committee on Immunization Practices (ACIP).    This lists the preventive care services that should be considered, and provides dates of when you are due. Items listed as completed are up-to-date and do not require any further intervention.    Health Maintenance   Topic Date Due   • ZOSTER VACCINE (1 of 2) 1996   • HEPATITIS C SCREENING  2016   • COLONOSCOPY  2016   • LIPID PANEL  10/26/2019   • DXA SCAN  2019   • MEDICARE ANNUAL WELLNESS  2020   • INFLUENZA VACCINE  2020   • MAMMOGRAM  2021   • TDAP/TD VACCINES (3 - Td) 2030   • Pneumococcal Vaccine Once at 65 Years Old  Completed       Orders Placed This Encounter   Procedures   • DEXA Bone Density Axial     Standing Status:   Future     Standing Expiration Date:   2021     Order Specific Question:   Reason for Exam:     Answer:   screening for osteoporosis   • Hepatitis C Antibody   • Comprehensive Metabolic Panel   • Lipid Panel   • Cologuard - Stool, Per Rectum     Standing Status:   Future     Standing Expiration Date:   2021       Return in about 6 months (around 2020) for Follow up, Fasting.

## 2020-06-06 LAB
ALBUMIN SERPL-MCNC: 4.4 G/DL (ref 3.5–5.2)
ALBUMIN/GLOB SERPL: 1.6 G/DL
ALP SERPL-CCNC: 361 U/L (ref 39–117)
ALT SERPL-CCNC: 19 U/L (ref 1–33)
AST SERPL-CCNC: 16 U/L (ref 1–32)
BILIRUB SERPL-MCNC: 0.6 MG/DL (ref 0.2–1.2)
BUN SERPL-MCNC: 16 MG/DL (ref 8–23)
BUN/CREAT SERPL: 23.2 (ref 7–25)
CALCIUM SERPL-MCNC: 9.7 MG/DL (ref 8.6–10.5)
CHLORIDE SERPL-SCNC: 99 MMOL/L (ref 98–107)
CHOLEST SERPL-MCNC: 240 MG/DL (ref 0–200)
CO2 SERPL-SCNC: 25.6 MMOL/L (ref 22–29)
CREAT SERPL-MCNC: 0.69 MG/DL (ref 0.57–1)
ERYTHROCYTE [DISTWIDTH] IN BLOOD BY AUTOMATED COUNT: 12.3 % (ref 12.3–15.4)
GLOBULIN SER CALC-MCNC: 2.8 GM/DL
GLUCOSE SERPL-MCNC: 95 MG/DL (ref 65–99)
HCT VFR BLD AUTO: 42.2 % (ref 34–46.6)
HCV AB S/CO SERPL IA: <0.1 S/CO RATIO (ref 0–0.9)
HDLC SERPL-MCNC: 54 MG/DL (ref 40–60)
HGB BLD-MCNC: 13.8 G/DL (ref 12–15.9)
LDLC SERPL CALC-MCNC: 149 MG/DL (ref 0–100)
MCH RBC QN AUTO: 28.8 PG (ref 26.6–33)
MCHC RBC AUTO-ENTMCNC: 32.7 G/DL (ref 31.5–35.7)
MCV RBC AUTO: 88.1 FL (ref 79–97)
PLATELET # BLD AUTO: 416 10*3/MM3 (ref 140–450)
POTASSIUM SERPL-SCNC: 4.8 MMOL/L (ref 3.5–5.2)
PROT SERPL-MCNC: 7.2 G/DL (ref 6–8.5)
RBC # BLD AUTO: 4.79 10*6/MM3 (ref 3.77–5.28)
SODIUM SERPL-SCNC: 134 MMOL/L (ref 136–145)
TRIGL SERPL-MCNC: 184 MG/DL (ref 0–150)
VLDLC SERPL CALC-MCNC: 36.8 MG/DL
WBC # BLD AUTO: 6.71 10*3/MM3 (ref 3.4–10.8)

## 2020-06-22 ENCOUNTER — TELEPHONE (OUTPATIENT)
Dept: INTERNAL MEDICINE | Facility: CLINIC | Age: 75
End: 2020-06-22

## 2020-06-22 NOTE — TELEPHONE ENCOUNTER
Caller: Deja Juárez    Relationship: Self    Best call back number: [phone]    Caller requesting test results: LAB RESULTS     What test was performed: CBC, LIPID, CMP, HEP C    When was the test performed: 6/5/20    Where was the test performed: [facility]    Additional notes: PT WOULD LIKE TO HAVE THE LAB RESULTS MAILED TO HER ADDRESS IN HER CHART. IT HAS BEEN VERIFIED AS CORRECT.

## 2020-07-23 ENCOUNTER — OFFICE VISIT (OUTPATIENT)
Dept: INTERNAL MEDICINE | Facility: CLINIC | Age: 75
End: 2020-07-23

## 2020-07-23 VITALS
BODY MASS INDEX: 27.99 KG/M2 | RESPIRATION RATE: 16 BRPM | OXYGEN SATURATION: 95 % | HEIGHT: 65 IN | DIASTOLIC BLOOD PRESSURE: 64 MMHG | WEIGHT: 168 LBS | TEMPERATURE: 98.4 F | HEART RATE: 87 BPM | SYSTOLIC BLOOD PRESSURE: 135 MMHG

## 2020-07-23 DIAGNOSIS — H10.021 OTHER MUCOPURULENT CONJUNCTIVITIS OF RIGHT EYE: Primary | ICD-10-CM

## 2020-07-23 PROCEDURE — 99213 OFFICE O/P EST LOW 20 MIN: CPT | Performed by: NURSE PRACTITIONER

## 2020-07-23 RX ORDER — CIPROFLOXACIN HYDROCHLORIDE 3.5 MG/ML
1 SOLUTION/ DROPS TOPICAL 4 TIMES DAILY
Qty: 10 ML | Refills: 0 | OUTPATIENT
Start: 2020-07-23 | End: 2020-09-05

## 2020-07-23 RX ORDER — AZELASTINE HYDROCHLORIDE 0.5 MG/ML
1 SOLUTION/ DROPS OPHTHALMIC 2 TIMES DAILY
Qty: 6 ML | Refills: 0 | Status: SHIPPED | OUTPATIENT
Start: 2020-07-23 | End: 2021-07-08

## 2020-07-23 NOTE — PROGRESS NOTES
Subjective     Deja Juárez is a 75 y.o. female.         She presents for eye redness x 2 days.    Conjunctivitis    The current episode started yesterday. The onset was sudden. The problem has been gradually worsening. The problem is moderate. Nothing relieves the symptoms. Nothing aggravates the symptoms. Associated symptoms include eye itching, eye discharge and eye redness. Pertinent negatives include no fever, no decreased vision, no double vision, no photophobia, no congestion, no ear discharge, no ear pain, no headaches, no rhinorrhea, no sore throat, no cough and no eye pain. The right eye is affected.        The following portions of the patient's history were reviewed and updated as appropriate: allergies, current medications, past social history and problem list.    Past Medical History:   Diagnosis Date   • Anxiety    • Depression    • GERD (gastroesophageal reflux disease)    • Hyperlipidemia    • Hypertension    • Scoliosis          Current Outpatient Medications:   •  Cholecalciferol (VITAMIN D) 1000 UNITS tablet, Take 1,000 Units by mouth., Disp: , Rfl:   •  metoprolol succinate XL (TOPROL-XL) 25 MG 24 hr tablet, TAKE ONE TABLET BY MOUTH DAILY, Disp: 30 tablet, Rfl: 4  •  Multiple Vitamin (MULTIVITAMIN) tablet, Take 1 tablet by mouth Daily., Disp: , Rfl:   •  azelastine (OPTIVAR) 0.05 % ophthalmic solution, Administer 1 drop to the right eye 2 (Two) Times a Day., Disp: 6 mL, Rfl: 0  •  ciprofloxacin (CILOXAN) 0.3 % ophthalmic solution, Administer 1 drop to the right eye 4 (Four) Times a Day., Disp: 10 mL, Rfl: 0    No Known Allergies    Review of Systems   Constitutional: Negative for fever.   HENT: Negative for congestion, ear discharge, ear pain, rhinorrhea and sore throat.    Eyes: Positive for discharge, redness and itching. Negative for double vision, photophobia and pain.   Respiratory: Negative for cough.    Neurological: Negative for headaches.       Objective     /64 (BP Location:  "Right arm, Patient Position: Sitting, Cuff Size: Adult)   Pulse 87   Temp 98.4 °F (36.9 °C) (Oral)   Resp 16   Ht 165.1 cm (65\")   Wt 76.2 kg (168 lb)   SpO2 95%   BMI 27.96 kg/m²   Wt Readings from Last 3 Encounters:   07/23/20 76.2 kg (168 lb)   06/05/20 75.3 kg (166 lb)   04/22/20 74.8 kg (165 lb)     Temp Readings from Last 3 Encounters:   07/23/20 98.4 °F (36.9 °C) (Oral)   04/22/20 99.6 °F (37.6 °C)   01/09/20 98.1 °F (36.7 °C)     BP Readings from Last 3 Encounters:   07/23/20 135/64   06/05/20 126/72   04/22/20 117/58     Pulse Readings from Last 3 Encounters:   07/23/20 87   06/05/20 87   04/22/20 84       Physical Exam   Constitutional: She appears well-developed and well-nourished.   HENT:   Head: Normocephalic and atraumatic.   Eyes: Pupils are equal, round, and reactive to light. EOM and lids are normal. Right conjunctiva is injected. Right conjunctiva has no hemorrhage.   Pulmonary/Chest: Effort normal. No respiratory distress.   Musculoskeletal: Normal range of motion.   Neurological: She is alert.   Skin: Skin is warm and dry.   Psychiatric: She has a normal mood and affect. Her behavior is normal. Judgment and thought content normal.   Vitals reviewed.      Assessment/Plan     Deja was seen today for conjunctivitis.    Diagnoses and all orders for this visit:    Other mucopurulent conjunctivitis of right eye  -     azelastine (OPTIVAR) 0.05 % ophthalmic solution; Administer 1 drop to the right eye 2 (Two) Times a Day.  -     ciprofloxacin (CILOXAN) 0.3 % ophthalmic solution; Administer 1 drop to the right eye 4 (Four) Times a Day.    Refrain from touching eyes.    Return for worsening of sx.               "

## 2020-09-05 ENCOUNTER — HOSPITAL ENCOUNTER (EMERGENCY)
Facility: HOSPITAL | Age: 75
Discharge: HOME | End: 2020-09-05
Attending: EMERGENCY MEDICINE | Admitting: EMERGENCY MEDICINE

## 2020-09-05 VITALS
OXYGEN SATURATION: 95 % | TEMPERATURE: 99 F | BODY MASS INDEX: 27.96 KG/M2 | SYSTOLIC BLOOD PRESSURE: 133 MMHG | HEART RATE: 76 BPM | RESPIRATION RATE: 16 BRPM | DIASTOLIC BLOOD PRESSURE: 94 MMHG | HEIGHT: 65 IN

## 2020-09-05 DIAGNOSIS — H10.9 BACTERIAL CONJUNCTIVITIS OF RIGHT EYE: Primary | ICD-10-CM

## 2020-09-05 PROCEDURE — 99283 EMERGENCY DEPT VISIT LOW MDM: CPT

## 2020-09-05 RX ORDER — TETRACAINE HYDROCHLORIDE 5 MG/ML
2 SOLUTION OPHTHALMIC ONCE
Status: COMPLETED | OUTPATIENT
Start: 2020-09-05 | End: 2020-09-05

## 2020-09-05 RX ORDER — ERYTHROMYCIN 5 MG/G
OINTMENT OPHTHALMIC 2 TIMES DAILY
Qty: 1 G | Refills: 0 | Status: SHIPPED | OUTPATIENT
Start: 2020-09-05 | End: 2020-09-12

## 2020-09-05 RX ORDER — TETRACAINE HYDROCHLORIDE 5 MG/ML
2 SOLUTION OPHTHALMIC ONCE
Status: DISCONTINUED | OUTPATIENT
Start: 2020-09-05 | End: 2020-09-05

## 2020-09-05 RX ORDER — POLYMYXIN B SULFATE AND TRIMETHOPRIM 1; 10000 MG/ML; [USP'U]/ML
1 SOLUTION OPHTHALMIC
Qty: 10 ML | Refills: 0 | Status: SHIPPED | OUTPATIENT
Start: 2020-09-05 | End: 2020-09-12

## 2020-09-05 RX ORDER — PROPARACAINE HYDROCHLORIDE 5 MG/ML
1 SOLUTION/ DROPS OPHTHALMIC ONCE
Status: DISCONTINUED | OUTPATIENT
Start: 2020-09-05 | End: 2020-09-05

## 2020-09-05 RX ADMIN — FLUORESCEIN SODIUM 1 STRIP: 1 STRIP OPHTHALMIC at 20:41

## 2020-09-05 RX ADMIN — TETRACAINE HYDROCHLORIDE 2 DROP: 5 SOLUTION OPHTHALMIC at 20:41

## 2020-09-05 NOTE — ED TRIAGE NOTES
"To ER via [facility].  Pt c/o redness, burning, \"it runs and hurts\".  X 3-4 days.  Pt has redness under rt eye as well.  States was working in yard last weekend and thinks maybe that started it.     Pt in mask at time of triage.  Triage staff in appropriate PPE.   "

## 2020-09-06 DIAGNOSIS — I49.3 PVC (PREMATURE VENTRICULAR CONTRACTION): ICD-10-CM

## 2020-09-06 DIAGNOSIS — R00.2 PALPITATIONS: ICD-10-CM

## 2020-09-06 NOTE — ED PROVIDER NOTES
EMERGENCY DEPARTMENT ENCOUNTER    Room Number: 07/07  Date seen: 9/6/2020  Time seen: 8:14 PM  PCP: Vicky Smiley MD    Spoken Language:  English  Language interpretation services not needed     CHIEF COMPLAINT: right eye pain    Primary Care Provider: Vicky Smiley MD   Ophthalmologist: None    HPI: Deja Juárez is a 75 y.o. female presenting to the ED for evaluation of right eye pain. The history is being obtained by the patient and by review of the medical chart.  The patient complains of right pain which is been present for approximately 4 days.  The patient states that over last weekend she was out working in her garden and noted that she was having itchy eyes secondary to her allergies.  She believes that she may have scratched her eye and got something in it.  The patient states that she had a similar episode to this in July 2020, which improved with Cipro ophthalmic drops.  Patient does not wear contacts.  She states that the eye itches and burns.  She denies any vision changes.  The patient denies fever, nausea or vomiting.  She denies headache, cough, sore throat, chest pain, shortness of breath or abdominal pain.  She states that the itching and burning is constant.  She denies any relieving factors.    MEDICAL RECORD REVIEW:  Reviewed in [facility].     PAST MEDICAL HISTORY  Past Medical History:   Diagnosis Date   • Anxiety    • Depression    • GERD (gastroesophageal reflux disease)    • Hyperlipidemia    • Hypertension    • Scoliosis        PAST SURGICAL HISTORY  Past Surgical History:   Procedure Laterality Date   • EYE SURGERY Left 02/06/2019q   • LIPOMA EXCISION      from shoulder   • TOTAL KNEE ARTHROPLASTY Left 2017       FAMILY HISTORY  Family History   Problem Relation Age of Onset   • Hypertension Mother    • Diabetes Father    • Hypertension Father        SOCIAL HISTORY  Social History     Socioeconomic History   • Marital status:      Spouse name: Not on file   • Number of children: Not on  file   • Years of education: Not on file   • Highest education level: Not on file   Tobacco Use   • Smoking status: Former Smoker     Packs/day: 1.00     Years: 5.00     Pack years: 5.00     Types: Cigarettes     Last attempt to quit: 1960     Years since quittin.7   • Smokeless tobacco: Never Used   • Tobacco comment: CAFFEINE USE   Substance and Sexual Activity   • Alcohol use: No   • Drug use: No       CURRENT MEDICATIONS  Prior to Admission medications    Medication Sig Start Date End Date Taking? Authorizing Provider   Cholecalciferol (VITAMIN D) 1000 UNITS tablet Take 1,000 Units by mouth.   Yes Yg Hall MD   metoprolol succinate XL (TOPROL-XL) 25 MG 24 hr tablet TAKE ONE TABLET BY MOUTH DAILY 20  Yes Vicky Smiley MD   azelastine (OPTIVAR) 0.05 % ophthalmic solution Administer 1 drop to the right eye 2 (Two) Times a Day. 20   Shari Cazares APRN   ciprofloxacin (CILOXAN) 0.3 % ophthalmic solution Administer 1 drop to the right eye 4 (Four) Times a Day. 20   Shari Cazares APRN   Multiple Vitamin (MULTIVITAMIN) tablet Take 1 tablet by mouth Daily.    ProviderYg MD       ALLERGIES  Patient has no known allergies.    REVIEW OF SYSTEMS  All systems reviewed and negative except for those discussed in HPI.     PHYSICAL EXAM  ED Triage Vitals   Temp Heart Rate Resp BP SpO2   20   99 °F (37.2 °C) 114 18 153/81 95 %      Temp src Heart Rate Source Patient Position BP Location FiO2 (%)   20 -- -- --   Tympanic Monitor          Physical Exam   Constitutional: She is oriented to person, place, and time. She appears well-developed and well-nourished. No distress.   HENT:   Head: Normocephalic and atraumatic.   Eyes: Pupils are equal, round, and reactive to light. EOM are normal. Right eye exhibits discharge. Scleral icterus is present.   Yellow drainage noted in the right eye.  No  corneal abrasion seen after fluorescein stain on woods lamp examination.   Cardiovascular: Normal rate and regular rhythm. Exam reveals no gallop and no friction rub.   No murmur heard.  Pulmonary/Chest: Effort normal and breath sounds normal. No respiratory distress.   Abdominal: Soft.   Neurological: She is alert and oriented to person, place, and time.   Psychiatric: She has a normal mood and affect. Her behavior is normal.       PROCEDURES  Procedures  None    LABS  No results found for this or any previous visit (from the past 24 hour(s)).    RADIOLOGY  No orders to display       MEDICATIONS GIVEN IN THE ER  Medications   tetracaine (ALTACAINE) 0.5 % ophthalmic solution 2 drop (2 drops Right Eye Given by Other 9/5/20 2041)   fluorescein ophthalmic strip 1 strip (1 strip Right Eye Given by Other 9/5/20 2041)       MEDICAL DECISION MAKING, CONSULTS AND PROGRESS NOTES  All labs and all radiology studies were have been viewed and interpreted by me.   Discussion below represents my analysis of pertinent findings related to patient's condition, differential diagnosis, treatment plan and final disposition.    PPE: The patient was placed in a face mask in first look. Patient was wearing facemask when I entered the room and throughout our encounter. I wore full protective equipment throughout this patient encounter including a face mask, eye shield and gloves. Hand hygiene was performed before donning protective equipment and after removal when leaving the room.    AS OF 01:26 VITALS:    BP - 133/94  HR - 76  TEMP - 99 °F (37.2 °C) (Tympanic)  O2 SATS - 95%    The patient's history, physical exam, and lab findings were discussed with the physician, who also performed a face to face history and physical exam.  I discussed all results and noted any abnormalities with patient.  Discussed absoute need to recheck abnormalities with their family physician.  I answered any of the patient's questions.  Discussed plan for  discharge, as there is no emergent indication for admission.  Pt is agreeable and understands need for follow up and repeat testing.  Pt is aware that discharge does not mean that nothing is wrong but it indicates no emergency is present and they must continue care with their family physician.  Pt is discharged with instructions to follow up with primary care doctor to have their blood pressure rechecked.     Diagnosis   Diagnosis Plan   1. Bacterial conjunctivitis of right eye         Disposition  ED Disposition     ED Disposition Condition Comment    Discharge Stable           Follow Up  Vicky Smiley MD  [address] [facility]  [place] [address]  [phone]    Schedule an appointment as soon as possible for a visit       Marie Sarkar MD  [address]  [place] [address]  [phone]    Schedule an appointment as soon as possible for a visit         Rx     Medication List      New Prescriptions    erythromycin 5 MG/GM ophthalmic ointment  Commonly known as:  ROMYCIN  Apply  to eye(s) as directed by provider 2 (Two) Times a Day for 7 days.   Apply into the affected eye 2 times daily.     trimethoprim-polymyxin b 07186-5.1 UNIT/ML-% ophthalmic solution  Commonly known as:  POLYTRIM  Administer 1 drop to the right eye Every 3 (Three) Hours for 7 days.   Administer 1 drop to right eye every 3 hours while awake.  Do not exceed 6   drops per day.        Stop    ciprofloxacin 0.3 % ophthalmic solution  Commonly known as:  CILOXAN        Provider Attestation:  I personally reviewed the past medical history, past surgical history, social history, family history, current medications and allergies as they appear in the chart. I reviewed the patient's history, physical, lab/imaging results and overall care with Dr. Rao who is in agreement with the patient's treatment plan.    EMR Dragon/Transcription disclaimer:  Some of this encounter note is an electronic transcription/translation of spoken  language to printed text. The electronic translation of spoken language may permit erroneous, or at times, nonsensical words or phrases to be inadvertently transcribed; Although I have reviewed the note for such errors, some may still exist.    Provider note signed by:         Devika Woodall PA  09/05/20 2053       Devika Woodall PA  09/06/20 [id number]

## 2020-09-06 NOTE — ED PROVIDER NOTES
Pt presents to the ED c/o  couple days of right eye pain, redness, drainage.  Denies any swelling or decreased visual acuity.  Started after she was working in her garden.     On exam,   General: Awake, alert, no acute distress  HEENT: EOMI, right generalized conjunctival injection with some very faint yellowish discharge at the medial aspect of the eye without periorbital edema or cellulitis  Pulm: Symmetric chest rise, nonlabored breathing  CV: Regular rate and rhythm  GI: Non-distended  MSK: No deformity  Skin: Warm, dry  Neuro: Alert and oriented x 3, moving all extremities, no focal deficits  Psych: Calm, cooperative    Vital signs and nursing notes reviewed.       Surgical mask, protective eye goggles, and gloves used during this encounter. Patient in surgical mask.      Plan: Erythromycin ointment, antibacterial drops, and ophthalmology follow-up.  Warm compresses, Tylenol and ibuprofen, and return to the emergency department for worsening symptoms as needed.       Attestation:  The KALYAN and I have discussed this patient's history, physical exam, and treatment plan.  I have reviewed the documentation and personally had a face to face interaction with the patient. I affirm the documentation and agree with the treatment and plan.  The attached note describes my personal findings.            Giancarlo Rao MD  09/05/20 [id number]

## 2020-09-06 NOTE — DISCHARGE INSTRUCTIONS
Although you are being discharged from the ED today, I encourage you to return for worsening symptoms. Things can, and do, change such that treatment at home with medication may not be adequate. Specifically I recommend returning for chest pain or discomfort, difficulty breathing, persistent vomiting or difficulty holding down liquids or medications, fever > 102.0 F or any other worsening or alarming symptoms.     Rest. Drink plenty of fluids.  Follow up with Dr. Sarkar (ophthalmologist) for further evaluation and management.  Follow up with primary care provider for further management and to have blood pressure rechecked.  Use your medications as prescribed.

## 2020-09-07 RX ORDER — METOPROLOL SUCCINATE 25 MG/1
TABLET, EXTENDED RELEASE ORAL
Qty: 30 TABLET | Refills: 5 | Status: SHIPPED | OUTPATIENT
Start: 2020-09-07 | End: 2021-08-19

## 2020-11-13 NOTE — PROGRESS NOTES
"Subjective   Deja Juárez is a 74 y.o. female who presents due to palpitations.    She was visiting her brother in the  earlier today when she c/o \"fluttering\" in her chest which has been intermittent and lasts for several seconds (denies symptoms now). Denies shortness of breath.  Her recent CXR shows enlargement of the heart. Denies swelling of lower extremities.  She does c/o increased anxiety which has been chronic for her and has tried several medications.      Palpitations    This is a new problem. The current episode started today. The problem occurs intermittently. The problem has been waxing and waning. The symptoms are aggravated by caffeine and stress. Associated symptoms include anxiety, an irregular heartbeat and malaise/fatigue. Pertinent negatives include no chest pain, coughing, fever, nausea, near-syncope, shortness of breath, vomiting or weakness. She has tried nothing for the symptoms.        The following portions of the patient's history were reviewed and updated as appropriate: allergies, current medications, past social history and problem list.    Past Medical History:   Diagnosis Date   • Anxiety    • Depression    • GERD (gastroesophageal reflux disease)    • Hyperlipidemia    • Scoliosis          Current Outpatient Medications:   •  aspirin 81 MG EC tablet, Take 81 mg by mouth Daily., Disp: , Rfl:   •  atorvastatin (LIPITOR) 40 MG tablet, TAKE ONE TABLET BY MOUTH DAILY, Disp: 90 tablet, Rfl: 0  •  calcium carbonate-vitamin d (CALTRATE 600+D) 600-400 MG-UNIT per tablet, Take 1 tablet by mouth., Disp: , Rfl:   •  Cholecalciferol (VITAMIN D) 1000 UNITS tablet, Take 1,000 Units by mouth., Disp: , Rfl:   •  famotidine (PEPCID) 20 MG tablet, Take 20 mg by mouth Daily., Disp: , Rfl:   •  Multiple Vitamin (MULTIVITAMIN) tablet, Take 1 tablet by mouth Daily., Disp: , Rfl:     No Known Allergies    Review of Systems   Constitutional: Positive for malaise/fatigue. Negative for chills, " She is due mid January for physical per last note of Dr Briggs    Her venlafaxine was already refilled yesterday by Dr Briggs    THis relayed to pt and appt made for [facility] physical    Darlin Valle, RN, BSN        "fatigue, fever and unexpected weight change.   HENT: Negative for congestion, ear pain, postnasal drip, sinus pressure, sore throat and trouble swallowing.    Eyes: Negative for visual disturbance.   Respiratory: Negative for cough, chest tightness, shortness of breath and wheezing.    Cardiovascular: Positive for palpitations. Negative for chest pain, leg swelling and near-syncope.   Gastrointestinal: Negative for abdominal pain, blood in stool, nausea and vomiting.   Genitourinary: Negative for dysuria, frequency and urgency.   Musculoskeletal: Negative for arthralgias and joint swelling.   Skin: Negative for color change.   Neurological: Negative for syncope, weakness and headaches.   Hematological: Does not bruise/bleed easily.   Psychiatric/Behavioral: The patient is nervous/anxious.        Objective   Vitals:    02/14/19 1353   BP: 138/80   BP Location: Left arm   Patient Position: Sitting   Cuff Size: Adult   Pulse: 80   Temp: 98.4 °F (36.9 °C)   TempSrc: Oral   SpO2: 98%   Weight: 75.8 kg (167 lb)   Height: 165.1 cm (65\")       Physical Exam   Constitutional: She appears well-developed and well-nourished. She is cooperative. She does not have a sickly appearance. She does not appear ill.   HENT:   Head: Normocephalic.   Right Ear: Hearing, tympanic membrane and external ear normal.   Left Ear: Hearing, tympanic membrane and external ear normal.   Nose: Nose normal. No mucosal edema, rhinorrhea, sinus tenderness or nasal deformity. Right sinus exhibits no maxillary sinus tenderness and no frontal sinus tenderness. Left sinus exhibits no maxillary sinus tenderness and no frontal sinus tenderness.   Mouth/Throat: Oropharynx is clear and moist and mucous membranes are normal. Normal dentition.   Eyes: Conjunctivae and lids are normal. Right eye exhibits no discharge and no exudate. Left eye exhibits no discharge and no exudate.   Neck: Trachea normal. No edema present. No thyroid mass present.   Cardiovascular: " Regular rhythm, normal heart sounds and normal pulses.   No murmur heard.  Pulmonary/Chest: Breath sounds normal. No respiratory distress. She has no decreased breath sounds. She has no wheezes. She has no rhonchi. She has no rales.   Lymphadenopathy:        Head (right side): No submental, no submandibular, no tonsillar, no preauricular, no posterior auricular and no occipital adenopathy present.        Head (left side): No submental, no submandibular, no tonsillar, no preauricular, no posterior auricular and no occipital adenopathy present.   Neurological: She is alert.   Skin: Skin is warm, dry and intact. No cyanosis. Nails show no clubbing.       Assessment/Plan   Deja was seen today for heart flutters.    Diagnoses and all orders for this visit:    Palpitations  -     TSH; Future  -     Comprehensive Metabolic Panel; Future  -     CBC Auto Differential; Future  -     TSH  -     Comprehensive Metabolic Panel  -     CBC Auto Differential    Cardiomegaly  Comments:  noted on recent CXR, will send for echo to further evaluate  Orders:  -     Adult Transthoracic Echo Complete W/ Cont if Necessary Per Protocol; Future    Anxiety  Comments:  will consult with Dr. Smiley regarding further mgmt (has taken Paxil, Citalopram, Duloxetine)    Other orders  -     ECG 12 Lead      ECG 12 Lead  Date/Time: 2/14/2019 2:30 AM  Performed by: Allie Ardon MA  Authorized by: Asmita Fox APRN   Comparison: compared with previous ECG from 8/22/2017  Comparison to previous ECG: Premature ventricular complexes  Rhythm: sinus rhythm  Ectopy: multifocal PVCs  Rate: normal  BPM: 74  ST Segments: ST segments normal  T Waves: T waves normal    Clinical impression: non-specific ECG  Comments: Right and mid precordial repolarization disturbance, high-lateral repolarization disturbance        Discussed PVCs noted on ECG which may be source of palpitations; however, she may need an echo to further evaluate.  She underwent cataract  surgery arnol 9 days ago and is recovering well.

## 2020-12-01 ENCOUNTER — CLINICAL SUPPORT (OUTPATIENT)
Dept: INTERNAL MEDICINE | Facility: CLINIC | Age: 75
End: 2020-12-01

## 2020-12-01 DIAGNOSIS — Z78.0 POST-MENOPAUSAL: ICD-10-CM

## 2020-12-01 PROCEDURE — 77080 DXA BONE DENSITY AXIAL: CPT | Performed by: INTERNAL MEDICINE

## 2020-12-10 ENCOUNTER — OFFICE VISIT (OUTPATIENT)
Dept: INTERNAL MEDICINE | Facility: CLINIC | Age: 75
End: 2020-12-10

## 2020-12-10 VITALS
HEIGHT: 65 IN | TEMPERATURE: 97.6 F | DIASTOLIC BLOOD PRESSURE: 70 MMHG | WEIGHT: 159 LBS | BODY MASS INDEX: 26.49 KG/M2 | SYSTOLIC BLOOD PRESSURE: 110 MMHG

## 2020-12-10 DIAGNOSIS — E78.5 HYPERLIPIDEMIA, UNSPECIFIED HYPERLIPIDEMIA TYPE: Primary | ICD-10-CM

## 2020-12-10 DIAGNOSIS — R00.2 PALPITATIONS: ICD-10-CM

## 2020-12-10 LAB
ALBUMIN SERPL-MCNC: 4.4 G/DL (ref 3.5–5.2)
ALBUMIN/GLOB SERPL: 1.9 G/DL
ALP SERPL-CCNC: 104 U/L (ref 39–117)
ALT SERPL-CCNC: 15 U/L (ref 1–33)
AST SERPL-CCNC: 15 U/L (ref 1–32)
BILIRUB SERPL-MCNC: 0.8 MG/DL (ref 0–1.2)
BUN SERPL-MCNC: 14 MG/DL (ref 8–23)
BUN/CREAT SERPL: 19.7 (ref 7–25)
CALCIUM SERPL-MCNC: 9.6 MG/DL (ref 8.6–10.5)
CHLORIDE SERPL-SCNC: 100 MMOL/L (ref 98–107)
CHOLEST SERPL-MCNC: 253 MG/DL (ref 0–200)
CHOLEST/HDLC SERPL: 4.36 {RATIO}
CO2 SERPL-SCNC: 28.5 MMOL/L (ref 22–29)
CREAT SERPL-MCNC: 0.71 MG/DL (ref 0.57–1)
GLOBULIN SER CALC-MCNC: 2.3 GM/DL
GLUCOSE SERPL-MCNC: 97 MG/DL (ref 65–99)
HDLC SERPL-MCNC: 58 MG/DL (ref 40–60)
LDLC SERPL CALC-MCNC: 164 MG/DL (ref 0–100)
POTASSIUM SERPL-SCNC: 4.6 MMOL/L (ref 3.5–5.2)
PROT SERPL-MCNC: 6.7 G/DL (ref 6–8.5)
SODIUM SERPL-SCNC: 137 MMOL/L (ref 136–145)
TRIGL SERPL-MCNC: 173 MG/DL (ref 0–150)
VLDLC SERPL CALC-MCNC: 31 MG/DL (ref 5–40)

## 2020-12-10 PROCEDURE — 99213 OFFICE O/P EST LOW 20 MIN: CPT | Performed by: INTERNAL MEDICINE

## 2020-12-10 NOTE — PROGRESS NOTES
"Chief Complaint   Patient presents with   • Hyperlipidemia       Subjective   Deja Juárez is a 75 y.o. female.     Hyperlipidemia  Pertinent negatives include no chest pain or shortness of breath.      Hyperlipidemia:    This is a chronic problem.  Her most recent lipid panel showed:  Lab Results   Component Value Date    CHOL 200 10/26/2018    CHLPL 240 (H) 06/05/2020    TRIG 184 (H) 06/05/2020    HDL 54 06/05/2020     (H) 06/05/2020     Current treatment: She tries to control this with diet.  She has recently been following the Keto diet. She realizes it is higher in fat.    Palpitations: She continues to take metoprolol.  She has not noticed palpitations recently.   She wonders if she can stop metoprolol.    The following portions of the patient's history were reviewed and updated as appropriate: allergies, current medications, past family history, past medical history, past social history, past surgical history and problem list.    Review of Systems   Constitutional: Negative for appetite change.   HENT: Negative for nosebleeds.    Eyes: Negative for blurred vision and double vision.   Respiratory: Negative for cough and shortness of breath.    Cardiovascular: Negative for chest pain, palpitations and leg swelling.         Current Outpatient Medications:   •  azelastine (OPTIVAR) 0.05 % ophthalmic solution, Administer 1 drop to the right eye 2 (Two) Times a Day., Disp: 6 mL, Rfl: 0  •  Cholecalciferol (VITAMIN D) 1000 UNITS tablet, Take 1,000 Units by mouth., Disp: , Rfl:   •  metoprolol succinate XL (TOPROL-XL) 25 MG 24 hr tablet, TAKE ONE TABLET BY MOUTH DAILY, Disp: 30 tablet, Rfl: 5  •  Multiple Vitamin (MULTIVITAMIN) tablet, Take 1 tablet by mouth Daily., Disp: , Rfl:         Objective     /70   Temp 97.6 °F (36.4 °C)   Ht 165.1 cm (65\")   Wt 72.1 kg (159 lb)   BMI 26.46 kg/m²     Physical Exam  Vitals signs and nursing note reviewed.   Constitutional:       General: She is not in acute " distress.     Appearance: She is well-developed.   Neck:      Vascular: Normal carotid pulses. No carotid bruit.   Cardiovascular:      Rate and Rhythm: Regular rhythm.      Pulses:           Carotid pulses are 2+ on the right side and 2+ on the left side.     Heart sounds: S1 normal and S2 normal. No murmur. No friction rub. No gallop.    Pulmonary:      Effort: Pulmonary effort is normal.      Breath sounds: Normal breath sounds. No wheezing, rhonchi or rales.   Chest:      Chest wall: There is no dullness to percussion.   Musculoskeletal:      Right lower leg: No edema.      Left lower leg: No edema.   Skin:     General: Skin is warm and dry.   Neurological:      Mental Status: She is alert and oriented to person, place, and time.           Assessment/Plan   Diagnoses and all orders for this visit:    1. Hyperlipidemia, unspecified hyperlipidemia type (Primary)  -     Comprehensive Metabolic Panel  -     Lipid Panel With / Chol / HDL Ratio    2. Palpitations      Advised her to continue metoprolol.

## 2021-01-18 ENCOUNTER — TELEPHONE (OUTPATIENT)
Dept: INTERNAL MEDICINE | Facility: CLINIC | Age: 76
End: 2021-01-18

## 2021-01-18 NOTE — TELEPHONE ENCOUNTER
PATIENT CALLED STATING SHE IS DIZZY THAT STARTED LAST NIGHT. DOES NOT WANT TO COME IN. NO OTHER SYMPTOMS BESIDES DIZZYNESS.     WOULD LIKE TO SPEAK TO NURSE   PLEASE ADVISE [phone]

## 2021-01-25 ENCOUNTER — TELEPHONE (OUTPATIENT)
Dept: INTERNAL MEDICINE | Facility: CLINIC | Age: 76
End: 2021-01-25

## 2021-01-25 NOTE — TELEPHONE ENCOUNTER
PT RECEIVED A LETTER LETTING HER KNOW SHE IS DUE FOR HER MAMMO AFTER MARCH. SHE IS REQUESTING FOR THIS TO BE PUT ION TO SCHEDULE.

## 2021-03-03 DIAGNOSIS — Z23 IMMUNIZATION DUE: ICD-10-CM

## 2021-06-28 DIAGNOSIS — Z12.31 ENCOUNTER FOR SCREENING MAMMOGRAM FOR BREAST CANCER: Primary | ICD-10-CM

## 2021-06-29 ENCOUNTER — OFFICE VISIT (OUTPATIENT)
Dept: INTERNAL MEDICINE | Facility: CLINIC | Age: 76
End: 2021-06-29

## 2021-06-29 ENCOUNTER — APPOINTMENT (OUTPATIENT)
Dept: WOMENS IMAGING | Facility: HOSPITAL | Age: 76
End: 2021-06-29

## 2021-06-29 DIAGNOSIS — Z12.31 ENCOUNTER FOR SCREENING MAMMOGRAM FOR BREAST CANCER: ICD-10-CM

## 2021-06-29 PROCEDURE — 77067 SCR MAMMO BI INCL CAD: CPT | Performed by: INTERNAL MEDICINE

## 2021-06-29 PROCEDURE — 77067 SCR MAMMO BI INCL CAD: CPT | Performed by: RADIOLOGY

## 2021-07-08 ENCOUNTER — OFFICE VISIT (OUTPATIENT)
Dept: INTERNAL MEDICINE | Facility: CLINIC | Age: 76
End: 2021-07-08

## 2021-07-08 VITALS
HEIGHT: 65 IN | WEIGHT: 162 LBS | DIASTOLIC BLOOD PRESSURE: 72 MMHG | OXYGEN SATURATION: 97 % | BODY MASS INDEX: 26.99 KG/M2 | SYSTOLIC BLOOD PRESSURE: 118 MMHG | HEART RATE: 51 BPM | TEMPERATURE: 97.4 F

## 2021-07-08 DIAGNOSIS — E78.5 HYPERLIPIDEMIA, UNSPECIFIED HYPERLIPIDEMIA TYPE: Chronic | ICD-10-CM

## 2021-07-08 DIAGNOSIS — Z00.00 MEDICARE ANNUAL WELLNESS VISIT, SUBSEQUENT: Primary | ICD-10-CM

## 2021-07-08 PROCEDURE — G0439 PPPS, SUBSEQ VISIT: HCPCS | Performed by: INTERNAL MEDICINE

## 2021-07-08 NOTE — PATIENT INSTRUCTIONS
Medicare Wellness  Personal Prevention Plan of Service     Date of Office Visit:  2021  Encounter Provider:  Vicky Smiley MD  Place of Service:  [facility]  Patient Name: Deja Juárez  :  1945    As part of the Medicare Wellness portion of your visit today, we are providing you with this personalized preventive plan of services (PPPS). This plan is based upon recommendations of the [place] Preventive Services Task Force (USPSTF) and the Advisory Committee on Immunization Practices (ACIP).    This lists the preventive care services that should be considered, and provides dates of when you are due. Items listed as completed are up-to-date and do not require any further intervention.    Health Maintenance   Topic Date Due   • COVID-19 Vaccine (1) Never done   • ZOSTER VACCINE (1 of 2) Never done   • ANNUAL WELLNESS VISIT  2021   • COLORECTAL CANCER SCREENING  2021   • INFLUENZA VACCINE  2021   • LIPID PANEL  12/10/2021   • MAMMOGRAM  2022   • DXA SCAN  2022   • TDAP/TD VACCINES (3 - Td or Tdap) 2030   • HEPATITIS C SCREENING  Completed   • Pneumococcal Vaccine 65+  Completed       Orders Placed This Encounter   Procedures   • Comprehensive Metabolic Panel     Order Specific Question:   Release to patient     Answer:   Immediate   • Lipid Panel With / Chol / HDL Ratio     Order Specific Question:   Release to patient     Answer:   Immediate       Return in about 6 months (around 2022) for Follow up, Fasting.

## 2021-07-09 LAB
ALBUMIN SERPL-MCNC: 4.4 G/DL (ref 3.5–5.2)
ALBUMIN/GLOB SERPL: 1.7 G/DL
ALP SERPL-CCNC: 106 U/L (ref 39–117)
ALT SERPL-CCNC: 13 U/L (ref 1–33)
AST SERPL-CCNC: 16 U/L (ref 1–32)
BILIRUB SERPL-MCNC: 0.6 MG/DL (ref 0–1.2)
BUN SERPL-MCNC: 11 MG/DL (ref 8–23)
BUN/CREAT SERPL: 15.7 (ref 7–25)
CALCIUM SERPL-MCNC: 9.9 MG/DL (ref 8.6–10.5)
CHLORIDE SERPL-SCNC: 99 MMOL/L (ref 98–107)
CHOLEST SERPL-MCNC: 242 MG/DL (ref 0–200)
CHOLEST/HDLC SERPL: 5.04 {RATIO}
CO2 SERPL-SCNC: 25.7 MMOL/L (ref 22–29)
CREAT SERPL-MCNC: 0.7 MG/DL (ref 0.57–1)
GLOBULIN SER CALC-MCNC: 2.6 GM/DL
GLUCOSE SERPL-MCNC: 89 MG/DL (ref 65–99)
HDLC SERPL-MCNC: 48 MG/DL (ref 40–60)
LDLC SERPL CALC-MCNC: 166 MG/DL (ref 0–100)
POTASSIUM SERPL-SCNC: 5.1 MMOL/L (ref 3.5–5.2)
PROT SERPL-MCNC: 7 G/DL (ref 6–8.5)
SODIUM SERPL-SCNC: 136 MMOL/L (ref 136–145)
TRIGL SERPL-MCNC: 154 MG/DL (ref 0–150)
VLDLC SERPL CALC-MCNC: 28 MG/DL (ref 5–40)

## 2021-08-19 ENCOUNTER — OFFICE VISIT (OUTPATIENT)
Dept: INTERNAL MEDICINE | Facility: CLINIC | Age: 76
End: 2021-08-19

## 2021-08-19 VITALS
TEMPERATURE: 98.1 F | SYSTOLIC BLOOD PRESSURE: 154 MMHG | HEIGHT: 65 IN | OXYGEN SATURATION: 94 % | WEIGHT: 162 LBS | DIASTOLIC BLOOD PRESSURE: 84 MMHG | RESPIRATION RATE: 19 BRPM | BODY MASS INDEX: 26.99 KG/M2 | HEART RATE: 99 BPM

## 2021-08-19 DIAGNOSIS — H10.9 BACTERIAL CONJUNCTIVITIS: Primary | ICD-10-CM

## 2021-08-19 DIAGNOSIS — R00.2 PALPITATIONS: ICD-10-CM

## 2021-08-19 DIAGNOSIS — I49.3 PVC (PREMATURE VENTRICULAR CONTRACTION): ICD-10-CM

## 2021-08-19 PROCEDURE — 99213 OFFICE O/P EST LOW 20 MIN: CPT | Performed by: NURSE PRACTITIONER

## 2021-08-19 RX ORDER — METOPROLOL SUCCINATE 25 MG/1
TABLET, EXTENDED RELEASE ORAL
Qty: 30 TABLET | Refills: 5 | Status: SHIPPED | OUTPATIENT
Start: 2021-08-19 | End: 2022-06-20 | Stop reason: SDUPTHER

## 2021-08-20 ENCOUNTER — TELEPHONE (OUTPATIENT)
Dept: INTERNAL MEDICINE | Facility: CLINIC | Age: 76
End: 2021-08-20

## 2021-11-30 ENCOUNTER — TELEMEDICINE (OUTPATIENT)
Dept: INTERNAL MEDICINE | Facility: CLINIC | Age: 76
End: 2021-11-30

## 2021-11-30 DIAGNOSIS — U07.1 COVID-19 VIRUS INFECTION: Primary | ICD-10-CM

## 2021-11-30 PROCEDURE — 99213 OFFICE O/P EST LOW 20 MIN: CPT | Performed by: FAMILY MEDICINE

## 2021-11-30 NOTE — PROGRESS NOTES
Patient chose to receive care through a telehealth visit.  She consents to use a video/audio connection for her medical care today.     Assessment and Plan:     Problem List Items Addressed This Visit        Other    COVID-19 virus infection - Primary    Current Assessment & Plan     Spoke with patient, her daughter and  regarding monoclonal antibodies.  She does not want at this time.   Advise to monitor oxygen saturaton, seek medical attention difficulty breathing or oxygen sats drop below 91.    Take Coricidin cold and flu for symptoms.             Relevant Orders    COVID-19,[facility] ROUTINE, NP/OP SWAB IN TRANSPORT MEDIA OR ESWAB 72 HR TAT - Swab, Anterior nasal        Return if symptoms worsen or fail to improve.  Patient was given instructions and counseling regarding her condition or for health maintenance advice. Please see specific information pulled into the AVS if appropriate.      Subjective:   Deja Juárez is a 76 y.o. female being seen on video today via Video Options: Doximity for URI and COVID infection               Social History  She  reports that she quit smoking about 61 years ago. Her smoking use included cigarettes. She has a 5.00 pack-year smoking history. She has never used smokeless tobacco. She reports that she does not drink alcohol and does not use drugs.    History of the Present Illness   HPI  75 yo female present for video visit for acute symptoms.  She is currently having body aches and fatigue. Not feeling as bad as her .  No fevers or chills.  She had a positive home covid test today about 2pm.   Her  possible exposure at  last Friday.      Objective:  Additional parties in room with patient during tele consult:  and daughter  Physical Exam  Constitutional:       General: She is not in acute distress.     Appearance: She is not ill-appearing.   Pulmonary:      Effort: Pulmonary effort is normal.      Comments: Speaking in complete sentences  without distress  Neurological:      Mental Status: She is alert.

## 2021-12-01 ENCOUNTER — TELEPHONE (OUTPATIENT)
Dept: INTERNAL MEDICINE | Facility: CLINIC | Age: 76
End: 2021-12-01

## 2021-12-01 ENCOUNTER — LAB (OUTPATIENT)
Dept: INTERNAL MEDICINE | Facility: CLINIC | Age: 76
End: 2021-12-01

## 2021-12-01 DIAGNOSIS — U07.1 COVID-19 VIRUS INFECTION: ICD-10-CM

## 2021-12-01 NOTE — TELEPHONE ENCOUNTER
Caller: Deja Dean    Relationship: Self    Best call back number: [phone]     What orders are you requesting (i.e. lab or imaging): ANTIBODY INFUSION    In what timeframe would the patient need to come in: ASAP    Where will you receive your lab/imaging services: [facility]    Additional notes: PATIENT CALLING WANTING TO GET THE COVID INFUSION FOR HERSELF AND HER  BOGDAN GOODES

## 2021-12-02 ENCOUNTER — TRANSCRIBE ORDERS (OUTPATIENT)
Dept: ADMINISTRATIVE | Facility: HOSPITAL | Age: 76
End: 2021-12-02

## 2021-12-02 DIAGNOSIS — U07.1 CLINICAL DIAGNOSIS OF SEVERE ACUTE RESPIRATORY SYNDROME CORONAVIRUS 2 (SARS-COV-2) DISEASE: Primary | ICD-10-CM

## 2021-12-02 LAB
LABCORP SARS-COV-2, NAA 2 DAY TAT: NORMAL
SARS-COV-2 RNA RESP QL NAA+PROBE: DETECTED

## 2021-12-03 ENCOUNTER — HOSPITAL ENCOUNTER (OUTPATIENT)
Dept: INFUSION THERAPY | Facility: HOSPITAL | Age: 76
Setting detail: INFUSION SERIES
Discharge: HOME | End: 2021-12-03

## 2021-12-03 VITALS
SYSTOLIC BLOOD PRESSURE: 102 MMHG | RESPIRATION RATE: 18 BRPM | HEART RATE: 55 BPM | DIASTOLIC BLOOD PRESSURE: 58 MMHG | OXYGEN SATURATION: 93 % | TEMPERATURE: 97.7 F

## 2021-12-03 PROCEDURE — M0243 CASIRIVI AND IMDEVI INFUSION: HCPCS | Performed by: FAMILY MEDICINE

## 2021-12-03 PROCEDURE — 25010000002 INJECTION, CASIRIVIMAB AND IMDEVIMAB, 1200 MG: Performed by: FAMILY MEDICINE

## 2021-12-03 RX ORDER — EPINEPHRINE 1 MG/ML
0.3 INJECTION, SOLUTION, CONCENTRATE INTRAVENOUS AS NEEDED
Status: DISCONTINUED | OUTPATIENT
Start: 2021-12-03 | End: 2021-12-05 | Stop reason: HOSPADM

## 2021-12-03 RX ORDER — DIPHENHYDRAMINE HYDROCHLORIDE 50 MG/ML
50 INJECTION INTRAMUSCULAR; INTRAVENOUS ONCE AS NEEDED
Status: DISCONTINUED | OUTPATIENT
Start: 2021-12-03 | End: 2021-12-05 | Stop reason: HOSPADM

## 2021-12-03 RX ORDER — DIPHENHYDRAMINE HCL 50 MG
50 CAPSULE ORAL ONCE AS NEEDED
Status: DISCONTINUED | OUTPATIENT
Start: 2021-12-03 | End: 2021-12-05 | Stop reason: HOSPADM

## 2021-12-03 RX ADMIN — IMDEVIMAB: 300 INJECTION, SOLUTION, CONCENTRATE INTRAVENOUS at 12:48

## 2021-12-03 NOTE — NURSING NOTE
"Pt arrived to [facility] for SQ regeneron per MD order. Pt given handout titled, \"Fact Sheet for Patients, Parents and Caregivers: Emergency Use Authorization of Regen-cov\" prior to administration of SQ regeneron. RN educated pt on injection process, to not rcv any covid vaccine for 90 days, to go to ER for any issues with worsening breathing and to call PCP if symptoms are not improving. Pt vu. Pt denies any questions at this time.     Regeneron given x4 injections consecutively, each at a different injection sites. See MAR for additional administration information. Pt tolerated each injection without any issues. Pt instructed to remain in room for 1 hour for post monitoring period. Call light within reach.    1403- Post monitoring complete. VS obtained and documented. No change in pt assessment. AVS given to pt. Pt escorted to private entrance and discharged in stable condition.     "

## 2022-01-21 ENCOUNTER — OFFICE VISIT (OUTPATIENT)
Dept: INTERNAL MEDICINE | Facility: CLINIC | Age: 77
End: 2022-01-21

## 2022-01-21 VITALS
DIASTOLIC BLOOD PRESSURE: 84 MMHG | WEIGHT: 165 LBS | TEMPERATURE: 97.7 F | SYSTOLIC BLOOD PRESSURE: 128 MMHG | HEIGHT: 65 IN | BODY MASS INDEX: 27.49 KG/M2 | OXYGEN SATURATION: 98 % | HEART RATE: 74 BPM

## 2022-01-21 DIAGNOSIS — F41.9 ANXIETY: ICD-10-CM

## 2022-01-21 DIAGNOSIS — K21.9 GASTROESOPHAGEAL REFLUX DISEASE, UNSPECIFIED WHETHER ESOPHAGITIS PRESENT: ICD-10-CM

## 2022-01-21 DIAGNOSIS — E78.5 HYPERLIPIDEMIA, UNSPECIFIED HYPERLIPIDEMIA TYPE: Chronic | ICD-10-CM

## 2022-01-21 DIAGNOSIS — Z76.89 ENCOUNTER TO ESTABLISH CARE: Primary | ICD-10-CM

## 2022-01-21 DIAGNOSIS — J32.1 FRONTAL SINUSITIS, UNSPECIFIED CHRONICITY: ICD-10-CM

## 2022-01-21 PROCEDURE — 99214 OFFICE O/P EST MOD 30 MIN: CPT | Performed by: FAMILY MEDICINE

## 2022-01-21 RX ORDER — SERTRALINE HYDROCHLORIDE 25 MG/1
25 TABLET, FILM COATED ORAL DAILY
Qty: 30 TABLET | Refills: 3 | Status: SHIPPED | OUTPATIENT
Start: 2022-01-21 | End: 2023-01-23

## 2022-01-21 RX ORDER — FLUTICASONE PROPIONATE 50 MCG
2 SPRAY, SUSPENSION (ML) NASAL DAILY
Qty: 16 G | Refills: 1 | Status: SHIPPED | OUTPATIENT
Start: 2022-01-21 | End: 2022-08-15

## 2022-01-21 RX ORDER — AZITHROMYCIN 250 MG/1
TABLET, FILM COATED ORAL
Qty: 6 TABLET | Refills: 0 | Status: SHIPPED | OUTPATIENT
Start: 2022-01-21 | End: 2022-01-26

## 2022-01-21 NOTE — PROGRESS NOTES
"Chief Complaint  Establish Care (New Patient)    Subjective    History of Present Illness {CC  Problem List  Visit  Diagnosis   Encounters  Notes  Medications  Labs  Result Review Imaging  Media :23}     Deja Juárez presents to [facility] to establish care.   History of Present Illness   76 yo female present for new patient visit. Pt is new to me, previously seeing Dr. Smiley. She has a history of hyperlipidemia, palpitations, Gerd, and arthritis.   She is post covid infection last month, s/p monoclonal antibody treatment.   GERD taking pepcid Ac which helps a lot, taking medication everyday.      For the last two weeks she has had increase congestion, sinus pressure, headache, and blowing nose a lot. Most recently green mucus.    She has taken some mucinex but not improving.  No fever or shortness of breath.        Social History     Socioeconomic History   • Marital status:    Tobacco Use   • Smoking status: Former Smoker     Packs/day: 1.00     Years: 5.00     Pack years: 5.00     Types: Cigarettes     Quit date: 1960     Years since quittin.0   • Smokeless tobacco: Never Used   • Tobacco comment: CAFFEINE USE   Substance and Sexual Activity   • Alcohol use: No   • Drug use: No       Objective     Vital Signs:   /84 (BP Location: Left arm, Patient Position: Sitting, Cuff Size: Adult)   Pulse 74   Temp 97.7 °F (36.5 °C)   Ht 165.1 cm (65\")   Wt 74.8 kg (165 lb)   SpO2 98%   BMI 27.46 kg/m²      Physical Exam  Constitutional:       General: She is not in acute distress.     Appearance: She is not ill-appearing.   HENT:      Head: Normocephalic.      Right Ear: Tympanic membrane normal.      Left Ear: Tympanic membrane normal.      Nose: Congestion present.      Comments: Frontal sinus tenderness  Eyes:      Conjunctiva/sclera: Conjunctivae normal.   Cardiovascular:      Rate and Rhythm: Regular rhythm.      Heart sounds: Normal heart sounds. "   Pulmonary:      Effort: No respiratory distress.      Breath sounds: Normal breath sounds. No wheezing.   Musculoskeletal:      Right lower leg: No edema.      Left lower leg: No edema.   Neurological:      Mental Status: She is alert.        Result Review  Data Reviewed:{ Labs  Result Review  Imaging  Med Tab  Media :23}   The following data was reviewed by: Stephanie Waller MD on 01/21/2022  Lab Results - Last 18 Months   Lab Units 07/08/21  1020 12/10/20  1021   BUN mg/dL 11 14   CREATININE mg/dL 0.70 0.71   EGFR IF NONAFRICN AM mL/min/1.73 82 80   EGFR IF AFRICN AM mL/min/1.73 99 98   SODIUM mmol/L 136 137   POTASSIUM mmol/L 5.1 4.6   CHLORIDE mmol/L 99 100   CALCIUM mg/dL 9.9 9.6   ALBUMIN g/dL 4.40 4.40   BILIRUBIN mg/dL 0.6 0.8   ALK PHOS U/L 106 104   AST (SGOT) U/L 16 15   ALT (SGPT) U/L 13 15   CHOLESTEROL mg/dL 242* 253*   TRIGLYCERIDES mg/dL 154* 173*   HDL CHOL mg/dL 48 58   VLDL CHOLESTEROL RATNA mg/dL 28 31   LDL CHOL mg/dL 166* 164*            Current Outpatient Medications:   •  Cholecalciferol (VITAMIN D) 1000 UNITS tablet, Take 1,000 Units by mouth., Disp: , Rfl:   •  metoprolol succinate XL (TOPROL-XL) 25 MG 24 hr tablet, TAKE ONE TABLET BY MOUTH DAILY, Disp: 30 tablet, Rfl: 5  •  Multiple Vitamin (MULTIVITAMIN) tablet, Take 1 tablet by mouth Daily., Disp: , Rfl:   •  azithromycin (Zithromax Z-Luis) 250 MG tablet, Take 2 tablets the first day, then 1 tablet daily for 4 days., Disp: 6 tablet, Rfl: 0  •  fluticasone (Flonase) 50 MCG/ACT nasal spray, 2 sprays into the nostril(s) as directed by provider Daily., Disp: 16 g, Rfl: 1  •  sertraline (Zoloft) 25 MG tablet, Take 1 tablet by mouth Daily., Disp: 30 tablet, Rfl: 3     Assessment and Plan {CC Problem List  Visit Diagnosis  ROS  Review (Popup)  Health Maintenance  Quality  BestPractice  Medications  SmartSets  SnapShot Encounters  Media :23}   Problem List Items Addressed This Visit        Cardiac and Vasculature    Hyperlipidemia  (Chronic)    Current Assessment & Plan     Lipid abnormalities are chronic.  Pt states she has tried atorvastatin previously, pills too big so she stopped taking. recheck lipid panel today. Pt is ok with medication if needed, if cholesterol is not controlled.  Lipids will be reassessed in 6 months.         Relevant Orders    Comprehensive metabolic panel    Lipid panel       Gastrointestinal Abdominal     GERD (gastroesophageal reflux disease)    Current Assessment & Plan     Chronic condition, taking priolsec otc daily   Controlled no issues a this time.             Mental Health    Anxiety    Current Assessment & Plan     Feels like it is worsening.   She use to travel a lot but afraid in the car more.   States always had anxiety but feels like it is worse.  Worrying about things all the time and wanting to do for everyone.   Has had medication in past, but controlled, does not want controlled medication.   Prescribed sertaline to help with anxiety. Follow up in 6 weeks via video visit to see if increase dosage is needed.          Relevant Medications    sertraline (Zoloft) 25 MG tablet      Other Visit Diagnoses     Encounter to establish care    -  Primary    Frontal sinusitis, unspecified chronicity        Relevant Medications    azithromycin (Zithromax Z-Luis) 250 MG tablet    fluticasone (Flonase) 50 MCG/ACT nasal spray          Follow Up   Return in about 6 weeks (around 3/4/2022) for Recheck anxiety, Video visit.  Patient was given instructions and counseling regarding her condition or for health maintenance advice. Please see specific information pulled into the AVS if appropriate.    EpicAct:_ARVIN_AMB_ORDERS,RunParams:STARTUPTYPE=FOLLOW    [email or web address]_DISCHARGE

## 2022-01-21 NOTE — ASSESSMENT & PLAN NOTE
Lipid abnormalities are chronic.  Pt states she has tried atorvastatin previously, pills too big so she stopped taking. recheck lipid panel today. Pt is ok with medication if needed, if cholesterol is not controlled.  Lipids will be reassessed in 6 months.

## 2022-01-21 NOTE — ASSESSMENT & PLAN NOTE
Feels like it is worsening.   She use to travel a lot but afraid in the car more.   States always had anxiety but feels like it is worse.  Worrying about things all the time and wanting to do for everyone.   Has had medication in past, but controlled, does not want controlled medication.   Prescribed sertaline to help with anxiety. Follow up in 6 weeks via video visit to see if increase dosage is needed.

## 2022-01-22 LAB
ALBUMIN SERPL-MCNC: 4.5 G/DL (ref 3.7–4.7)
ALBUMIN/GLOB SERPL: 1.8 {RATIO} (ref 1.2–2.2)
ALP SERPL-CCNC: 88 IU/L (ref 44–121)
ALT SERPL-CCNC: 14 IU/L (ref 0–32)
AST SERPL-CCNC: 16 IU/L (ref 0–40)
BILIRUB SERPL-MCNC: 0.8 MG/DL (ref 0–1.2)
BUN SERPL-MCNC: 15 MG/DL (ref 8–27)
BUN/CREAT SERPL: 21 (ref 12–28)
CALCIUM SERPL-MCNC: 9.7 MG/DL (ref 8.7–10.3)
CHLORIDE SERPL-SCNC: 99 MMOL/L (ref 96–106)
CHOLEST SERPL-MCNC: 255 MG/DL (ref 100–199)
CO2 SERPL-SCNC: 24 MMOL/L (ref 20–29)
CREAT SERPL-MCNC: 0.72 MG/DL (ref 0.57–1)
GLOBULIN SER CALC-MCNC: 2.5 G/DL (ref 1.5–4.5)
GLUCOSE SERPL-MCNC: 107 MG/DL (ref 65–99)
HDLC SERPL-MCNC: 59 MG/DL
LDLC SERPL CALC-MCNC: 165 MG/DL (ref 0–99)
POTASSIUM SERPL-SCNC: 4.7 MMOL/L (ref 3.5–5.2)
PROT SERPL-MCNC: 7 G/DL (ref 6–8.5)
SODIUM SERPL-SCNC: 137 MMOL/L (ref 134–144)
TRIGL SERPL-MCNC: 172 MG/DL (ref 0–149)
VLDLC SERPL CALC-MCNC: 31 MG/DL (ref 5–40)

## 2022-01-27 RX ORDER — ROSUVASTATIN CALCIUM 5 MG/1
5 TABLET, COATED ORAL DAILY
Qty: 30 TABLET | Refills: 3 | Status: SHIPPED | OUTPATIENT
Start: 2022-01-27 | End: 2022-08-22

## 2022-02-07 ENCOUNTER — TELEPHONE (OUTPATIENT)
Dept: INTERNAL MEDICINE | Facility: CLINIC | Age: 77
End: 2022-02-07

## 2022-02-07 NOTE — TELEPHONE ENCOUNTER
Caller: Deja Juárez    Relationship: Self    Best call back number: [phone]     Caller requesting test results:     What test was performed: BLOOD WORK     When was the test performed: 01/21/22    Where was the test performed: IN OFFICE    Additional notes: PATIENT CALLING WANTING TO KNOW THE RESULTS OF HER TEST SHE WOULD ALSO LIKE TO HAVE A COPY MAILED TO HER TO THE ADDRESS ON FILE

## 2022-02-25 ENCOUNTER — TELEPHONE (OUTPATIENT)
Dept: INTERNAL MEDICINE | Facility: CLINIC | Age: 77
End: 2022-02-25

## 2022-02-25 NOTE — TELEPHONE ENCOUNTER
Caller: Deja Juárez    Relationship: Self    Best call back number: [phone]    What was the call regarding: PATIENT HAD TO CANCEL HER APPOINTMENT ON 03/04/2022 DUE TO BEING OUT OF TOWN THAT DAY. PATIENT WANTED TO INFORM DR PUCKETT THAT HER TWO MEDICATIONS, rosuvastatin (Crestor) 5 MG tablet AND sertraline (Zoloft) 25 MG tablet HAVE BEEN WORKING FINE AND THAT SHE IS FEELING OKAY ON THEM. PATIENT STATED THAT UNLESS SHE GETS SICK, SHE WILL BE IN TO SEE DR PUCKETT AT HER NEXT APPOINTMENT ON 08/23/2022.     Do you require a callback: IF ANY QUESTIONS

## 2022-06-14 ENCOUNTER — OFFICE VISIT (OUTPATIENT)
Dept: CARDIOLOGY | Facility: CLINIC | Age: 77
End: 2022-06-14

## 2022-06-14 VITALS
SYSTOLIC BLOOD PRESSURE: 138 MMHG | DIASTOLIC BLOOD PRESSURE: 88 MMHG | BODY MASS INDEX: 27.32 KG/M2 | HEIGHT: 65 IN | WEIGHT: 164 LBS | HEART RATE: 78 BPM

## 2022-06-14 DIAGNOSIS — Z01.810 PREOP CARDIOVASCULAR EXAM: ICD-10-CM

## 2022-06-14 DIAGNOSIS — E78.5 HYPERLIPIDEMIA, UNSPECIFIED HYPERLIPIDEMIA TYPE: Chronic | ICD-10-CM

## 2022-06-14 DIAGNOSIS — R94.31 ABNORMAL ECG: ICD-10-CM

## 2022-06-14 DIAGNOSIS — R00.2 PALPITATIONS: Primary | ICD-10-CM

## 2022-06-14 PROCEDURE — 93000 ELECTROCARDIOGRAM COMPLETE: CPT | Performed by: INTERNAL MEDICINE

## 2022-06-14 PROCEDURE — 99214 OFFICE O/P EST MOD 30 MIN: CPT | Performed by: INTERNAL MEDICINE

## 2022-06-14 NOTE — PROGRESS NOTES
Date of Office Visit: 2022  Encounter Provider: Lucy Garcia MD  Place of Service: [facility] CARDIOLOGY  Patient Name: Deja Juárez  :1945      Patient ID:  Deja Juárez is a 77 y.o. female is here for  followup for preoperative evaluation.        History of Present Illness    Deja Juárez is here today as a preoperative evaluation for hip surgery scheduled 2022 with Dr. Vo.  She had a left knee replacement performed by Dr. Vo in  and then a right knee replacement in .     She has anxiety and depression, hyperlipidemia and a history of scoliosis.      She quit smoking 47 years ago.  She is  and lives with her .  She is retired.  She has two grandchildren.  She drinks one cup of coffee per day.  No alcohol or drugs.       Diabetes is in her family in her father as well as hypertension being present in her mother and father.       She had an echocardiogram done 2/10/2017 showing ejection fraction 69% with grade 2 diastolic dysfunction, mild mitral insufficiency.  He had nonischemic stress nuclear perfusion study done 2017.  She had an echocardiogram done 3/4/2019 showing ejection fraction 57% with grade 1 diastolic dysfunction, mild mitral insufficiency, thickened aortic valve.  She had a Holter monitor done 2017 showing PVCs and PACs, one nonsustained run of atrial tachycardia otherwise normal sinus rhythm.  There were no pauses or high degree AV block.  This work-up was done for palpitations.  She is placed on metoprolol and has had no further palpitations.    Echo done 3/4/2019 showed ejection fraction 57% with grade 1 diastolic dysfunction, thickening of the aortic valve without insufficiency or stenosis, mild mitral insufficiency, aortic root sclerosis without dilation.  24-hour Holter done 3/4/2019 showed frequent unifocal PVCs with no ventricular tachycardia and occasional unifocal PACs without atrial  fibrillation.     Labs in 22 showed glucose 107, otherwise normal CMP, total cholesterol 255, HDL 59, , triglycerides 172.    She was started recently on rosuvastatin for hyperlipidemia.  She has a chronically abnormal ECG.  She has no exertional chest tightness or pressure.  She is fatigued because of her hip pain.  She broke her pelvis over COVID.  She has no heart racing or skipping, dizziness or syncope.  She has no orthopnea or PND.  She has no exertional dyspnea.  She is taking her medications as directed without difficulty.    Past Medical History:   Diagnosis Date   • Anxiety    • Depression    • GERD (gastroesophageal reflux disease)    • Hyperlipidemia    • Hypertension    • Scoliosis          Past Surgical History:   Procedure Laterality Date   • EYE SURGERY Left 2019q   • JOINT REPLACEMENT      bilateral knee replacement   • LIPOMA EXCISION      from shoulder   • TOTAL KNEE ARTHROPLASTY Left 2017       Current Outpatient Medications on File Prior to Visit   Medication Sig Dispense Refill   • Cholecalciferol (VITAMIN D) 1000 UNITS tablet Take 1,000 Units by mouth.     • metoprolol succinate XL (TOPROL-XL) 25 MG 24 hr tablet TAKE ONE TABLET BY MOUTH DAILY 30 tablet 5   • Multiple Vitamin (MULTIVITAMIN) tablet Take 1 tablet by mouth Daily.     • rosuvastatin (Crestor) 5 MG tablet Take 1 tablet by mouth Daily. 30 tablet 3   • sertraline (Zoloft) 25 MG tablet Take 1 tablet by mouth Daily. 30 tablet 3   • fluticasone (Flonase) 50 MCG/ACT nasal spray 2 sprays into the nostril(s) as directed by provider Daily. 16 g 1     No current facility-administered medications on file prior to visit.       Social History     Socioeconomic History   • Marital status:    Tobacco Use   • Smoking status: Former Smoker     Packs/day: 1.00     Years: 5.00     Pack years: 5.00     Types: Cigarettes     Quit date: 1960     Years since quittin.4   • Smokeless tobacco: Never Used   • Tobacco comment:  "CAFFEINE USE   Substance and Sexual Activity   • Alcohol use: No   • Drug use: No           ROS    Procedures    ECG 12 Lead    Date/Time: 6/14/2022 10:18 AM  Performed by: Lucy Garcia MD  Authorized by: Lucy Garcia MD   Comparison: compared with previous ECG   Similar to previous ECG  Rhythm: sinus rhythm  T inversion: I, aVL, V2, V3, V4 and V5  T flattening: V6    Clinical impression: abnormal EKG                Objective:      Vitals:    06/14/22 1008   BP: 138/88   Pulse: 78   Weight: 74.4 kg (164 lb)   Height: 165.1 cm (65\")     Body mass index is 27.29 kg/m².    Vitals reviewed.   Constitutional:       General: Not in acute distress.     Appearance: Well-developed. Not diaphoretic.   Eyes:      General: No scleral icterus.     Conjunctiva/sclera: Conjunctivae normal.   HENT:      Head: Normocephalic and atraumatic.   Neck:      Thyroid: No thyromegaly.      Vascular: No carotid bruit or JVD.      Lymphadenopathy: No cervical adenopathy.   Pulmonary:      Effort: Pulmonary effort is normal. No respiratory distress.      Breath sounds: Normal breath sounds. No wheezing. No rhonchi. No rales.   Chest:      Chest wall: Not tender to palpatation.   Cardiovascular:      Normal rate. Regular rhythm.      Murmurs: There is no murmur.      No gallop.   Pulses:     Intact distal pulses.   Edema:     Peripheral edema absent.   Abdominal:      General: Bowel sounds are normal. There is no distension or abdominal bruit.      Palpations: Abdomen is soft. There is no abdominal mass.      Tenderness: There is no abdominal tenderness.   Musculoskeletal:         General: No deformity.      Extremities: No clubbing present.     Cervical back: Neck supple. Skin:     General: Skin is warm and dry. There is no cyanosis.      Coloration: Skin is not pale.      Findings: No rash.   Neurological:      Mental Status: Alert and oriented to person, place, and time.      Cranial Nerves: No cranial nerve deficit. "   Psychiatric:         Judgment: Judgment normal.         Lab Review:       Assessment:      Diagnosis Plan   1. Palpitations     2. Hyperlipidemia, unspecified hyperlipidemia type  ECG 12 Lead    CT Cardiac Calcium Score Without Dye   3. Preop cardiovascular exam  ECG 12 Lead   4. Abnormal ECG  ECG 12 Lead    CT Cardiac Calcium Score Without Dye         1.        Preoperative evaluation prior to right hip surgery scheduled 7/25/2022.  2.         Abnormal EKG with anterior T-wave inversions.  stable.   3.          PACs and PVCs, treated with metoprolol.  She was having palpitations.  She does not have these anymore.  4.         Hyperlipidemia.  On rosuvastatin, started recently.        Plan:       See Natalia in 1 year, set up coronary calcium score.  No medication changes at this time.

## 2022-06-20 DIAGNOSIS — R00.2 PALPITATIONS: ICD-10-CM

## 2022-06-20 DIAGNOSIS — I49.3 PVC (PREMATURE VENTRICULAR CONTRACTION): ICD-10-CM

## 2022-06-20 RX ORDER — METOPROLOL SUCCINATE 25 MG/1
25 TABLET, EXTENDED RELEASE ORAL DAILY
Qty: 30 TABLET | Refills: 2 | Status: SHIPPED | OUTPATIENT
Start: 2022-06-20 | End: 2022-06-30 | Stop reason: SDUPTHER

## 2022-06-29 ENCOUNTER — TELEPHONE (OUTPATIENT)
Dept: INTERNAL MEDICINE | Facility: CLINIC | Age: 77
End: 2022-06-29

## 2022-06-29 ENCOUNTER — OFFICE VISIT (OUTPATIENT)
Dept: INTERNAL MEDICINE | Facility: CLINIC | Age: 77
End: 2022-06-29

## 2022-06-29 VITALS
SYSTOLIC BLOOD PRESSURE: 142 MMHG | HEART RATE: 65 BPM | BODY MASS INDEX: 27.82 KG/M2 | TEMPERATURE: 97.1 F | RESPIRATION RATE: 17 BRPM | OXYGEN SATURATION: 96 % | HEIGHT: 65 IN | WEIGHT: 167 LBS | DIASTOLIC BLOOD PRESSURE: 76 MMHG

## 2022-06-29 DIAGNOSIS — R35.0 URINARY FREQUENCY: Primary | ICD-10-CM

## 2022-06-29 DIAGNOSIS — Z01.818 PREOPERATIVE CLEARANCE: ICD-10-CM

## 2022-06-29 LAB
BILIRUB UR QL STRIP: NEGATIVE
CLARITY UR: CLEAR
COLOR UR: YELLOW
GLUCOSE UR STRIP-MCNC: NEGATIVE MG/DL
HGB UR QL STRIP.AUTO: NEGATIVE
KETONES UR QL STRIP: NEGATIVE
LEUKOCYTE ESTERASE UR QL STRIP.AUTO: NEGATIVE
NITRITE UR QL STRIP: NEGATIVE
PH UR STRIP.AUTO: 5.5 [PH] (ref 5–8)
PROT UR QL STRIP: NEGATIVE
SP GR UR STRIP: <=1.005 (ref 1–1.03)
UROBILINOGEN UR QL STRIP: NORMAL

## 2022-06-29 PROCEDURE — 99213 OFFICE O/P EST LOW 20 MIN: CPT | Performed by: FAMILY MEDICINE

## 2022-06-29 PROCEDURE — 81003 URINALYSIS AUTO W/O SCOPE: CPT | Performed by: FAMILY MEDICINE

## 2022-06-29 RX ORDER — FAMOTIDINE 20 MG/1
20 TABLET, FILM COATED ORAL
COMMUNITY

## 2022-06-29 RX ORDER — IBUPROFEN 200 MG
1 CAPSULE ORAL DAILY
COMMUNITY

## 2022-06-29 NOTE — PROGRESS NOTES
"  Chief Complaint  Surgical Clearance (RIGHT HIP REPLACEMENT)    Subjective    History of Present Illness {CC  Problem List  Visit  Diagnosis   Encounters  Notes  Medications  Labs  Result Review Imaging  Media :23}     Deja Juárez presents to [facility] for   History of Present Illness     76 yo female present for pre-op clearance appt. She states will have R hip replacement on 2022 with Dr. William Vo.    She has seen cardiology Dr. Garcia. EKG performed at that time clear.   CMP and CBC completed at [facility].     She has notice some increase urinary frequency.    Social History     Socioeconomic History   • Marital status:    Tobacco Use   • Smoking status: Former Smoker     Packs/day: 1.00     Years: 5.00     Pack years: 5.00     Types: Cigarettes     Quit date: 1960     Years since quittin.5   • Smokeless tobacco: Never Used   • Tobacco comment: CAFFEINE USE   Substance and Sexual Activity   • Alcohol use: No   • Drug use: No      Objective     Vital Signs:   /76 (BP Location: Right arm, Patient Position: Sitting, Cuff Size: Adult)   Pulse 65   Temp 97.1 °F (36.2 °C) (Temporal)   Resp 17   Ht 165.1 cm (65\")   Wt 75.8 kg (167 lb)   SpO2 96%   BMI 27.79 kg/m²   Physical Exam  Constitutional:       General: She is not in acute distress.     Comments: Walking with cane   HENT:      Head: Normocephalic.   Eyes:      Conjunctiva/sclera: Conjunctivae normal.   Cardiovascular:      Rate and Rhythm: Regular rhythm.      Heart sounds: Normal heart sounds.   Pulmonary:      Effort: No respiratory distress.      Breath sounds: Normal breath sounds. No wheezing.   Musculoskeletal:      Right lower leg: No edema.      Left lower leg: No edema.   Neurological:      Mental Status: She is alert.        Result Review  Data Reviewed:{ Labs  Result Review  Imaging  Med Tab  Media :23}   The following data was reviewed by: Stephanie MILLIGAN" MD Sridhar on 06/29/2022  Lab Results - Last 18 Months   Lab Units 06/23/22  1001 01/21/22  0855 07/08/21  1020   BUN mg/dL  --  15 11   CREATININE mg/dL  --  0.72 0.70   EGFR IF NONAFRICN AM mL/min/1.73  --  82 82   EGFR IF AFRICN AM mL/min/1.73  --  94 99   SODIUM mmol/L  --  137 136   POTASSIUM mmol/L  --  4.7 5.1   CHLORIDE mmol/L  --  99 99   CALCIUM mg/dL  --  9.7 9.9   ALBUMIN g/dL  --  4.5 4.40   BILIRUBIN mg/dL  --  0.8 0.6   ALK PHOS IU/L  --  88 106   AST (SGOT) IU/L  --  16 16   ALT (SGPT) IU/L  --  14 13   CHOLESTEROL mg/dL  --  255* 242*   TRIGLYCERIDES mg/dL  --  172* 154*   HDL CHOL mg/dL  --  59 48   VLDL CHOLESTEROL RATNA mg/dL  --  31 28   LDL CHOL mg/dL  --  165* 166*   WBC 10*3/uL 7.31  --   --    RBC 10*6/uL 4.75  --   --    HEMATOCRIT % 42.9  --   --    MCV fL 90.3  --   --    MCH pg 28.4  --   --               Current Outpatient Medications:   •  calcium citrate (CALCITRATE) 950 (200 Ca) MG tablet, Take 1 tablet by mouth Daily., Disp: , Rfl:   •  Cholecalciferol (VITAMIN D) 1000 UNITS tablet, Take 1,000 Units by mouth., Disp: , Rfl:   •  famotidine (PEPCID) 20 MG tablet, Take 20 mg by mouth., Disp: , Rfl:   •  metoprolol succinate XL (TOPROL-XL) 25 MG 24 hr tablet, Take 1 tablet by mouth Daily., Disp: 30 tablet, Rfl: 2  •  Multiple Vitamin (MULTIVITAMIN) tablet, Take 1 tablet by mouth Daily., Disp: , Rfl:   •  [START ON 7/20/2022] mupirocin (BACTROBAN) 2 % ointment, Apply 1 application topically to the appropriate area as directed. NOT STARTED YET. APPEARS TO BE FOR 7/25/22 SURGERY, Disp: , Rfl:   •  rosuvastatin (Crestor) 5 MG tablet, Take 1 tablet by mouth Daily., Disp: 30 tablet, Rfl: 3  •  sertraline (Zoloft) 25 MG tablet, Take 1 tablet by mouth Daily., Disp: 30 tablet, Rfl: 3  •  fluticasone (Flonase) 50 MCG/ACT nasal spray, 2 sprays into the nostril(s) as directed by provider Daily., Disp: 16 g, Rfl: 1      Assessment and Plan {CC Problem List  Visit Diagnosis  ROS  Review (Popup)   Health Maintenance  Quality  BestPractice  Medications  SmartSets  SnapShot Encounters  Media :23}   Problem List Items Addressed This Visit    None     Visit Diagnoses     Urinary frequency    -  Primary    Relevant Orders    Urinalysis With Culture If Indicated - Urine, Clean Catch    Preoperative clearance            Concern urinary frequency. UA ordered to rule out UTI. If positive will need to repeat prior to surgery to confirm cleared.   Reviewed Cardiology Notes from Dr. Garcia  Review lab report from Psychiatric.   Clearance for surgery pending urine studies results.          Follow Up   Return if symptoms worsen or fail to improve.  Patient was given instructions and counseling regarding her condition or for health maintenance advice. Please see specific information pulled into the AVS if appropriate.    EpicAct:[email or web address]_ORDERS,RunParams:STARTUPTYPE=FOLLOW    [email or web address]_DISCHARGE

## 2022-06-30 DIAGNOSIS — I49.3 PVC (PREMATURE VENTRICULAR CONTRACTION): ICD-10-CM

## 2022-06-30 DIAGNOSIS — R00.2 PALPITATIONS: ICD-10-CM

## 2022-06-30 RX ORDER — METOPROLOL SUCCINATE 25 MG/1
25 TABLET, EXTENDED RELEASE ORAL DAILY
Qty: 90 TABLET | Refills: 1 | Status: SHIPPED | OUTPATIENT
Start: 2022-06-30 | End: 2022-08-15 | Stop reason: SDUPTHER

## 2022-07-06 ENCOUNTER — TELEPHONE (OUTPATIENT)
Dept: CARDIOLOGY | Facility: CLINIC | Age: 77
End: 2022-07-06

## 2022-07-06 NOTE — TELEPHONE ENCOUNTER
Dr. Vo's office called to see if pt was cleared for surgery    Dr. Vo:  FAX: [phone]  PHONE: [phone]

## 2022-07-07 NOTE — TELEPHONE ENCOUNTER
This has a form that she filled out and it was faxed at 13:12 yesterday with confirmation that they got it.    Juan M

## 2022-08-06 ENCOUNTER — APPOINTMENT (OUTPATIENT)
Dept: GENERAL RADIOLOGY | Facility: HOSPITAL | Age: 77
End: 2022-08-06

## 2022-08-06 ENCOUNTER — HOSPITAL ENCOUNTER (EMERGENCY)
Facility: HOSPITAL | Age: 77
Discharge: HOME | End: 2022-08-06
Attending: EMERGENCY MEDICINE | Admitting: EMERGENCY MEDICINE

## 2022-08-06 ENCOUNTER — APPOINTMENT (OUTPATIENT)
Dept: CT IMAGING | Facility: HOSPITAL | Age: 77
End: 2022-08-06

## 2022-08-06 VITALS
OXYGEN SATURATION: 96 % | RESPIRATION RATE: 20 BRPM | TEMPERATURE: 98.2 F | SYSTOLIC BLOOD PRESSURE: 116 MMHG | WEIGHT: 165 LBS | DIASTOLIC BLOOD PRESSURE: 68 MMHG | HEIGHT: 65 IN | HEART RATE: 77 BPM | BODY MASS INDEX: 27.49 KG/M2

## 2022-08-06 DIAGNOSIS — I48.0 PAROXYSMAL ATRIAL FIBRILLATION: Primary | ICD-10-CM

## 2022-08-06 LAB
ALBUMIN SERPL-MCNC: 3.9 G/DL (ref 3.5–5.2)
ALBUMIN/GLOB SERPL: 1.3 G/DL
ALP SERPL-CCNC: 139 U/L (ref 39–117)
ALT SERPL W P-5'-P-CCNC: 15 U/L (ref 1–33)
ANION GAP SERPL CALCULATED.3IONS-SCNC: 14.9 MMOL/L (ref 5–15)
AST SERPL-CCNC: 17 U/L (ref 1–32)
BASOPHILS # BLD AUTO: 0.05 10*3/MM3 (ref 0–0.2)
BASOPHILS NFR BLD AUTO: 0.4 % (ref 0–1.5)
BILIRUB SERPL-MCNC: 0.3 MG/DL (ref 0–1.2)
BUN SERPL-MCNC: 12 MG/DL (ref 8–23)
BUN/CREAT SERPL: 16.9 (ref 7–25)
CALCIUM SPEC-SCNC: 9.1 MG/DL (ref 8.6–10.5)
CHLORIDE SERPL-SCNC: 98 MMOL/L (ref 98–107)
CO2 SERPL-SCNC: 26.1 MMOL/L (ref 22–29)
CREAT SERPL-MCNC: 0.71 MG/DL (ref 0.57–1)
DEPRECATED RDW RBC AUTO: 38.8 FL (ref 37–54)
EGFRCR SERPLBLD CKD-EPI 2021: 88.2 ML/MIN/1.73
EOSINOPHIL # BLD AUTO: 0.19 10*3/MM3 (ref 0–0.4)
EOSINOPHIL NFR BLD AUTO: 1.5 % (ref 0.3–6.2)
ERYTHROCYTE [DISTWIDTH] IN BLOOD BY AUTOMATED COUNT: 12.5 % (ref 12.3–15.4)
GLOBULIN UR ELPH-MCNC: 2.9 GM/DL
GLUCOSE SERPL-MCNC: 130 MG/DL (ref 65–99)
HCT VFR BLD AUTO: 27.2 % (ref 34–46.6)
HGB BLD-MCNC: 9 G/DL (ref 12–15.9)
HOLD SPECIMEN: NORMAL
HOLD SPECIMEN: NORMAL
IMM GRANULOCYTES # BLD AUTO: 0.24 10*3/MM3 (ref 0–0.05)
IMM GRANULOCYTES NFR BLD AUTO: 1.9 % (ref 0–0.5)
LYMPHOCYTES # BLD AUTO: 2.39 10*3/MM3 (ref 0.7–3.1)
LYMPHOCYTES NFR BLD AUTO: 18.5 % (ref 19.6–45.3)
MAGNESIUM SERPL-MCNC: 2.1 MG/DL (ref 1.6–2.4)
MCH RBC QN AUTO: 28.7 PG (ref 26.6–33)
MCHC RBC AUTO-ENTMCNC: 33.1 G/DL (ref 31.5–35.7)
MCV RBC AUTO: 86.6 FL (ref 79–97)
MONOCYTES # BLD AUTO: 0.85 10*3/MM3 (ref 0.1–0.9)
MONOCYTES NFR BLD AUTO: 6.6 % (ref 5–12)
NEUTROPHILS NFR BLD AUTO: 71.1 % (ref 42.7–76)
NEUTROPHILS NFR BLD AUTO: 9.21 10*3/MM3 (ref 1.7–7)
NRBC BLD AUTO-RTO: 0 /100 WBC (ref 0–0.2)
NT-PROBNP SERPL-MCNC: 1032 PG/ML (ref 0–1800)
PLATELET # BLD AUTO: 532 10*3/MM3 (ref 140–450)
PMV BLD AUTO: 9 FL (ref 6–12)
POTASSIUM SERPL-SCNC: 3.6 MMOL/L (ref 3.5–5.2)
PROT SERPL-MCNC: 6.8 G/DL (ref 6–8.5)
QT INTERVAL: 373 MS
RBC # BLD AUTO: 3.14 10*6/MM3 (ref 3.77–5.28)
SODIUM SERPL-SCNC: 139 MMOL/L (ref 136–145)
TROPONIN T SERPL-MCNC: <0.01 NG/ML (ref 0–0.03)
WBC NRBC COR # BLD: 12.93 10*3/MM3 (ref 3.4–10.8)
WHOLE BLOOD HOLD COAG: NORMAL
WHOLE BLOOD HOLD SPECIMEN: NORMAL

## 2022-08-06 PROCEDURE — 96374 THER/PROPH/DIAG INJ IV PUSH: CPT

## 2022-08-06 PROCEDURE — 0 IOPAMIDOL PER 1 ML: Performed by: EMERGENCY MEDICINE

## 2022-08-06 PROCEDURE — 85025 COMPLETE CBC W/AUTO DIFF WBC: CPT | Performed by: EMERGENCY MEDICINE

## 2022-08-06 PROCEDURE — 93010 ELECTROCARDIOGRAM REPORT: CPT | Performed by: INTERNAL MEDICINE

## 2022-08-06 PROCEDURE — 93005 ELECTROCARDIOGRAM TRACING: CPT

## 2022-08-06 PROCEDURE — 83880 ASSAY OF NATRIURETIC PEPTIDE: CPT | Performed by: EMERGENCY MEDICINE

## 2022-08-06 PROCEDURE — 84484 ASSAY OF TROPONIN QUANT: CPT | Performed by: EMERGENCY MEDICINE

## 2022-08-06 PROCEDURE — 71275 CT ANGIOGRAPHY CHEST: CPT

## 2022-08-06 PROCEDURE — 99284 EMERGENCY DEPT VISIT MOD MDM: CPT

## 2022-08-06 PROCEDURE — 80053 COMPREHEN METABOLIC PANEL: CPT | Performed by: EMERGENCY MEDICINE

## 2022-08-06 PROCEDURE — 71045 X-RAY EXAM CHEST 1 VIEW: CPT

## 2022-08-06 PROCEDURE — 83735 ASSAY OF MAGNESIUM: CPT | Performed by: EMERGENCY MEDICINE

## 2022-08-06 RX ORDER — SODIUM CHLORIDE 0.9 % (FLUSH) 0.9 %
10 SYRINGE (ML) INJECTION AS NEEDED
Status: DISCONTINUED | OUTPATIENT
Start: 2022-08-06 | End: 2022-08-06 | Stop reason: HOSPADM

## 2022-08-06 RX ADMIN — METOPROLOL TARTRATE 5 MG: 1 INJECTION, SOLUTION INTRAVENOUS at 18:50

## 2022-08-06 RX ADMIN — IOPAMIDOL 95 ML: 755 INJECTION, SOLUTION INTRAVENOUS at 19:50

## 2022-08-06 NOTE — ED PROVIDER NOTES
" EMERGENCY DEPARTMENT ENCOUNTER    Room Number:  30/30  Date of encounter:  8/6/2022  PCP: Stephanie Waller MD  Historian: Patient      HPI:  Chief Complaint: Heart racing  A complete HPI/ROS/PMH/PSH/SH/FH are unobtainable due to: N/A    Context: Deja Juárez is a 77 y.o. female who presents to the ED c/o palpitations.  She had an episode few days ago that lasted approximately 10 minutes and then went away.  Today, her symptoms started about an hour to an hour and a half ago while she was at rest and have persisted.  She describes a \"fluttering\" sensation in her chest.  She does not feel out of breath.  She does not have any chest pain.  No syncopal episodes.  No nausea, vomiting or diarrhea.  She has been compliant with all of her medications.      Duration: An hour or so  Onset: Sudden  Timing: Constant  Location: Chest  Radiation: N/A  Quality: Fluttering  Intensity/Severity: Moderate  Progression: Continues  Associated Symptoms: None  Aggravating Factors: Exertion  Alleviating Factors: None  Previous Episodes: Yes-a few days ago  Treatment before arrival: None    Summary of prior records: Patient had a right total hip arthroplasty on 7/25/2022 and has been doing very well.  She has seen Dr. Garcia (cardiology) in the past and has a history of PACs, PACs and rare atrial tachycardia.  She is maintained on metoprolol which has helped with her palpitations.    The patient was placed in a mask in triage, hand hygiene was performed before and after my interaction with the patient.  I wore a mask, safety glasses and gloves during my entire interaction with the patient.    PAST MEDICAL HISTORY  Active Ambulatory Problems     Diagnosis Date Noted   • Hyperlipidemia    • Primary localized osteoarthritis of left knee 07/08/2015   • Preop cardiovascular exam 02/08/2017   • Abnormal ECG 02/08/2017   • Knee pain 07/08/2015   • GERD (gastroesophageal reflux disease)    • Primary localized osteoarthritis of right knee " 2017   • Palpitations 2020   • COVID-19 virus infection 2021   • Anxiety 2022     Resolved Ambulatory Problems     Diagnosis Date Noted   • Elevated blood pressure      Past Medical History:   Diagnosis Date   • Depression    • Hypertension    • Scoliosis          PAST SURGICAL HISTORY  Past Surgical History:   Procedure Laterality Date   • EYE SURGERY Left 2019q   • JOINT REPLACEMENT      bilateral knee replacement   • LIPOMA EXCISION      from shoulder   • TOTAL KNEE ARTHROPLASTY Left 2017         FAMILY HISTORY  Family History   Problem Relation Age of Onset   • Hypertension Mother    • Diabetes Father    • Hypertension Father          SOCIAL HISTORY  Social History     Socioeconomic History   • Marital status:    Tobacco Use   • Smoking status: Former Smoker     Packs/day: 1.00     Years: 5.00     Pack years: 5.00     Types: Cigarettes     Quit date: 1960     Years since quittin.6   • Smokeless tobacco: Never Used   • Tobacco comment: CAFFEINE USE   Substance and Sexual Activity   • Alcohol use: No   • Drug use: No         ALLERGIES  Patient has no known allergies.        REVIEW OF SYSTEMS  Review of Systems   Constitutional: Negative for chills and fever.   Respiratory: Negative for chest tightness and shortness of breath.    Cardiovascular: Positive for palpitations. Negative for chest pain.   Gastrointestinal: Negative for abdominal pain, diarrhea, nausea and vomiting.   Neurological: Negative for syncope.        All systems reviewed and negative except for those discussed in HPI.       PHYSICAL EXAM    I have reviewed the triage vital signs and nursing notes.    ED Triage Vitals [22 1819]   Temp Heart Rate Resp BP SpO2   98.2 °F (36.8 °C) (!) 170 20 121/73 98 %      Temp src Heart Rate Source Patient Position BP Location FiO2 (%)   -- -- -- -- --       Physical Exam   Constitutional: Pt. is oriented to person, place, and time and well-developed, well-nourished,  and in no distress.   HENT: Normocephalic and atraumatic.   Neck: Normal range of motion. Neck supple. No JVD present.   Cardiovascular: Normal rate, irregularly irregular.    Pulmonary/Chest: Effort normal and breath sounds normal. No stridor. No respiratory distress. No wheezes, no rales.   Abdominal: Soft. Bowel sounds are normal. No distension. There is no tenderness. There is no rebound and no guarding.   Musculoskeletal: Age-appropriate range of motion. No edema, tenderness or deformity.   Neurological: Pt. is alert and oriented to person, place, and time.  She has no focal neurologic deficits  Skin: Skin is warm and dry. No rash noted. Pt. is not diaphoretic. No erythema.   Psychiatric: Mood, affect and judgment normal.  She is pleasant and cooperative.  Nursing note and vitals reviewed.        LAB RESULTS  Recent Results (from the past 24 hour(s))   Green Top (Gel)    Collection Time: 08/06/22  6:37 PM   Result Value Ref Range    Extra Tube Hold for add-ons.    Lavender Top    Collection Time: 08/06/22  6:37 PM   Result Value Ref Range    Extra Tube hold for add-on    Gold Top - SST    Collection Time: 08/06/22  6:37 PM   Result Value Ref Range    Extra Tube Hold for add-ons.    Light Blue Top    Collection Time: 08/06/22  6:37 PM   Result Value Ref Range    Extra Tube Hold for add-ons.    Comprehensive Metabolic Panel    Collection Time: 08/06/22  6:37 PM    Specimen: Blood   Result Value Ref Range    Glucose 130 (H) 65 - 99 mg/dL    BUN 12 8 - 23 mg/dL    Creatinine 0.71 0.57 - 1.00 mg/dL    Sodium 139 136 - 145 mmol/L    Potassium 3.6 3.5 - 5.2 mmol/L    Chloride 98 98 - 107 mmol/L    CO2 26.1 22.0 - 29.0 mmol/L    Calcium 9.1 8.6 - 10.5 mg/dL    Total Protein 6.8 6.0 - 8.5 g/dL    Albumin 3.90 3.50 - 5.20 g/dL    ALT (SGPT) 15 1 - 33 U/L    AST (SGOT) 17 1 - 32 U/L    Alkaline Phosphatase 139 (H) 39 - 117 U/L    Total Bilirubin 0.3 0.0 - 1.2 mg/dL    Globulin 2.9 gm/dL    A/G Ratio 1.3 g/dL     BUN/Creatinine Ratio 16.9 7.0 - 25.0    Anion Gap 14.9 5.0 - 15.0 mmol/L    eGFR 88.2 >60.0 mL/min/1.73   BNP    Collection Time: 08/06/22  6:37 PM    Specimen: Blood   Result Value Ref Range    proBNP 1,032.0 0.0 - 1,800.0 pg/mL   Troponin    Collection Time: 08/06/22  6:37 PM    Specimen: Blood   Result Value Ref Range    Troponin T <0.010 0.000 - 0.030 ng/mL   Magnesium    Collection Time: 08/06/22  6:37 PM    Specimen: Blood   Result Value Ref Range    Magnesium 2.1 1.6 - 2.4 mg/dL   CBC Auto Differential    Collection Time: 08/06/22  6:37 PM    Specimen: Blood   Result Value Ref Range    WBC 12.93 (H) 3.40 - 10.80 10*3/mm3    RBC 3.14 (L) 3.77 - 5.28 10*6/mm3    Hemoglobin 9.0 (L) 12.0 - 15.9 g/dL    Hematocrit 27.2 (L) 34.0 - 46.6 %    MCV 86.6 79.0 - 97.0 fL    MCH 28.7 26.6 - 33.0 pg    MCHC 33.1 31.5 - 35.7 g/dL    RDW 12.5 12.3 - 15.4 %    RDW-SD 38.8 37.0 - 54.0 fl    MPV 9.0 6.0 - 12.0 fL    Platelets 532 (H) 140 - 450 10*3/mm3    Neutrophil % 71.1 42.7 - 76.0 %    Lymphocyte % 18.5 (L) 19.6 - 45.3 %    Monocyte % 6.6 5.0 - 12.0 %    Eosinophil % 1.5 0.3 - 6.2 %    Basophil % 0.4 0.0 - 1.5 %    Immature Grans % 1.9 (H) 0.0 - 0.5 %    Neutrophils, Absolute 9.21 (H) 1.70 - 7.00 10*3/mm3    Lymphocytes, Absolute 2.39 0.70 - 3.10 10*3/mm3    Monocytes, Absolute 0.85 0.10 - 0.90 10*3/mm3    Eosinophils, Absolute 0.19 0.00 - 0.40 10*3/mm3    Basophils, Absolute 0.05 0.00 - 0.20 10*3/mm3    Immature Grans, Absolute 0.24 (H) 0.00 - 0.05 10*3/mm3    nRBC 0.0 0.0 - 0.2 /100 WBC   ECG 12 Lead    Collection Time: 08/06/22  7:36 PM   Result Value Ref Range    QT Interval 373 ms       Ordered the above labs and independently reviewed the results.        RADIOLOGY  XR Chest 1 View    Result Date: 8/6/2022  PORTABLE CHEST X-RAY  HISTORY: Dyspnea.  Portable chest x-ray is provided. Correlation: Chest x-ray August 20, 2017.  FINDINGS: Prominent dextroscoliotic curvature of the thoracic spine. The cardiomediastinal  silhouette appears unchanged given differences in positioning. Vascular volume is normal and the lungs are clear. There is some calcified adenopathy at the left hilum, of no significance.      Prominent scoliotic curvature of the thoracic spine, unchanged. No acute abnormality identified.  This report was finalized on 8/6/2022 7:23 PM by Dr. Matti Hu M.D.      CT Angiogram Chest    Result Date: 8/6/2022  CT ANGIOGRAM CHEST  HISTORY: Heart palpitations and tachycardia. Recent hip arthroplasty. Evaluate for pulmonary embolism.  TECHNIQUE: CT angiogram of the chest with multiplanar and three-dimensional reformatted images produced at a separate workstation was performed using 95 mL of Isovue-370 IV contrast. No previous chest CT imaging for correlation.  Radiation dose reduction techniques were utilized, including automated exposure control and exposure modulation based on body size.  FINDINGS: The thoracic aorta is normal in caliber and enhances normally. The pulmonary arteries are normal in caliber and also enhances normally. There is no evidence of pulmonary embolism. Calcified lymph nodes are observed at the left hilum. There is no lymphadenopathy. No abnormality identified in the visualized upper abdomen.  The lungs are clear. Degenerative changes observed in the spine with dextroscoliotic curvature. No spinal compression deformity.      No evidence of pulmonary embolism or other acute abnormality. There is chronic scoliotic curvature and degenerative change in the thoracic spine.  This report was finalized on 8/6/2022 8:15 PM by Dr. Matti Hu M.D.        I ordered the above noted radiological studies. Reviewed by me and discussed with radiologist.  See dictation for official radiology interpretation.      PROCEDURES    Procedures      MEDICATIONS GIVEN IN ER    Medications   sodium chloride 0.9 % flush 10 mL (has no administration in time range)   metoprolol tartrate (LOPRESSOR) injection 5 mg (5 mg  Intravenous Given 8/6/22 1850)   iopamidol (ISOVUE-370) 76 % injection 100 mL (95 mL Intravenous Given by Other 8/6/22 1950)         PROGRESS, DATA ANALYSIS, CONSULTS, AND MEDICAL DECISION MAKING    Any/all labs have been independently reviewed by me.  Any/all radiology studies have been reviewed by me and discussed with radiologist dictating the report.   EKG's independently viewed and interpreted by me.  Discussion below represents my analysis of pertinent findings related to patient's condition, differential diagnosis, treatment plan and final disposition.    Number of Diagnoses or Management Options     Amount and/or Complexity of Data Reviewed  Clinical lab tests:  Yes  Tests in the radiology section of CPT®:  Yes  Tests in the medicine section of CPT®:  Yes  Review and summarize past medical records:  (Yes-see HPI)  Independent visualization of images, tracings, or specimens: (-See below)      ED Course as of 08/06/22 2037   Sat Aug 06, 2022   1834 EKG performed at 1823 and interpreted by me shows sinus tachycardia with a heart of 110.  NE intervals, QRS complexes and ST-T segments are unremarkable.  The last approximately 2 seconds of the EKG show atrial fibrillation. [WC]   1840 Cardiac monitor revealed atrial fibrillation at approximately 110 bpm as interpreted by me.  Cardiac monitoring was ordered secondary to the patient's history of palpitations and to monitor the patient for dysrhythmia. [WC]   1953 Repeat EKG performed at 1936 shows normal sinus rhythm with a rate of 81 bpm.  Parables, QS complexes and ST-T segments are unremarkable.  The rate is much improved and atrial fibrillation has resolved. [WC]   2023 Troponin T: <0.010 [WC]   2023 proBNP: 1,032.0 [WC]   2036 CBC shows mild anemia which is not unexpected after her recent hip surgery. [WC]   2036 CT angiogram was negative for PE. [WC]   2036 Sinus rhythm since receiving a dose of 5 mg IV metoprolol here.  She feels comfortable going home.  She  will follow-up with her cardiologist next week. [WC]      ED Course User Index  [WC] Teja Arora MD       AS OF 20:37 EDT VITALS:    BP - 101/58  HR - 79  TEMP - 98.2 °F (36.8 °C)  02 SATS - 95%        DIAGNOSIS  Final diagnoses:   Paroxysmal atrial fibrillation (HCC)         DISPOSITION  Discharge           Teja Arora MD  08/06/22 2037

## 2022-08-08 ENCOUNTER — TELEPHONE (OUTPATIENT)
Dept: CARDIOLOGY | Facility: CLINIC | Age: 77
End: 2022-08-08

## 2022-08-08 LAB — QT INTERVAL: 331 MS

## 2022-08-08 NOTE — TELEPHONE ENCOUNTER
Patient called and stated that she was in ER on Saturday.  She stated that her HR has been 103-160.  She wanted to know if she needed to come in and be seen?  Also wanted to know if she needed to have the CAT scan done before or after her follow up visit, it is scheduled for 8/18/22?  Please advise.    Juan M

## 2022-08-08 NOTE — TELEPHONE ENCOUNTER
Called and spoke with the patient and advised her of the above.  She verbalized understanding.    Juan M

## 2022-08-08 NOTE — TELEPHONE ENCOUNTER
Have her take 50 mg Toprol nightly. Schedule her to see me this week. Will leave CT calcium score on 8/18/2022 for now.    Thanks!  FRIDA Gill

## 2022-08-15 ENCOUNTER — OFFICE VISIT (OUTPATIENT)
Dept: CARDIOLOGY | Facility: CLINIC | Age: 77
End: 2022-08-15

## 2022-08-15 VITALS
OXYGEN SATURATION: 96 % | SYSTOLIC BLOOD PRESSURE: 120 MMHG | HEIGHT: 65 IN | BODY MASS INDEX: 27.49 KG/M2 | HEART RATE: 55 BPM | WEIGHT: 165 LBS | DIASTOLIC BLOOD PRESSURE: 68 MMHG

## 2022-08-15 DIAGNOSIS — I34.0 NONRHEUMATIC MITRAL VALVE REGURGITATION: ICD-10-CM

## 2022-08-15 DIAGNOSIS — R00.2 PALPITATIONS: Primary | ICD-10-CM

## 2022-08-15 DIAGNOSIS — F41.9 ANXIETY: ICD-10-CM

## 2022-08-15 DIAGNOSIS — I49.3 PVC (PREMATURE VENTRICULAR CONTRACTION): ICD-10-CM

## 2022-08-15 DIAGNOSIS — E78.5 HYPERLIPIDEMIA, UNSPECIFIED HYPERLIPIDEMIA TYPE: Chronic | ICD-10-CM

## 2022-08-15 DIAGNOSIS — M15.9 PRIMARY OSTEOARTHRITIS INVOLVING MULTIPLE JOINTS: ICD-10-CM

## 2022-08-15 PROCEDURE — 99214 OFFICE O/P EST MOD 30 MIN: CPT | Performed by: NURSE PRACTITIONER

## 2022-08-15 PROCEDURE — 93000 ELECTROCARDIOGRAM COMPLETE: CPT | Performed by: NURSE PRACTITIONER

## 2022-08-15 RX ORDER — METOPROLOL SUCCINATE 50 MG/1
50 TABLET, EXTENDED RELEASE ORAL DAILY
Qty: 90 TABLET | Refills: 3 | Status: SHIPPED | OUTPATIENT
Start: 2022-08-15 | End: 2022-09-21

## 2022-08-15 NOTE — PROGRESS NOTES
[facility] CARDIOLOGY  [address]  [facility] [address]  [facility] [address]  Phone: [phone]      Patient Name: Deja Juárez  :1945  Age: 77 y.o.  Primary Cardiologist: Lucy Garcia MD  Encounter Provider:  FRIDA Fontenot      Chief Complaint     Chief Complaint: hld and Follow-up      SUBJECTIVE     History of Present Illness:  Deja Juárez is a 77 y.o.  female whose medical history includes anxiety, depression, hyperlipidemia, scoliosis, and osteoarthritis. She is followed in our office by Dr. Garcia for frequent PVC's.     08/15/22 Follow-up:  She is here for ER follow up of tachycardia and I am seeing her for the first time today. She had right hip replacement about 3 weeks ago and has been doing physical therapy. On two occasions following PT, her heart has been fast and she has been short of breath with this; no chest pain or lightheadedness. On one episode, she went to ER and was noted to have HR up to 160s; EKG showed sinus tachycardia. ER notes indicate that Afib was noted on the monitor, but there were no strips saved. She notified our office on the second episode and I increased her Toprol to 50 mg nightly. Since that time, she has no further episodes of her heart racing. She denies chest pain, lightheadedness, or leg swelling. She has no symptoms when doing normal activities around the house.     Below is a summary of pertinent cardiology findings:  • Former smoker; quit . Denies drugs or alcohol. Drinks one cup of coffee daily. She is  and has children; has 2 grandchildren. She is retired. Family history includes both parents with hypertension; diabetes in her father.  • 2/10/2017 Echocardiogram showed EF 69%, grade 2 diastolic dysfunction, and mild mitral insufficiency. Myocardial perfusion stress test was non-ischemic.   • 2017 Holter monitor showed PVCs, PACs, and one run of PSVT. She was started on metoprolol.    • 3/4/2019 Echocardiogram showed EF 57%, grade 1 diastolic dysfunction, mild mitral insufficiency, and thickened aortic valve. Holter monitor showed frequent PVCs and occasional PACs.   • 2022 CTA chest no evidence of PE, lungs are clear.    Past Medical History:   Diagnosis Date   • Anxiety    • Depression    • GERD (gastroesophageal reflux disease)    • Hyperlipidemia    • Hypertension    • Scoliosis        Past Surgical History:  · Left eye surgery 2019  · Bilateral knee replacements;  and     Social History     Socioeconomic History   • Marital status:    Tobacco Use   • Smoking status: Former Smoker     Packs/day: 1.00     Years: 5.00     Pack years: 5.00     Types: Cigarettes     Quit date: 1960     Years since quittin.6   • Smokeless tobacco: Never Used   • Tobacco comment: CAFFEINE USE   Substance and Sexual Activity   • Alcohol use: No   • Drug use: No         Review of Systems     Review of Systems   Cardiovascular: Positive for dyspnea on exertion and palpitations. Negative for chest pain, irregular heartbeat, leg swelling, near-syncope, orthopnea and syncope.   Musculoskeletal: Positive for joint pain.       Medications     Allergies as of 08/15/2022   • (No Known Allergies)       Current Outpatient Medications on File Prior to Visit   Medication Sig   • calcium citrate (CALCITRATE) 950 (200 Ca) MG tablet Take 1 tablet by mouth Daily.   • Cholecalciferol (VITAMIN D) 1000 UNITS tablet Take 1,000 Units by mouth.   • famotidine (PEPCID) 20 MG tablet Take 20 mg by mouth.   • Multiple Vitamin (MULTIVITAMIN) tablet Take 1 tablet by mouth Daily.   • rosuvastatin (Crestor) 5 MG tablet Take 1 tablet by mouth Daily.   • sertraline (Zoloft) 25 MG tablet Take 1 tablet by mouth Daily.   • [DISCONTINUED] metoprolol succinate XL (TOPROL-XL) 25 MG 24 hr tablet Take 1 tablet by mouth Daily.   • [DISCONTINUED] fluticasone (Flonase) 50 MCG/ACT nasal spray 2 sprays into the nostril(s) as directed  "by provider Daily.     No current facility-administered medications on file prior to visit.          OBJECTIVE     Vital Signs:   /68   Pulse 55   Ht 165.1 cm (65\")   Wt 74.8 kg (165 lb)   SpO2 96%   BMI 27.46 kg/m²       Weight:  Wt Readings from Last 3 Encounters:   08/15/22 74.8 kg (165 lb)   08/06/22 74.8 kg (165 lb)   06/29/22 75.8 kg (167 lb)     Body mass index is 27.46 kg/m².      Physical Exam     Physical Exam  Constitutional:       General: She is not in acute distress.  HENT:      Head: Normocephalic and atraumatic.      Mouth/Throat:      Mouth: Mucous membranes are moist.   Eyes:      General: No scleral icterus.     Extraocular Movements: Extraocular movements intact.      Conjunctiva/sclera: Conjunctivae normal.      Pupils: Pupils are equal, round, and reactive to light.   Cardiovascular:      Rate and Rhythm: Regular rhythm. Bradycardia present.      Pulses: Normal pulses.      Heart sounds: S1 normal and S2 normal.   Pulmonary:      Effort: No respiratory distress.      Breath sounds: Normal breath sounds. No wheezing, rhonchi or rales.   Abdominal:      General: Bowel sounds are normal. There is no distension.      Palpations: Abdomen is soft.      Tenderness: There is no abdominal tenderness.   Musculoskeletal:         General: Normal range of motion.      Cervical back: Normal range of motion and neck supple.      Right lower leg: No edema.      Left lower leg: No edema.   Skin:     General: Skin is warm and dry.      Coloration: Skin is not jaundiced.   Neurological:      Mental Status: She is alert and oriented to person, place, and time.   Psychiatric:         Mood and Affect: Mood normal.         Reviewed Data     Result Review :  The following data was reviewed by FRIDA Fontenot on 08/15/22:  • Labs 08/06/2022:  cr 0.7, K 3.6, otherwise unremarkable CMP, Mg 2.1, Hgb 9.0, Plt 532, troponin <0.010, proBNP 1032   • Labs 01/21/2022:  Chol 255, HDL 59, , Trig " 172       ECG 12 Lead    Date/Time: 8/15/2022 1:35 PM  Performed by: Coni Jones APRN  Authorized by: Coni Jones APRN   Comparison: compared with previous ECG from 8/6/2022  Rhythm: sinus bradycardia  Rate: bradycardic  BPM: 56  Other findings: T wave abnormality    Clinical impression: non-specific ECG            Assessment and Plan        Assessment and Plan     Assessment:  1. Palpitations    2. PVC (premature ventricular contraction)    3. Nonrheumatic mitral valve regurgitation    4. Hyperlipidemia, unspecified hyperlipidemia type    5. Anxiety    6. Primary osteoarthritis involving multiple joints         1. Palpitations: Has had 2 episodes of her heart racing and this has been associated with dyspnea following right hip replacement.  With both episodes these occurred after she got home from physical therapy and was resting.  She has had no further episodes since increasing her metoprolol succinate to 50 mg daily.  She has history of frequent PVCs.  2. PVCs: Holter monitor from March 2019 showed frequent PVCs.  ECG today shows normal sinus with a bradycardic rate.  3. Mitral valve insufficiency: This was graded as mild on echocardiogram from March 2019.  4. Hyperlipidemia: Lipids not at goal when checked in January 2022.  She has been started on 5 mg Crestor daily.  5. Anxiety: She had not been taking her anxiety medications postoperatively even wonder if this contributes to her episodes of her heart racing.  She denies any increased anxiety.  6. Osteoarthritis: She has had bilateral knee replacements and recently had right hip replacement.  She is undergoing physical therapy.      Plan:  1. I will check a 14-day Zio patch monitor.  2. I will check an echocardiogram to rule out structural or valvular issue.  She is already scheduled for a CT calcium score later this week.  3. Barring any abnormalities on testing, she will see Dr. Garcia in 6 months.      Follow Up:  No  follow-ups on file.  Orders Placed This Encounter   Procedures   • ECG 12 Lead   • Adult Transthoracic Echo Complete W/ Cont if Necessary Per Protocol      New Medications Ordered This Visit   Medications   • metoprolol succinate XL (TOPROL-XL) 50 MG 24 hr tablet     Sig: Take 1 tablet by mouth Daily.     Dispense:  90 tablet     Refill:  3         Thank you the opportunity to participate in this patient's care.    FRIDA Bryant    This office note has been dictated.

## 2022-08-18 ENCOUNTER — HOSPITAL ENCOUNTER (OUTPATIENT)
Dept: CT IMAGING | Facility: HOSPITAL | Age: 77
Discharge: HOME | End: 2022-08-18
Admitting: INTERNAL MEDICINE

## 2022-08-18 DIAGNOSIS — E78.5 HYPERLIPIDEMIA, UNSPECIFIED HYPERLIPIDEMIA TYPE: Chronic | ICD-10-CM

## 2022-08-18 DIAGNOSIS — R94.31 ABNORMAL ECG: ICD-10-CM

## 2022-08-18 PROCEDURE — 75571 CT HRT W/O DYE W/CA TEST: CPT

## 2022-08-22 RX ORDER — ASPIRIN 81 MG/1
81 TABLET ORAL DAILY
Qty: 30 TABLET | Refills: 11
Start: 2022-08-22

## 2022-08-22 RX ORDER — ROSUVASTATIN CALCIUM 10 MG/1
10 TABLET, COATED ORAL NIGHTLY
Qty: 90 TABLET | Refills: 3 | Status: SHIPPED | OUTPATIENT
Start: 2022-08-22

## 2022-08-23 ENCOUNTER — OFFICE VISIT (OUTPATIENT)
Dept: INTERNAL MEDICINE | Facility: CLINIC | Age: 77
End: 2022-08-23

## 2022-08-23 VITALS
WEIGHT: 164 LBS | HEART RATE: 65 BPM | SYSTOLIC BLOOD PRESSURE: 136 MMHG | OXYGEN SATURATION: 98 % | HEIGHT: 65 IN | TEMPERATURE: 98 F | BODY MASS INDEX: 27.32 KG/M2 | DIASTOLIC BLOOD PRESSURE: 66 MMHG

## 2022-08-23 DIAGNOSIS — Z00.00 MEDICARE ANNUAL WELLNESS VISIT, SUBSEQUENT: Primary | ICD-10-CM

## 2022-08-23 DIAGNOSIS — E78.5 HYPERLIPIDEMIA, UNSPECIFIED HYPERLIPIDEMIA TYPE: ICD-10-CM

## 2022-08-23 DIAGNOSIS — Z12.31 ENCOUNTER FOR SCREENING MAMMOGRAM FOR BREAST CANCER: ICD-10-CM

## 2022-08-23 DIAGNOSIS — D64.9 ANEMIA, UNSPECIFIED TYPE: ICD-10-CM

## 2022-08-23 PROCEDURE — 1170F FXNL STATUS ASSESSED: CPT | Performed by: FAMILY MEDICINE

## 2022-08-23 PROCEDURE — G0439 PPPS, SUBSEQ VISIT: HCPCS | Performed by: FAMILY MEDICINE

## 2022-08-23 PROCEDURE — 1159F MED LIST DOCD IN RCRD: CPT | Performed by: FAMILY MEDICINE

## 2022-08-23 NOTE — PROGRESS NOTES
The ABCs of the Annual Wellness Visit  Subsequent Medicare Wellness Visit    Chief Complaint   Patient presents with   • Medicare Wellness-subsequent      Subjective    History of Present Illness:  Deja Juárez is a 77 y.o. female who presents for a Subsequent Medicare Wellness Visit.    The following portions of the patient's history were reviewed and   updated as appropriate: allergies, current medications, past family history, past medical history, past social history, past surgical history and problem list.    Compared to one year ago, the patient feels her physical   health is the same.    Compared to one year ago, the patient feels her mental   health is better. Taking medication daily     Recent Hospitalizations:  She was admitted within the past 365 days at [facility].       Current Medical Providers:  Patient Care Team:  Stephanie Waller MD as PCP - General (Family Medicine)  Asmita Fox APRN as Nurse Practitioner (Internal Medicine)    Outpatient Medications Prior to Visit   Medication Sig Dispense Refill   • aspirin (aspirin) 81 MG EC tablet Take 1 tablet by mouth Daily. 30 tablet 11   • calcium citrate (CALCITRATE) 950 (200 Ca) MG tablet Take 1 tablet by mouth Daily.     • Cholecalciferol (VITAMIN D) 1000 UNITS tablet Take 1,000 Units by mouth.     • famotidine (PEPCID) 20 MG tablet Take 20 mg by mouth.     • metoprolol succinate XL (TOPROL-XL) 50 MG 24 hr tablet Take 1 tablet by mouth Daily. 90 tablet 3   • Multiple Vitamin (MULTIVITAMIN) tablet Take 1 tablet by mouth Daily.     • rosuvastatin (Crestor) 10 MG tablet Take 1 tablet by mouth Every Night. 90 tablet 3   • sertraline (Zoloft) 25 MG tablet Take 1 tablet by mouth Daily. 30 tablet 3     No facility-administered medications prior to visit.       No opioid medication identified on active medication list. I have reviewed chart for other potential  high risk medication/s and harmful drug interactions in the elderly.          Aspirin  "is on active medication list. Aspirin use is indicated based on review of current medical condition/s. Pros and cons of this therapy have been discussed today. Benefits of this medication outweigh potential harm.  Patient has been encouraged to continue taking this medication.  .      Patient Active Problem List   Diagnosis   • Hyperlipidemia   • Primary localized osteoarthritis of left knee   • Preop cardiovascular exam   • Abnormal ECG   • Knee pain   • GERD (gastroesophageal reflux disease)   • Primary localized osteoarthritis of right knee   • Palpitations   • COVID-19 virus infection   • Anxiety     Advance Care Planning  Advance Directive is not on file.  ACP discussion was held with the patient during this visit. Patient has an advance directive (not in EMR), copy requested.    Review of Systems   Constitutional: Negative for diaphoresis and fever.   HENT: Negative for congestion, rhinorrhea and sneezing.    Eyes: Negative for visual disturbance.   Respiratory: Negative for cough and shortness of breath.    Cardiovascular: Negative for chest pain.   Gastrointestinal: Negative for abdominal pain, constipation, diarrhea, nausea and vomiting.   Genitourinary: Negative for difficulty urinating.   Musculoskeletal: Negative for arthralgias and back pain.   Skin: Negative for wound.   Neurological: Negative for dizziness.   Hematological: Does not bruise/bleed easily.   Psychiatric/Behavioral: Negative for dysphoric mood and sleep disturbance.        Objective    Vitals:    08/23/22 1056   BP: 136/66   Pulse: 65   Temp: 98 °F (36.7 °C)   SpO2: 98%   Weight: 74.4 kg (164 lb)   Height: 165.1 cm (65\")     Estimated body mass index is 27.29 kg/m² as calculated from the following:    Height as of this encounter: 165.1 cm (65\").    Weight as of this encounter: 74.4 kg (164 lb).    BMI is >= 25 and <30. (Overweight) The following options were offered after discussion;: 30 min exercise daily, high fiber low fat diet " recommended.      Does the patient have evidence of cognitive impairment? No    Physical Exam  Constitutional:       General: She is not in acute distress.     Appearance: She is not ill-appearing.   HENT:      Head: Normocephalic.      Right Ear: Tympanic membrane normal.      Left Ear: Tympanic membrane normal.      Mouth/Throat:      Mouth: Mucous membranes are moist.      Pharynx: No oropharyngeal exudate.   Eyes:      Conjunctiva/sclera: Conjunctivae normal.   Cardiovascular:      Rate and Rhythm: Regular rhythm.      Heart sounds: Normal heart sounds.   Pulmonary:      Effort: No respiratory distress.      Breath sounds: Normal breath sounds. No wheezing.   Abdominal:      General: There is no distension.      Palpations: Abdomen is soft.      Tenderness: There is no abdominal tenderness.   Musculoskeletal:      Right lower leg: No edema.      Left lower leg: No edema.   Neurological:      Mental Status: She is alert and oriented to person, place, and time.   Psychiatric:         Mood and Affect: Mood normal.                HEALTH RISK ASSESSMENT    Smoking Status:  Social History     Tobacco Use   Smoking Status Former Smoker   • Packs/day: 1.00   • Years: 5.00   • Pack years: 5.00   • Types: Cigarettes   • Quit date:    • Years since quittin.7   Smokeless Tobacco Never Used   Tobacco Comment    CAFFEINE USE     Alcohol Consumption:  Social History     Substance and Sexual Activity   Alcohol Use No     Fall Risk Screen:    STEADI Fall Risk Assessment was completed, and patient is at LOW risk for falls.Assessment completed on:2022    Depression Screening:  PHQ-2/PHQ-9 Depression Screening 2022   Retired PHQ-9 Total Score -   Retired Total Score -   Little Interest or Pleasure in Doing Things 0-->not at all   Feeling Down, Depressed or Hopeless 0-->not at all   PHQ-9: Brief Depression Severity Measure Score 0       Health Habits and Functional and Cognitive Screening:  Functional & Cognitive  Status 8/23/2022   Do you have difficulty preparing food and eating? No   Do you have difficulty bathing yourself, getting dressed or grooming yourself? No   Do you have difficulty using the toilet? No   Do you have difficulty moving around from place to place? No   Do you have trouble with steps or getting out of a bed or a chair? No   Current Diet Well Balanced Diet   Dental Exam Up to date   Eye Exam Up to date   Exercise (times per week) 0 times per week        Exercise Frequency Comment active   Current Exercises Include Walking        Exercise Comment -   Current Exercise Activities Include -   Do you need help using the phone?  No   Are you deaf or do you have serious difficulty hearing?  No   Do you need help with transportation? No   Do you need help shopping? No   Do you need help preparing meals?  No   Do you need help with housework?  No   Do you need help with laundry? No   Do you need help taking your medications? No   Do you need help managing money? No   Do you ever drive or ride in a car without wearing a seat belt? No   Have you felt unusual stress, anger or loneliness in the last month? No   Who do you live with? Spouse   If you need help, do you have trouble finding someone available to you? No   Have you been bothered in the last four weeks by sexual problems? No   Do you have difficulty concentrating, remembering or making decisions? No       Age-appropriate Screening Schedule:  Refer to the list below for future screening recommendations based on patient's age, sex and/or medical conditions. Orders for these recommended tests are listed in the plan section. The patient has been provided with a written plan.    Health Maintenance   Topic Date Due   • ZOSTER VACCINE (1 of 2) Never done   • MAMMOGRAM  06/29/2022   • INFLUENZA VACCINE  10/01/2022   • DXA SCAN  12/01/2022   • LIPID PANEL  08/23/2023   • TDAP/TD VACCINES (3 - Td or Tdap) 04/22/2030              Assessment & Plan   CMS Preventative  Services Quick Reference  Risk Factors Identified During Encounter  Immunizations Discussed/Encouraged (specific Immunizations; Shingrix and COVID19  Pt refuses colonoscopy and immunizations.    The above risks/problems have been discussed with the patient.  Follow up actions/plans if indicated are seen below in the Assessment/Plan Section.  Pertinent information has been shared with the patient in the After Visit Summary.    Diagnoses and all orders for this visit:    1. Medicare annual wellness visit, subsequent (Primary)    2. Encounter for screening mammogram for breast cancer  -     Cancel: Mammo Screening, Bilateral with CAD (Annually); Future    3. Hyperlipidemia, unspecified hyperlipidemia type  -     Lipid panel    4. Anemia, unspecified type  -     CBC w AUTO Differential    Other orders  -     CBC & Differential        Follow Up:   Return in about 6 months (around 2/23/2023).       She refuses immunizations and colonoscopy.  Does not want a cologuard either.      An After Visit Summary and PPPS were made available to the patient.

## 2022-08-23 NOTE — PATIENT INSTRUCTIONS
Advance Care Planning and Advance Directives     You make decisions on a daily basis - decisions about where you want to live, your career, your home, your life. Perhaps one of the most important decisions you face is your choice for future medical care. Take time to talk with your family and your healthcare team and start planning today.  Advance Care Planning is a process that can help you:  · Understand possible future healthcare decisions in light of your own experiences  · Reflect on those decision in light of your goals and values  · Discuss your decisions with those closest to you and the healthcare professionals that care for you  · Make a plan by creating a document that reflects your wishes    Surrogate Decision Maker  In the event of a medical emergency, which has left you unable to communicate or to make your own decisions, you would need someone to make decisions for you.  It is important to discuss your preferences for medical treatment with this person while you are in good health.     Qualities of a surrogate decision maker:  • Willing to take on this role and responsibility  • Knows what you want for future medical care  • Willing to follow your wishes even if they don't agree with them  • Able to make difficult medical decisions under stressful circumstances    Advance Directives  These are legal documents you can create that will guide your healthcare team and decision maker(s) when needed. These documents can be stored in the electronic medical record.    · Living Will - a legal document to guide your care if you have a terminal condition or a serious illness and are unable to communicate. States vary by statute in document names/types, but most forms may include one or more of the following:        -  Directions regarding life-prolonging treatments        -  Directions regarding artificially provided nutrition/hydration        -  Choosing a healthcare decision maker        -  Direction  "regarding organ/tissue donation    · Durable Power of  for Healthcare - this document names an -in-fact to make medical decisions for you, but it may also allow this person to make personal and financial decisions for you. Please seek the advice of an  if you need this type of document.    **Advance Directives are not required and no one may discriminate against you if you do not sign one.    Medical Orders  Many states allow specific forms/orders signed by your physician to record your wishes for medical treatment in your current state of health. This form, signed in personal communication with your physician, addresses resuscitation and other medical interventions that you may or may not want.      For more information or to schedule a time with a [facility] Advance Care Planning Facilitator contact: [facility] or call [phone] and someone will contact you directly.    [email or web address]/types/[email or web address]/patient/[email or web address]-[email or web address]   Preventing Colorectal Cancer  Colorectal cancer is a cancerous (malignant) tumor in the colon or rectum, which are parts of the large intestine. A tumor is a mass of cells or tissue. If this cancer is not found early, it can spread to other parts of the body and can be life-threatening. It is one of the most commonly diagnosed types of cancer and a leading cause of death related to cancer.  Colorectal cancer cannot always be prevented, but you can take steps to lower your risk of developing it.  How can this condition affect me?  Most often, colorectal cancer starts as abnormal growths called polyps on the inner wall of the colon or rectum. Left untreated, these polyps can develop into cancer. You can help prevent colorectal cancer by having screening tests to check for these early signs of cancer. During screening, polyps may be removed to be checked for cancer cells and possibly to prevent them from becoming " cancerous. If you delay recommended screenings, it is possible that any existing cancer will grow and spread before it is found.  Along with regular screenings, making changes to your diet and lifestyle can help prevent colorectal cancer.  What can increase my risk?  Certain factors may make you more likely to develop this condition.  Risk factors that can be changed  · Being inactive (sedentary).  · Being overweight or obese.  · Having a diet that:  ? Is high in red meat, precooked or cured meat, or other processed meat, and high in fat (especially animal fat).  ? Is low in sources of fiber, such as fruits, vegetables, and whole grains.  ? Includes large amounts of sugar-sweetened beverages.  · Drinking too much alcohol.  · Smoking.  Risk factors that cannot be changed  · Being older than age 50.  · Having a personal or family history of colorectal cancer or polyps in your colon.  · Having a type of genetic syndrome that is passed from parent to child (hereditary), such as:  ? Freeman syndrome.  ? Familial adenomatous polyposis.  ? Turcot syndrome.  ? Peutz-Jeghers syndrome.  ? MUTYH-associated polyposis (MAP).  · Having certain other conditions, such as:  ? Inflammatory bowel disease. This includes ulcerative colitis or Crohn's disease.  ? Diabetes.  ? A history of cancer that was treated with radiation to the abdomen or the area between the hip bones (pelvic area).  What actions can I take to prevent this?  Nutrition    · Eat plenty of fruits and vegetables. Try to eat at least:  ? 1½-2 cups of fruit each day.  ? 2½-3 cups of vegetables each day.  · Eat whole grains, which are grains that have not been processed. They include oats, whole wheat, bulgur, brown rice, quinoa, and millet. You should eat 6-8 oz (171-227 g) of grains each day. Use a kitchen scale to measure these amounts.  · Eat less red meat. Instead, choose lean sources of protein, such as beans, tofu, fish, and chicken.  · Avoid precooked or cured  meat, such as sausages or meat loaves. Avoid frying and cooking meat at high heat. Use other methods of cooking, such as steaming or sautéing.    Lifestyle  · Do not use any products that contain nicotine or tobacco, such as cigarettes, e-cigarettes, and chewing tobacco. If you need help quitting, ask your health care provider.  · If you drink alcohol:  ? Limit how much you use to:  § 0-1 drink a day for women who are not pregnant.  § 0-2 drinks a day for men.  ? Be aware of how much alcohol is in your drink. In the [place] one drink equals one 12 oz bottle of beer (355 mL), one 5 oz glass of wine (148 mL), or one 1½ oz glass of hard liquor (44 mL).  · If you are overweight or obese, work with your health care provider to lose weight.  · Exercise. Each week, aim for at least 150 minutes of moderate exercise (such as walking, biking, or yoga) or 75 minutes of vigorous exercise (such as running or swimming). Ask your health care provider how much physical activity is best for you.  Getting screened  Have regular screenings as often as recommended by your health care provider.  · All adults should have screenings starting at age 45 and continuing until age 75. Your health care provider may recommend screening before age 45. You will have tests every 1-10 years, depending on your results and the type of screening test. People at increased risk should start screening at an earlier age.  · There are several types of screening tests, such as:  ? Tests to check a stool (feces) sample for blood or for DNA changes that could lead to cancer.  ? Sigmoidoscopy. During this test, a flexible tube with a tiny camera (sigmoidoscope) is inserted through the opening between the buttocks (anus) and is used to examine the rectum and lower colon.  ? Colonoscopy. During this test, a long, thin, flexible tube with a tiny camera (colonoscope) is used to examine the entire colon and rectum.  ? Virtual colonoscopy. Instead of a colonoscope,  this type of colonoscopy uses a CT scan and computers to take pictures of the colon and rectum.  General information  · Ask your health care provider if you should take low-dose aspirin to help prevent polyps.  · Find out about your family's medical history. It is important to know whether colorectal cancer is in your family. If you have a family history of colorectal cancer, ask to meet with a genetic counselor.  Where to find support  · Talk with your health care provider about screenings, a healthy diet, and proper exercise.  · If you need help to quit smoking or using tobacco, talk to your health care provider or visit these websites:  ? SmokeFree.gov: smokefree.gov  ? [place] Department of Health and Human Services: betobaccofree.55tuan.com.gov  Where to find more information  · American Cancer Society: cancer.org  · National Cancer [place]: cancer.gov  Contact a health care provider if you have:  · Blood in your stool or in the toilet after a bowel movement.  · Discomfort, pain, bloating, fullness, or cramps in your abdomen.  · A change in your bowel habits.  · Unexplained weight loss.  · Nausea and vomiting.  · Constant tiredness (fatigue).  Summary  · You can reduce your chances of getting colorectal cancer by getting recommended screenings and making certain diet and lifestyle changes.  · Eat plenty of fruits, vegetables, and whole grains. Avoid red meats and processed meats.  · Do not use any products that contain nicotine or tobacco, such as cigarettes, e-cigarettes, and chewing tobacco.  · Aim for at least 150 minutes of moderate exercise or 75 minutes of vigorous exercise each week. Ask your health care provider how much activity is best for you.  · Get regular screenings as often as recommended by your health care provider. Most people should start having colonoscopies at age 45.  This information is not intended to replace advice given to you by your health care provider. Make sure you discuss any questions  you have with your health care provider.  Document Revised: 04/07/2021 Document Reviewed: 04/07/2021  Elsevier Patient Education © 2021 [employer]      Colonoscopy, Adult  A colonoscopy is a procedure to look at the entire large intestine. This procedure is done using a long, thin, flexible tube that has a camera on the end.  You may have a colonoscopy:  · As a part of normal colorectal screening.  · If you have certain symptoms, such as:  ? A low number of red blood cells in your blood (anemia).  ? Diarrhea that does not go away.  ? Pain in your abdomen.  ? Blood in your stool.  A colonoscopy can help screen for and diagnose medical problems, including:  · Tumors.  · Extra tissue that grows where mucus forms (polyps).  · Inflammation.  · Areas of bleeding.  Tell your health care provider about:  · Any allergies you have.  · All medicines you are taking, including vitamins, herbs, eye drops, creams, and over-the-counter medicines.  · Any problems you or family members have had with anesthetic medicines.  · Any blood disorders you have.  · Any surgeries you have had.  · Any medical conditions you have.  · Any problems you have had with having bowel movements.  · Whether you are pregnant or may be pregnant.  What are the risks?  Generally, this is a safe procedure. However, problems may occur, including:  · Bleeding.  · Damage to your intestine.  · Allergic reactions to medicines given during the procedure.  · Infection. This is rare.  What happens before the procedure?  Eating and drinking restrictions  Follow instructions from your health care provider about eating or drinking restrictions, which may include:  · A few days before the procedure:  ? Follow a low-fiber diet.  ? Avoid nuts, seeds, dried fruit, raw fruits, and vegetables.  · 1-3 days before the procedure:  ? Eat only gelatin dessert or ice pops.  ? Drink only clear liquids, such as water, clear juice, clear broth or bouillon, black coffee or tea, or  clear soft drinks or sports drinks.  ? Avoid liquids that contain red or purple dye.  · The day of the procedure:  ? Do not eat solid foods. You may continue to drink clear liquids until up to 2 hours before the procedure.  ? Do not eat or drink anything starting 2 hours before the procedure, or within the time period that your health care provider recommends.  Bowel prep  If you were prescribed a bowel prep to take by mouth (orally) to clean out your colon:  · Take it as told by your health care provider. Starting the day before your procedure, you will need to drink a large amount of liquid medicine. The liquid will cause you to have many bowel movements of loose stool until your stool becomes almost clear or light green.  · If your skin or the opening between the buttocks (anus) gets irritated from diarrhea, you may relieve the irritation using:  ? Wipes with medicine in them, such as adult wet wipes with aloe and vitamin E.  ? A product to soothe skin, such as petroleum jelly.  · If you vomit while drinking the bowel prep:  ? Take a break for up to 60 minutes.  ? Begin the bowel prep again.  ? Call your health care provider if you keep vomiting or you cannot take the bowel prep without vomiting.  · To clean out your colon, you may also be given:  ? Laxative medicines. These help you have a bowel movement.  ? Instructions for enema use. An enema is liquid medicine injected into your rectum.  Medicines  Ask your health care provider about:  · Changing or stopping your regular medicines or supplements. This is especially important if you are taking iron supplements, diabetes medicines, or blood thinners.  · Taking medicines such as aspirin and ibuprofen. These medicines can thin your blood. Do not take these medicines unless your health care provider tells you to take them.  · Taking over-the-counter medicines, vitamins, herbs, and supplements.  General instructions  · Ask your health care provider what steps will  be taken to help prevent infection. These may include washing skin with a germ-killing soap.  · Plan to have someone take you home from the hospital or clinic.  What happens during the procedure?    · An IV will be inserted into one of your veins.  · You may be given one or more of the following:  ? A medicine to help you relax (sedative).  ? A medicine to numb the area (local anesthetic).  ? A medicine to make you fall asleep (general anesthetic). This is rarely needed.  · You will lie on your side with your knees bent.  · The tube will:  ? Have oil or gel put on it (be lubricated).  ? Be inserted into your anus.  ? Be gently eased through all parts of your large intestine.  · Air will be sent into your colon to keep it open. This may cause some pressure or cramping.  · Images will be taken with the camera and will appear on a screen.  · A small tissue sample may be removed to be looked at under a microscope (biopsy). The tissue may be sent to a lab for testing if any signs of problems are found.  · If small polyps are found, they may be removed and checked for cancer cells.  · When the procedure is finished, the tube will be removed.  The procedure may vary among health care providers and hospitals.  What happens after the procedure?  · Your blood pressure, heart rate, breathing rate, and blood oxygen level will be monitored until you leave the hospital or clinic.  · You may have a small amount of blood in your stool.  · You may pass gas and have mild cramping or bloating in your abdomen. This is caused by the air that was used to open your colon during the exam.  · Do not drive for 24 hours after the procedure.  · It is up to you to get the results of your procedure. Ask your health care provider, or the department that is doing the procedure, when your results will be ready.  Summary  · A colonoscopy is a procedure to look at the entire large intestine.  · Follow instructions from your health care provider about  eating and drinking before the procedure.  · If you were prescribed an oral bowel prep to clean out your colon, take it as told by your health care provider.  · During the colonoscopy, a flexible tube with a camera on its end is inserted into the anus and then passed into the other parts of the large intestine.  This information is not intended to replace advice given to you by your health care provider. Make sure you discuss any questions you have with your health care provider.  Document Revised: 07/10/2020 Document Reviewed: 07/10/2020  [employer] Patient Education ©  [employer]    You are due for Shingrix vaccination series ( the newest shingles vaccine).  It is a two shot series spaced 2-6 months apart. Please get this vaccine series started at your earliest convenience at your local pharmacy to help avoid shingles outbreak. It is more effective than the old Zostavax vaccine and is recommended even if you have had the Zostavax vaccine in the past.  Once the Shingrix series is completed, it does not need to be repeated.   For more information, please look at the website below:  [facility] Shingrix Vaccine Information      Medicare Wellness  Personal Prevention Plan of Service     Date of Office Visit:    Encounter Provider:  Stephanie Waller MD  Place of Service:  [facility]  Patient Name: Deja Juárez  :  1945    As part of the Medicare Wellness portion of your visit today, we are providing you with this personalized preventive plan of services (PPPS). This plan is based upon recommendations of the [place] Preventive Services Task Force (USPSTF) and the Advisory Committee on Immunization Practices (ACIP).    This lists the preventive care services that should be considered, and provides dates of when you are due. Items listed as completed are up-to-date and do not require any further intervention.    Health Maintenance   Topic Date Due   • COVID-19 Vaccine (1) Never  done   • ZOSTER VACCINE (1 of 2) Never done   • COLORECTAL CANCER SCREENING  06/06/2021   • MAMMOGRAM  06/29/2022   • INFLUENZA VACCINE  10/01/2022   • DXA SCAN  12/01/2022   • LIPID PANEL  01/21/2023   • ANNUAL WELLNESS VISIT  08/23/2023   • TDAP/TD VACCINES (3 - Td or Tdap) 04/22/2030   • HEPATITIS C SCREENING  Completed   • Pneumococcal Vaccine 65+  Completed       Orders Placed This Encounter   Procedures   • Mammo Screening, Bilateral with CAD (Annually)     Use HCPCS/CPT Code 95136     Standing Status:   Future     Standing Expiration Date:   8/23/2023     Order Specific Question:   Reason for Exam:     Answer:   screen for breast cancer       No follow-ups on file.

## 2022-08-24 LAB
BASOPHILS # BLD AUTO: 0.1 X10E3/UL (ref 0–0.2)
BASOPHILS NFR BLD AUTO: 2 %
CHOLEST SERPL-MCNC: 215 MG/DL (ref 100–199)
EOSINOPHIL # BLD AUTO: 0.3 X10E3/UL (ref 0–0.4)
EOSINOPHIL NFR BLD AUTO: 4 %
ERYTHROCYTE [DISTWIDTH] IN BLOOD BY AUTOMATED COUNT: 12.5 % (ref 11.7–15.4)
HCT VFR BLD AUTO: 34.4 % (ref 34–46.6)
HDLC SERPL-MCNC: 47 MG/DL
HGB BLD-MCNC: 10.9 G/DL (ref 11.1–15.9)
IMM GRANULOCYTES # BLD AUTO: 0 X10E3/UL (ref 0–0.1)
IMM GRANULOCYTES NFR BLD AUTO: 1 %
LDLC SERPL CALC-MCNC: 137 MG/DL (ref 0–99)
LYMPHOCYTES # BLD AUTO: 2.5 X10E3/UL (ref 0.7–3.1)
LYMPHOCYTES NFR BLD AUTO: 36 %
MCH RBC QN AUTO: 27.7 PG (ref 26.6–33)
MCHC RBC AUTO-ENTMCNC: 31.7 G/DL (ref 31.5–35.7)
MCV RBC AUTO: 87 FL (ref 79–97)
MONOCYTES # BLD AUTO: 0.8 X10E3/UL (ref 0.1–0.9)
MONOCYTES NFR BLD AUTO: 11 %
NEUTROPHILS # BLD AUTO: 3.3 X10E3/UL (ref 1.4–7)
NEUTROPHILS NFR BLD AUTO: 46 %
PLATELET # BLD AUTO: 489 X10E3/UL (ref 150–450)
RBC # BLD AUTO: 3.94 X10E6/UL (ref 3.77–5.28)
TRIGL SERPL-MCNC: 174 MG/DL (ref 0–149)
VLDLC SERPL CALC-MCNC: 31 MG/DL (ref 5–40)
WBC # BLD AUTO: 7.1 X10E3/UL (ref 3.4–10.8)

## 2022-08-25 ENCOUNTER — TELEPHONE (OUTPATIENT)
Dept: INTERNAL MEDICINE | Facility: CLINIC | Age: 77
End: 2022-08-25

## 2022-08-25 NOTE — TELEPHONE ENCOUNTER
Caller: Deja Juárez    Relationship: Self    Best call back number: [phone]       What is the best time to reach you: ANYTIME     Who are you requesting to speak with (clinical staff, provider,  specific staff member): CLINICAL STAFF         What was the call regarding: PATIENT WOULD LIKE TO MAKE SURE HER RESULTS FROM HIS LABS ON 8/23/22 ARE MAILED TO HER HOME ADDRESS.     Do you require a callback: YES

## 2022-08-26 ENCOUNTER — TRANSCRIBE ORDERS (OUTPATIENT)
Dept: ADMINISTRATIVE | Facility: HOSPITAL | Age: 77
End: 2022-08-26

## 2022-08-26 DIAGNOSIS — Z12.31 BREAST CANCER SCREENING BY MAMMOGRAM: Primary | ICD-10-CM

## 2022-08-31 ENCOUNTER — TELEPHONE (OUTPATIENT)
Dept: CARDIOLOGY | Facility: CLINIC | Age: 77
End: 2022-08-31

## 2022-08-31 ENCOUNTER — HOSPITAL ENCOUNTER (OUTPATIENT)
Dept: CARDIOLOGY | Facility: HOSPITAL | Age: 77
Discharge: HOME | End: 2022-08-31
Admitting: NURSE PRACTITIONER

## 2022-08-31 VITALS
WEIGHT: 164.02 LBS | SYSTOLIC BLOOD PRESSURE: 135 MMHG | BODY MASS INDEX: 27.33 KG/M2 | HEIGHT: 65 IN | HEART RATE: 64 BPM | DIASTOLIC BLOOD PRESSURE: 75 MMHG

## 2022-08-31 DIAGNOSIS — I34.0 NONRHEUMATIC MITRAL VALVE REGURGITATION: ICD-10-CM

## 2022-08-31 DIAGNOSIS — R00.2 PALPITATIONS: ICD-10-CM

## 2022-08-31 LAB
AORTIC ARCH: 1.6 CM
ASCENDING AORTA: 2.2 CM
BH CV ECHO MEAS - ACS: 1.91 CM
BH CV ECHO MEAS - AO MAX PG: 15.7 MMHG
BH CV ECHO MEAS - AO MEAN PG: 8.3 MMHG
BH CV ECHO MEAS - AO ROOT DIAM: 3.1 CM
BH CV ECHO MEAS - AO V2 MAX: 198.2 CM/SEC
BH CV ECHO MEAS - AO V2 VTI: 44.6 CM
BH CV ECHO MEAS - AVA(I,D): 2.16 CM2
BH CV ECHO MEAS - EDV(CUBED): 171.8 ML
BH CV ECHO MEAS - EDV(MOD-SP2): 99 ML
BH CV ECHO MEAS - EDV(MOD-SP4): 82 ML
BH CV ECHO MEAS - EF(MOD-BP): 60.4 %
BH CV ECHO MEAS - EF(MOD-SP2): 59.6 %
BH CV ECHO MEAS - EF(MOD-SP4): 61 %
BH CV ECHO MEAS - ESV(CUBED): 74.8 ML
BH CV ECHO MEAS - ESV(MOD-SP2): 40 ML
BH CV ECHO MEAS - ESV(MOD-SP4): 32 ML
BH CV ECHO MEAS - FS: 24.2 %
BH CV ECHO MEAS - IVS/LVPW: 1.02 CM
BH CV ECHO MEAS - IVSD: 0.65 CM
BH CV ECHO MEAS - LAT PEAK E' VEL: 7.8 CM/SEC
BH CV ECHO MEAS - LV DIASTOLIC VOL/BSA (35-75): 45.1 CM2
BH CV ECHO MEAS - LV MASS(C)D: 123.2 GRAMS
BH CV ECHO MEAS - LV MAX PG: 7.8 MMHG
BH CV ECHO MEAS - LV MEAN PG: 4.1 MMHG
BH CV ECHO MEAS - LV SYSTOLIC VOL/BSA (12-30): 17.6 CM2
BH CV ECHO MEAS - LV V1 MAX: 139.4 CM/SEC
BH CV ECHO MEAS - LV V1 VTI: 32.9 CM
BH CV ECHO MEAS - LVIDD: 5.6 CM
BH CV ECHO MEAS - LVIDS: 4.2 CM
BH CV ECHO MEAS - LVOT AREA: 2.9 CM2
BH CV ECHO MEAS - LVOT DIAM: 1.93 CM
BH CV ECHO MEAS - LVPWD: 0.63 CM
BH CV ECHO MEAS - MED PEAK E' VEL: 6 CM/SEC
BH CV ECHO MEAS - MV A DUR: 0.15 SEC
BH CV ECHO MEAS - MV A MAX VEL: 51.4 CM/SEC
BH CV ECHO MEAS - MV DEC SLOPE: 240.1 CM/SEC2
BH CV ECHO MEAS - MV DEC TIME: 0.34 MSEC
BH CV ECHO MEAS - MV E MAX VEL: 87.3 CM/SEC
BH CV ECHO MEAS - MV E/A: 1.7
BH CV ECHO MEAS - MV MAX PG: 3.9 MMHG
BH CV ECHO MEAS - MV MEAN PG: 2.6 MMHG
BH CV ECHO MEAS - MV P1/2T: 121.2 MSEC
BH CV ECHO MEAS - MV V2 VTI: 34.8 CM
BH CV ECHO MEAS - MVA(P1/2T): 1.82 CM2
BH CV ECHO MEAS - MVA(VTI): 2.8 CM2
BH CV ECHO MEAS - PA ACC TIME: 0.14 SEC
BH CV ECHO MEAS - PA PR(ACCEL): 15.6 MMHG
BH CV ECHO MEAS - PA V2 MAX: 93.8 CM/SEC
BH CV ECHO MEAS - PULM A REVS DUR: 0.19 SEC
BH CV ECHO MEAS - PULM A REVS VEL: 27.6 CM/SEC
BH CV ECHO MEAS - PULM DIAS VEL: 31.5 CM/SEC
BH CV ECHO MEAS - PULM S/D: 1.91
BH CV ECHO MEAS - PULM SYS VEL: 60.1 CM/SEC
BH CV ECHO MEAS - QP/QS: 0.5
BH CV ECHO MEAS - RAP SYSTOLE: 3 MMHG
BH CV ECHO MEAS - RV MAX PG: 2.23 MMHG
BH CV ECHO MEAS - RV V1 MAX: 74.7 CM/SEC
BH CV ECHO MEAS - RV V1 VTI: 17.7 CM
BH CV ECHO MEAS - RVOT DIAM: 1.86 CM
BH CV ECHO MEAS - RVSP: 20.5 MMHG
BH CV ECHO MEAS - SI(MOD-SP2): 32.5 ML/M2
BH CV ECHO MEAS - SI(MOD-SP4): 27.5 ML/M2
BH CV ECHO MEAS - SUP REN AO DIAM: 2.1 CM
BH CV ECHO MEAS - SV(LVOT): 96.5 ML
BH CV ECHO MEAS - SV(MOD-SP2): 59 ML
BH CV ECHO MEAS - SV(MOD-SP4): 50 ML
BH CV ECHO MEAS - SV(RVOT): 48 ML
BH CV ECHO MEAS - TAPSE (>1.6): 2.39 CM
BH CV ECHO MEAS - TR MAX PG: 17.5 MMHG
BH CV ECHO MEAS - TR MAX VEL: 209 CM/SEC
BH CV ECHO MEASUREMENTS AVERAGE E/E' RATIO: 12.65
BH CV XLRA - RV BASE: 3.5 CM
BH CV XLRA - RV LENGTH: 6.6 CM
BH CV XLRA - RV MID: 2.48 CM
BH CV XLRA - TDI S': 10.8 CM/SEC
LEFT ATRIUM VOLUME INDEX: 23.1 ML/M2
LV EF 2D ECHO EST: 60 %
MAXIMAL PREDICTED HEART RATE: 144 BPM
SINUS: 2.6 CM
STJ: 2.27 CM
STRESS TARGET HR: 122 BPM

## 2022-08-31 PROCEDURE — 93306 TTE W/DOPPLER COMPLETE: CPT | Performed by: INTERNAL MEDICINE

## 2022-08-31 PROCEDURE — 93306 TTE W/DOPPLER COMPLETE: CPT

## 2022-08-31 NOTE — TELEPHONE ENCOUNTER
Her echocardiogram looks good; no changes. She has some calcium on her aortic valve but no leaking or narrowing. We'll call her with her monitor results.     Thanks!  FRIDA Gill

## 2022-09-21 DIAGNOSIS — R00.2 PALPITATIONS: ICD-10-CM

## 2022-09-21 DIAGNOSIS — I49.3 PVC (PREMATURE VENTRICULAR CONTRACTION): ICD-10-CM

## 2022-09-21 RX ORDER — METOPROLOL SUCCINATE 100 MG/1
100 TABLET, EXTENDED RELEASE ORAL DAILY
Qty: 30 TABLET | Refills: 11 | Status: SHIPPED | OUTPATIENT
Start: 2022-09-21

## 2022-10-24 ENCOUNTER — HOSPITAL ENCOUNTER (OUTPATIENT)
Dept: MAMMOGRAPHY | Facility: HOSPITAL | Age: 77
Discharge: HOME | End: 2022-10-24
Admitting: FAMILY MEDICINE

## 2022-10-24 DIAGNOSIS — Z12.31 BREAST CANCER SCREENING BY MAMMOGRAM: ICD-10-CM

## 2022-10-24 PROCEDURE — 77067 SCR MAMMO BI INCL CAD: CPT

## 2022-10-24 PROCEDURE — 77063 BREAST TOMOSYNTHESIS BI: CPT

## 2022-10-26 ENCOUNTER — OFFICE VISIT (OUTPATIENT)
Dept: CARDIOLOGY | Facility: CLINIC | Age: 77
End: 2022-10-26

## 2022-10-26 VITALS
WEIGHT: 164 LBS | SYSTOLIC BLOOD PRESSURE: 134 MMHG | OXYGEN SATURATION: 97 % | BODY MASS INDEX: 28 KG/M2 | HEIGHT: 64 IN | HEART RATE: 50 BPM | DIASTOLIC BLOOD PRESSURE: 70 MMHG

## 2022-10-26 DIAGNOSIS — E78.5 HYPERLIPIDEMIA, UNSPECIFIED HYPERLIPIDEMIA TYPE: Chronic | ICD-10-CM

## 2022-10-26 DIAGNOSIS — I49.1 PAC (PREMATURE ATRIAL CONTRACTION): ICD-10-CM

## 2022-10-26 DIAGNOSIS — I48.0 PAROXYSMAL ATRIAL FIBRILLATION: Primary | ICD-10-CM

## 2022-10-26 DIAGNOSIS — M17.12 PRIMARY LOCALIZED OSTEOARTHRITIS OF LEFT KNEE: ICD-10-CM

## 2022-10-26 DIAGNOSIS — I34.0 NONRHEUMATIC MITRAL VALVE REGURGITATION: ICD-10-CM

## 2022-10-26 DIAGNOSIS — I49.3 PVC (PREMATURE VENTRICULAR CONTRACTION): ICD-10-CM

## 2022-10-26 DIAGNOSIS — F41.9 ANXIETY: ICD-10-CM

## 2022-10-26 PROCEDURE — 93000 ELECTROCARDIOGRAM COMPLETE: CPT | Performed by: NURSE PRACTITIONER

## 2022-10-26 PROCEDURE — 99213 OFFICE O/P EST LOW 20 MIN: CPT | Performed by: NURSE PRACTITIONER

## 2022-10-26 NOTE — PROGRESS NOTES
[facility] CARDIOLOGY  [address]  [facility] [address]  [facility] [address]  Phone: [phone]      Patient Name: Deja Juárez  :1945  Age: 77 y.o.  Primary Cardiologist: Lucy Garcia MD  Encounter Provider:  FRIDA Fontenot      Chief Complaint     Chief Complaint: Follow-up and hld      SUBJECTIVE     History of Present Illness:  Deja Juárez is a 77 y.o.  female whose medical history includes anxiety, depression, hyperlipidemia, scoliosis, and osteoarthritis. She is followed in our office by Dr. Garcia for frequent PVC's.     10/27/22 Follow-up:  She is here for follow up of testing. Atrial fibrillation could not be ruled out on heart monitor and she was started on apixaban. She is feeling very good and does not have palpitations. She is very active and has not limiting symptoms. She denies chest pain, dyspnea with exertion, orthopnea, palpitations, lightheadedness or leg swelling. Medication are being taken as prescribed.     Below is a summary of pertinent cardiology findings:  • Former smoker; quit 1980s. Denies drugs or alcohol. Drinks one cup of coffee daily. She is  and has children; has 2 grandchildren. She is retired. Family history includes both parents with hypertension; diabetes in her father.  • 2/10/2017 Echocardiogram showed EF 69%, grade 2 diastolic dysfunction, and mild mitral insufficiency. Myocardial perfusion stress test was non-ischemic.   • 2017 Holter monitor showed PVCs, PACs, and one run of PSVT. She was started on metoprolol.   • 3/4/2019 Echocardiogram showed EF 57%, grade 1 diastolic dysfunction, mild mitral insufficiency, and thickened aortic valve. Holter monitor showed frequent PVCs and occasional PACs.   • 2022 CTA chest no evidence of PE, lungs are clear.  • 2022 echocardiogram showed EF 60%, normal LV diastolic function, normal RVSP, trace tricuspid regurgitation, and mild calcification of  the aortic valve without regurgitation or stenosis.  • 2022 coronary artery calcium score 75 Agatston units units with 59 9 units in the LAD, 8 in the left circumflex, and 8 in the RCA.  Her rosuvastatin was increased to 10 mg daily and she was started on aspirin.  • 2022 14-day heart monitor showed the predominant rhythm to be sinus rhythm with average heart rate 68 bpm, 1 episode of VT lasting 5 beats with no symptoms, 88 episodes of SVT with the longest lasting 96 beats and possibly atrial fibrillation, and PACs occurring 2.9% of the time.  She was started on apixaban.    Past Medical History:   Diagnosis Date   • Anxiety    • Depression    • GERD (gastroesophageal reflux disease)    • Hyperlipidemia    • Hypertension    • Scoliosis        Past Surgical History:  · Left eye surgery 2019  · Bilateral knee replacements;  and     Social History     Socioeconomic History   • Marital status:    Tobacco Use   • Smoking status: Former     Packs/day: 1.00     Years: 5.00     Pack years: 5.00     Types: Cigarettes     Quit date: 1960     Years since quittin.8   • Smokeless tobacco: Never   • Tobacco comments:     CAFFEINE USE   Vaping Use   • Vaping Use: Never used   Substance and Sexual Activity   • Alcohol use: No   • Drug use: No         Review of Systems     Review of Systems   Cardiovascular: Negative for chest pain, dyspnea on exertion, irregular heartbeat, leg swelling, near-syncope, orthopnea, palpitations and syncope.   Musculoskeletal: Positive for joint pain.       Medications     Allergies as of 10/26/2022   • (No Known Allergies)       Current Outpatient Medications on File Prior to Visit   Medication Sig   • apixaban (ELIQUIS) 5 MG tablet tablet Take 1 tablet by mouth 2 (Two) Times a Day.   • aspirin (aspirin) 81 MG EC tablet Take 1 tablet by mouth Daily.   • calcium citrate (CALCITRATE) 950 (200 Ca) MG tablet Take 1 tablet by mouth Daily.   • Cholecalciferol (VITAMIN D)  "1000 UNITS tablet Take 1,000 Units by mouth.   • famotidine (PEPCID) 20 MG tablet Take 20 mg by mouth.   • metoprolol succinate XL (TOPROL-XL) 100 MG 24 hr tablet Take 1 tablet by mouth Daily.   • Multiple Vitamin (MULTIVITAMIN) tablet Take 1 tablet by mouth Daily.   • rosuvastatin (Crestor) 10 MG tablet Take 1 tablet by mouth Every Night.   • sertraline (Zoloft) 25 MG tablet Take 1 tablet by mouth Daily.     No current facility-administered medications on file prior to visit.          OBJECTIVE     Vital Signs:   /70   Pulse 50   Ht 162.6 cm (64\")   Wt 74.4 kg (164 lb)   SpO2 97%   BMI 28.15 kg/m²       Weight:  Wt Readings from Last 3 Encounters:   10/26/22 74.4 kg (164 lb)   08/31/22 74.4 kg (164 lb 0.4 oz)   08/23/22 74.4 kg (164 lb)     Body mass index is 28.15 kg/m².      Physical Exam     Physical Exam  Constitutional:       General: She is not in acute distress.  HENT:      Head: Normocephalic and atraumatic.      Mouth/Throat:      Mouth: Mucous membranes are moist.   Eyes:      General: No scleral icterus.     Extraocular Movements: Extraocular movements intact.      Conjunctiva/sclera: Conjunctivae normal.      Pupils: Pupils are equal, round, and reactive to light.   Cardiovascular:      Rate and Rhythm: Regular rhythm. Bradycardia present.      Pulses: Normal pulses.      Heart sounds: S1 normal and S2 normal.   Pulmonary:      Effort: No respiratory distress.      Breath sounds: Normal breath sounds. No wheezing, rhonchi or rales.   Abdominal:      General: Bowel sounds are normal. There is no distension.      Palpations: Abdomen is soft.      Tenderness: There is no abdominal tenderness.   Musculoskeletal:         General: Normal range of motion.      Cervical back: Normal range of motion and neck supple.      Right lower leg: No edema.      Left lower leg: No edema.   Skin:     General: Skin is warm and dry.      Coloration: Skin is not jaundiced.   Neurological:      Mental Status: She is " alert and oriented to person, place, and time.   Psychiatric:         Mood and Affect: Mood normal.         Reviewed Data     Result Review :  The following data was reviewed by FRIDA Fontenot on 10/27/22:  • Labs 08/23/2022:  Chol 215, HDL 47, , Trig 174, Hgb 10.9, Plt 49  • Labs 08/06/2022:  cr 0.7, K 3.6, otherwise unremarkable CMP, Mg 2.1, Hgb 9.0, Plt 532, troponin <0.010, proBNP 1032   • Labs 01/21/2022:  Chol 255, HDL 59, , Trig 172       ECG 12 Lead    Date/Time: 10/26/2022 10:19 AM  Performed by: Coni Jones APRN  Authorized by: Coni Jones APRN   Comparison: compared with previous ECG from 8/15/2022  Similar to previous ECG  Rhythm: sinus rhythm  Ectopy: atrial premature contractions  Rate: normal  BPM: 50  T inversion: V2, V3, V4 and V5  Other findings: T wave abnormality    Clinical impression: abnormal EKG            Assessment and Plan        Assessment and Plan     Assessment:  1. Paroxysmal atrial fibrillation (HCC)    2. PAC (premature atrial contraction)    3. PVC (premature ventricular contraction)    4. Nonrheumatic mitral valve regurgitation    5. Hyperlipidemia, unspecified hyperlipidemia type    6. Anxiety    7. Primary localized osteoarthritis of left knee         1. Paroxysmal Afib:  Runs of Afib could not be ruled out on September 2022 heart monitor. She is anticoagulated with apixaban. She is rate controlled and 100 mg Torpol daily.   2. PACs: PAC burden was 2.9% on September 2022 heart monitor  3. PVCs: Holter monitor from March 2019 showed frequent PVCs; 1 episode of VT on September 2022 heart monitor.   4. Mitral valve insufficiency: This was graded as mild on echocardiogram from March 2019. Stable on August 2022 echocardiogram.  5. Coronary artery calcification:  August 2022 coronary artery calcium score 75 Agatston units units with 59 9 units in the LAD, 8 in the left circumflex, and 8 in the RCA.  Her rosuvastatin was  increased to 10 mg daily and she was started on aspirin.  6. Hyperlipidemia: Lipids not at goal when checked in January 2022.  Rosuvastatin increased due to abnormal coronary artery calcium score.  7. Anxiety: She had not been taking her anxiety medications postoperatively even wonder if this contributes to her episodes of her heart racing.  She denies any increased anxiety. She feels better on metoprolol.   8. Osteoarthritis: She has had bilateral knee replacements and recently had right hip replacement.  She is undergoing physical therapy. She remains active.       Plan:  1. I made no medication changes today.   2. She will keep her March 2023 appointment with Dr. Garcia.       Follow Up:  Return for Keep March 2023 appointment with Dr. Garcia.  Orders Placed This Encounter   Procedures   • ECG 12 Lead      No orders of the defined types were placed in this encounter.        Thank you the opportunity to participate in this patient's care.    FRIDA Bryant    This office note has been dictated.

## 2022-10-30 DIAGNOSIS — R92.8 ABNORMAL MAMMOGRAM: Primary | ICD-10-CM

## 2022-12-16 ENCOUNTER — HOSPITAL ENCOUNTER (OUTPATIENT)
Dept: ULTRASOUND IMAGING | Facility: HOSPITAL | Age: 77
Discharge: HOME | End: 2022-12-16

## 2022-12-16 ENCOUNTER — HOSPITAL ENCOUNTER (OUTPATIENT)
Dept: MAMMOGRAPHY | Facility: HOSPITAL | Age: 77
Discharge: HOME | End: 2022-12-16

## 2022-12-16 DIAGNOSIS — R92.8 ABNORMAL MAMMOGRAM: ICD-10-CM

## 2022-12-16 PROCEDURE — 76642 ULTRASOUND BREAST LIMITED: CPT

## 2022-12-16 PROCEDURE — G0279 TOMOSYNTHESIS, MAMMO: HCPCS

## 2022-12-16 PROCEDURE — 77065 DX MAMMO INCL CAD UNI: CPT

## 2023-01-20 ENCOUNTER — TELEPHONE (OUTPATIENT)
Dept: CARDIOLOGY | Facility: CLINIC | Age: 78
End: 2023-01-20
Payer: MEDICARE

## 2023-01-20 NOTE — TELEPHONE ENCOUNTER
Patient called and wanted to know if we have any Eliquis samples? She is seen at the main office.  Please advise.    CB: [phone]    Thanks,  Juan M

## 2023-01-21 DIAGNOSIS — F41.9 ANXIETY: ICD-10-CM

## 2023-01-23 RX ORDER — SERTRALINE HYDROCHLORIDE 25 MG/1
TABLET, FILM COATED ORAL
Qty: 30 TABLET | Refills: 3 | Status: SHIPPED | OUTPATIENT
Start: 2023-01-23

## 2023-01-24 NOTE — TELEPHONE ENCOUNTER
Called and left a message on voicemail letting her know that the samples are at the front for her to  at the mail location.    Juan M

## 2023-03-20 ENCOUNTER — TELEPHONE (OUTPATIENT)
Dept: CARDIOLOGY | Facility: CLINIC | Age: 78
End: 2023-03-20
Payer: MEDICARE

## 2023-03-20 NOTE — TELEPHONE ENCOUNTER
I spoke to the pt. She is aware that we have samples and will be ready for  at the [facility] location.

## 2023-03-20 NOTE — TELEPHONE ENCOUNTER
Caller: Deja Juárez    Relationship: Self    Best call back number: [phone]    PATIENT IS REQUESTING A CALL BACK ABOUT SAMPLES OF ELIQUIS, SHE WOULD PREFER TO COME GET THEM FROM THE [facility] LOCATION IF AVAILABLE. IF NOT, SHE WILL GET THEM FROM THE [facility] OFFICE. PLEASE CALL HER TO LET HER KNOW WHICH ONE.

## 2023-03-21 ENCOUNTER — OFFICE VISIT (OUTPATIENT)
Dept: FAMILY MEDICINE CLINIC | Facility: CLINIC | Age: 78
End: 2023-03-21
Payer: MEDICARE

## 2023-03-21 VITALS
WEIGHT: 169 LBS | OXYGEN SATURATION: 96 % | TEMPERATURE: 98.7 F | BODY MASS INDEX: 28.85 KG/M2 | HEART RATE: 57 BPM | SYSTOLIC BLOOD PRESSURE: 130 MMHG | DIASTOLIC BLOOD PRESSURE: 78 MMHG | RESPIRATION RATE: 16 BRPM | HEIGHT: 64 IN

## 2023-03-21 DIAGNOSIS — D64.9 ANEMIA, UNSPECIFIED TYPE: ICD-10-CM

## 2023-03-21 DIAGNOSIS — H91.90 HEARING LOSS, UNSPECIFIED HEARING LOSS TYPE, UNSPECIFIED LATERALITY: ICD-10-CM

## 2023-03-21 DIAGNOSIS — E78.5 HYPERLIPIDEMIA, UNSPECIFIED HYPERLIPIDEMIA TYPE: Primary | Chronic | ICD-10-CM

## 2023-03-21 PROCEDURE — 1159F MED LIST DOCD IN RCRD: CPT | Performed by: FAMILY MEDICINE

## 2023-03-21 PROCEDURE — 1160F RVW MEDS BY RX/DR IN RCRD: CPT | Performed by: FAMILY MEDICINE

## 2023-03-21 PROCEDURE — 99214 OFFICE O/P EST MOD 30 MIN: CPT | Performed by: FAMILY MEDICINE

## 2023-03-21 NOTE — PROGRESS NOTES
"    Chief Complaint  Hyperlipidemia (F/u)    Subjective    History of Present Illness {CC  Problem List  Visit  Diagnosis   Encounters  Notes  Medications  Labs  Result Review Imaging  Media :23}     Deja Juárez presents to [facility] for   History of Present Illness     79 yo female present for follow up visit. She states feeling well.   She has notice changes with hearing.    She is taking medication as prescribed. Trying to monitor diet.        Social History     Socioeconomic History   • Marital status:    Tobacco Use   • Smoking status: Former     Packs/day: 1.00     Years: 5.00     Pack years: 5.00     Types: Cigarettes     Quit date: 1960     Years since quittin.3   • Smokeless tobacco: Never   • Tobacco comments:     CAFFEINE USE   Vaping Use   • Vaping Use: Never used   Substance and Sexual Activity   • Alcohol use: No   • Drug use: No      Objective     Vital Signs:   /78 (BP Location: Right arm, Patient Position: Sitting, Cuff Size: Adult)   Pulse 57   Temp 98.7 °F (37.1 °C) (Infrared)   Resp 16   Ht 162.6 cm (64.02\")   Wt 76.7 kg (169 lb)   SpO2 96%   BMI 28.99 kg/m²   Physical Exam  Constitutional:       General: She is not in acute distress.     Appearance: She is not ill-appearing.   HENT:      Head: Normocephalic.   Cardiovascular:      Rate and Rhythm: Regular rhythm.      Heart sounds: Normal heart sounds.   Pulmonary:      Effort: No respiratory distress.      Breath sounds: Normal breath sounds.   Musculoskeletal:      Right lower leg: No edema.      Left lower leg: No edema.   Neurological:      General: No focal deficit present.      Mental Status: She is alert.        Result Review  Data Reviewed:{ Labs  Result Review  Imaging  Med Tab  Media :23}   The following data was reviewed by: Stephanie Waller MD on 2023  Lab Results - Last 18 Months   Lab Units 22  1201 22  1837 22  1001 22  0855 "   BUN mg/dL  --  12  --  15   CREATININE mg/dL  --  0.71  --  0.72   EGFR IF NONAFRICN AM mL/min/1.73  --   --   --  82   EGFR IF AFRICN AM mL/min/1.73  --   --   --  94   SODIUM mmol/L  --  139  --  137   POTASSIUM mmol/L  --  3.6  --  4.7   CHLORIDE mmol/L  --  98  --  99   CALCIUM mg/dL  --  9.1  --  9.7   ALBUMIN g/dL  --  3.90  --  4.5   BILIRUBIN mg/dL  --  0.3  --  0.8   ALK PHOS U/L  --  139*  --  88   AST (SGOT) U/L  --  17  --  16   ALT (SGPT) U/L  --  15  --  14   CHOLESTEROL mg/dL 215*  --   --  255*   TRIGLYCERIDES mg/dL 174*  --   --  172*   HDL CHOL mg/dL 47  --   --  59   VLDL CHOLESTEROL RATNA mg/dL 31  --   --  31   LDL CHOL mg/dL 137*  --   --  165*   WBC x10E3/uL 7.1 12.93* 7.31  --    RBC x10E6/uL 3.94 3.14* 4.75  --    HEMATOCRIT % 34.4 27.2* 42.9  --    MCV fL 87 86.6 90.3  --    MCH pg 27.7 28.7 28.4  --               Current Outpatient Medications:   •  apixaban (ELIQUIS) 5 MG tablet tablet, Take 1 tablet by mouth 2 (Two) Times a Day., Disp: 56 tablet, Rfl: 0  •  Cholecalciferol (VITAMIN D) 1000 UNITS tablet, Take 1 tablet by mouth., Disp: , Rfl:   •  famotidine (PEPCID) 20 MG tablet, Take 1 tablet by mouth., Disp: , Rfl:   •  metoprolol succinate XL (TOPROL-XL) 100 MG 24 hr tablet, Take 1 tablet by mouth Daily., Disp: 30 tablet, Rfl: 11  •  rosuvastatin (Crestor) 10 MG tablet, Take 1 tablet by mouth Every Night., Disp: 90 tablet, Rfl: 3  •  sertraline (ZOLOFT) 25 MG tablet, TAKE ONE TABLET BY MOUTH DAILY, Disp: 30 tablet, Rfl: 3  •  aspirin (aspirin) 81 MG EC tablet, Take 1 tablet by mouth Daily. (Patient not taking: Reported on 3/21/2023), Disp: 30 tablet, Rfl: 11  •  calcium citrate (CALCITRATE) 950 (200 Ca) MG tablet, Take 1 tablet by mouth Daily. (Patient not taking: Reported on 3/21/2023), Disp: , Rfl:   •  Multiple Vitamin (MULTIVITAMIN) tablet, Take 1 tablet by mouth Daily. (Patient not taking: Reported on 3/21/2023), Disp: , Rfl:       Assessment and Plan {CC Problem List  Visit  Diagnosis  ROS  Review (Popup)  Health Maintenance  Quality  BestPractice  Medications  SmartSets  SnapShot Encounters  Media :23}   Problem List Items Addressed This Visit        Cardiac and Vasculature    Hyperlipidemia - Primary (Chronic)    Relevant Orders    Lipid panel (Completed)   Other Visit Diagnoses     Anemia, unspecified type        Relevant Orders    Comprehensive metabolic panel (Completed)    CBC w AUTO Differential (Completed)    Hearing loss, unspecified hearing loss type, unspecified laterality        Relevant Orders    Basic Vestibular Evaluation          Follow Up   Return in about 5 months (around 8/28/2023) for Medicare Wellness.  Patient was given instructions and counseling regarding her condition or for health maintenance advice. Please see specific information pulled into the AVS if appropriate.    EpicAct:[email or web address]_ORDERS,RunParams:STARTUPTYPE=FOLLOW    MR_[email or web address]_DISCHARGE

## 2023-03-22 ENCOUNTER — TELEPHONE (OUTPATIENT)
Dept: FAMILY MEDICINE CLINIC | Facility: CLINIC | Age: 78
End: 2023-03-22

## 2023-03-22 LAB
ALBUMIN SERPL-MCNC: 4.4 G/DL (ref 3.7–4.7)
ALBUMIN/GLOB SERPL: 1.8 {RATIO} (ref 1.2–2.2)
ALP SERPL-CCNC: 95 IU/L (ref 44–121)
ALT SERPL-CCNC: 14 IU/L (ref 0–32)
AST SERPL-CCNC: 18 IU/L (ref 0–40)
BASOPHILS # BLD AUTO: 0.1 X10E3/UL (ref 0–0.2)
BASOPHILS NFR BLD AUTO: 1 %
BILIRUB SERPL-MCNC: 0.7 MG/DL (ref 0–1.2)
BUN SERPL-MCNC: 11 MG/DL (ref 8–27)
BUN/CREAT SERPL: 15 (ref 12–28)
CALCIUM SERPL-MCNC: 9.7 MG/DL (ref 8.7–10.3)
CHLORIDE SERPL-SCNC: 100 MMOL/L (ref 96–106)
CHOLEST SERPL-MCNC: 218 MG/DL (ref 100–199)
CO2 SERPL-SCNC: 24 MMOL/L (ref 20–29)
CREAT SERPL-MCNC: 0.73 MG/DL (ref 0.57–1)
EGFRCR SERPLBLD CKD-EPI 2021: 85 ML/MIN/1.73
EOSINOPHIL # BLD AUTO: 0.2 X10E3/UL (ref 0–0.4)
EOSINOPHIL NFR BLD AUTO: 3 %
ERYTHROCYTE [DISTWIDTH] IN BLOOD BY AUTOMATED COUNT: 12.5 % (ref 11.7–15.4)
GLOBULIN SER CALC-MCNC: 2.5 G/DL (ref 1.5–4.5)
GLUCOSE SERPL-MCNC: 94 MG/DL (ref 70–99)
HCT VFR BLD AUTO: 40.7 % (ref 34–46.6)
HDLC SERPL-MCNC: 51 MG/DL
HGB BLD-MCNC: 13.8 G/DL (ref 11.1–15.9)
IMM GRANULOCYTES # BLD AUTO: 0 X10E3/UL (ref 0–0.1)
IMM GRANULOCYTES NFR BLD AUTO: 0 %
LDLC SERPL CALC-MCNC: 131 MG/DL (ref 0–99)
LYMPHOCYTES # BLD AUTO: 2.2 X10E3/UL (ref 0.7–3.1)
LYMPHOCYTES NFR BLD AUTO: 32 %
MCH RBC QN AUTO: 28.5 PG (ref 26.6–33)
MCHC RBC AUTO-ENTMCNC: 33.9 G/DL (ref 31.5–35.7)
MCV RBC AUTO: 84 FL (ref 79–97)
MONOCYTES # BLD AUTO: 0.6 X10E3/UL (ref 0.1–0.9)
MONOCYTES NFR BLD AUTO: 9 %
NEUTROPHILS # BLD AUTO: 3.9 X10E3/UL (ref 1.4–7)
NEUTROPHILS NFR BLD AUTO: 55 %
PLATELET # BLD AUTO: 351 X10E3/UL (ref 150–450)
POTASSIUM SERPL-SCNC: 4.9 MMOL/L (ref 3.5–5.2)
PROT SERPL-MCNC: 6.9 G/DL (ref 6–8.5)
RBC # BLD AUTO: 4.84 X10E6/UL (ref 3.77–5.28)
SODIUM SERPL-SCNC: 137 MMOL/L (ref 134–144)
TRIGL SERPL-MCNC: 201 MG/DL (ref 0–149)
VLDLC SERPL CALC-MCNC: 36 MG/DL (ref 5–40)
WBC # BLD AUTO: 6.9 X10E3/UL (ref 3.4–10.8)

## 2023-03-22 NOTE — TELEPHONE ENCOUNTER
Caller: Deja Juárez    Relationship to patient: Self    Best call back number: [phone]    Caller requesting test results: PATIENT    What test was performed: BLOOD WORK     When was the test performed: 03/21/23    Where was the test performed: OFFICE     Additional notes: REQUESTED CALLBACK    SHE SAID THAT THEY HAD NOT ANSWERED EARLIER TODAY BECAUSE THEY DID NOT REALIZE WHO WAS CALLING    SHE SAID THAT SHE WOULD ALSO LIKE A PAPER COPY OF THE RESULTS MAILED TO THEIR ADDRESS    [address] [place] [address]

## 2023-03-23 NOTE — TELEPHONE ENCOUNTER
Placed copies in outgoing mail as of 03/23/23, spoke w/ Mrs. Short to verify address and informed her labs are in the mail

## 2023-04-25 ENCOUNTER — OFFICE VISIT (OUTPATIENT)
Dept: CARDIOLOGY | Facility: CLINIC | Age: 78
End: 2023-04-25
Payer: MEDICARE

## 2023-04-25 VITALS
HEART RATE: 96 BPM | SYSTOLIC BLOOD PRESSURE: 140 MMHG | DIASTOLIC BLOOD PRESSURE: 80 MMHG | BODY MASS INDEX: 28.49 KG/M2 | HEIGHT: 65 IN | WEIGHT: 171 LBS | OXYGEN SATURATION: 98 %

## 2023-04-25 DIAGNOSIS — I48.0 PAF (PAROXYSMAL ATRIAL FIBRILLATION): ICD-10-CM

## 2023-04-25 DIAGNOSIS — R00.2 PALPITATIONS: Primary | ICD-10-CM

## 2023-04-25 DIAGNOSIS — E78.5 HYPERLIPIDEMIA, UNSPECIFIED HYPERLIPIDEMIA TYPE: Chronic | ICD-10-CM

## 2023-04-25 DIAGNOSIS — I49.3 PVC (PREMATURE VENTRICULAR CONTRACTION): ICD-10-CM

## 2023-04-25 RX ORDER — METOPROLOL SUCCINATE 50 MG/1
50 TABLET, EXTENDED RELEASE ORAL NIGHTLY
Qty: 90 TABLET | Refills: 3 | Status: SHIPPED | OUTPATIENT
Start: 2023-04-25

## 2023-04-25 NOTE — PROGRESS NOTES
Date of Office Visit: 2023  Encounter Provider: Lucy Garcia MD  Place of Service: [facility] CARDIOLOGY  Patient Name: Deja Juárez  :1945      Patient ID:  Deja Juárez is a 78 y.o. female is here for  followup for PVCs, PAF, coronary calcification        History of Present Illness    Deja Juráez has a history of anxiety, depression, hyperlipidemia, abnormal ECG and PAF.  She is followed for PAF and coronary calcification    I first saw her as a preoperative evaluation for orthopedic surgery in .     She quit smoking 47 years ago.  She is  and lives with her .  She is retired.  She has two grandchildren.  She drinks one cup of coffee per day.  No alcohol or drugs.       Diabetes is in her family in her father as well as hypertension being present in her mother and father.       She had an echocardiogram done 2/10/2017 showing ejection fraction 69% with grade 2 diastolic dysfunction, mild mitral insufficiency.  He had nonischemic stress nuclear perfusion study done 2017.  She had an echocardiogram done 3/4/2019 showing ejection fraction 57% with grade 1 diastolic dysfunction, mild mitral insufficiency, thickened aortic valve.  She had a Holter monitor done 2017 showing PVCs and PACs, one nonsustained run of atrial tachycardia otherwise normal sinus rhythm.  There were no pauses or high degree AV block.  This work-up was done for palpitations.  She is placed on metoprolol and has had no further palpitations.     Echo done 3/4/2019 showed ejection fraction 57% with grade 1 diastolic dysfunction, thickening of the aortic valve without insufficiency or stenosis, mild mitral insufficiency, aortic root sclerosis without dilation.  24-hour Holter done 3/4/2019 showed frequent unifocal PVCs with no ventricular tachycardia and occasional unifocal PACs without atrial fibrillation.  She had a 14-day monitor done 2022 which showed 1  episode of ventricular tachycardia lasting 5 beats, 88 episodes supraventricular tachycardia.  The longest episode was 53 seconds of SVT and looks like it could have been atrial fibrillation.  There were 2.9% PACs noted. Echo done 8/31/2022 showed ejection fraction 60% with mild calcification of the aortic valve normal diastolic function, chamber sizes.  Calcium score done 8/2018 showed a total agaston score of 75.  This was mostly concentrated in the LAD.  She was started on Eliquis for PAF.     Labs on 3/21/2023 show normal CMP, total cholesterol 218, HDL 51, , triglycerides 201, normal CBC.  She has no chest pain or pressure.  She has no difficulty breathing.  She does not feel her heart racing or skipping.  She has had no dizziness, syncope or falls.  She has no orthopnea or PND.  She is taking her medications as directed without difficulty.  She was having significant fatigue and thought this was due to Eliquis to start taking it just once a day.  That she thought this was due to the metoprolol.    Past Medical History:   Diagnosis Date   • Anxiety    • Depression    • GERD (gastroesophageal reflux disease)    • Hyperlipidemia    • Hypertension    • Scoliosis          Past Surgical History:   Procedure Laterality Date   • EYE SURGERY Left 02/06/2019q   • HIP SURGERY Right    • JOINT REPLACEMENT      bilateral knee replacement   • LIPOMA EXCISION      from shoulder   • TOTAL KNEE ARTHROPLASTY Left 2017       Current Outpatient Medications on File Prior to Visit   Medication Sig Dispense Refill   • apixaban (ELIQUIS) 5 MG tablet tablet Take 1 tablet by mouth 2 (Two) Times a Day. (Patient taking differently: Take 1 tablet by mouth Daily.) 56 tablet 0   • calcium citrate (CALCITRATE) 950 (200 Ca) MG tablet Take 1 tablet by mouth Daily.     • Cholecalciferol (VITAMIN D) 1000 UNITS tablet Take 1 tablet by mouth.     • famotidine (PEPCID) 20 MG tablet Take 1 tablet by mouth.     • metoprolol succinate XL  "(TOPROL-XL) 100 MG 24 hr tablet Take 1 tablet by mouth Daily. 30 tablet 11   • rosuvastatin (Crestor) 10 MG tablet Take 1 tablet by mouth Every Night. 90 tablet 3   • sertraline (ZOLOFT) 25 MG tablet TAKE ONE TABLET BY MOUTH DAILY 30 tablet 3   • [DISCONTINUED] aspirin (aspirin) 81 MG EC tablet Take 1 tablet by mouth Daily. 30 tablet 11   • [DISCONTINUED] Multiple Vitamin (MULTIVITAMIN) tablet Take 1 tablet by mouth Daily.       No current facility-administered medications on file prior to visit.       Social History     Socioeconomic History   • Marital status:    Tobacco Use   • Smoking status: Former     Packs/day: 1.00     Years: 5.00     Pack years: 5.00     Types: Cigarettes     Quit date: 1960     Years since quittin.3   • Smokeless tobacco: Never   • Tobacco comments:     CAFFEINE USE   Vaping Use   • Vaping Use: Never used   Substance and Sexual Activity   • Alcohol use: No   • Drug use: No           ROS    Procedures    ECG 12 Lead    Date/Time: 2023 10:53 AM  Performed by: Lucy Garcia MD  Authorized by: Lucy Garcia MD   Comparison: compared with previous ECG   Similar to previous ECG  Rhythm: sinus rhythm  T inversion: I, aVL, V2, V3, V4, V5 and V6    Clinical impression: abnormal EKG                Objective:      Vitals:    23 1018   BP: 140/80   Pulse: 96   SpO2: 98%   Weight: 77.6 kg (171 lb)   Height: 165.1 cm (65\")     Body mass index is 28.46 kg/m².    Vitals reviewed.   Constitutional:       General: Not in acute distress.     Appearance: Well-developed. Not diaphoretic.   Eyes:      General: No scleral icterus.     Conjunctiva/sclera: Conjunctivae normal.   HENT:      Head: Normocephalic and atraumatic.   Neck:      Thyroid: No thyromegaly.      Vascular: No carotid bruit or JVD.      Lymphadenopathy: No cervical adenopathy.   Pulmonary:      Effort: Pulmonary effort is normal. No respiratory distress.      Breath sounds: Normal breath sounds. No " wheezing. No rhonchi. No rales.   Chest:      Chest wall: Not tender to palpatation.   Cardiovascular:      Normal rate. Regular rhythm.      Murmurs: There is no murmur.      No gallop.   Pulses:     Intact distal pulses.   Edema:     Peripheral edema absent.   Abdominal:      General: Bowel sounds are normal. There is no distension or abdominal bruit.      Palpations: Abdomen is soft. There is no abdominal mass.      Tenderness: There is no abdominal tenderness.   Musculoskeletal:         General: No deformity.      Extremities: No clubbing present.     Cervical back: Neck supple. Skin:     General: Skin is warm and dry. There is no cyanosis.      Coloration: Skin is not pale.      Findings: No rash.   Neurological:      Mental Status: Alert and oriented to person, place, and time.      Cranial Nerves: No cranial nerve deficit.   Psychiatric:         Judgment: Judgment normal.         Lab Review:       Assessment:      Diagnosis Plan   1. Palpitations        2. Hyperlipidemia, unspecified hyperlipidemia type          1. Abnormal EKG with anterior T-wave inversions.  stable.   2. PACs and PVCs, treated with metoprolol.  She was having palpitations.  She does not have these anymore.  3. Hyperlipidemia.  On rosuvastatin, started recently.    4. PAF, on Eliquis   5. Coronary calcification, mild        Plan:       See Natalia in 6 months, decrease metoprolol to 50 mg nightly due to fatigue with this, advised her to take her Eliquis twice daily as she was only taking it once daily, overall doing well.

## 2023-06-15 ENCOUNTER — OFFICE VISIT (OUTPATIENT)
Dept: FAMILY MEDICINE CLINIC | Facility: CLINIC | Age: 78
End: 2023-06-15
Payer: MEDICARE

## 2023-06-15 ENCOUNTER — HOSPITAL ENCOUNTER (OUTPATIENT)
Dept: GENERAL RADIOLOGY | Facility: HOSPITAL | Age: 78
Discharge: HOME | End: 2023-06-15
Admitting: FAMILY MEDICINE
Payer: MEDICARE

## 2023-06-15 VITALS
TEMPERATURE: 97.5 F | BODY MASS INDEX: 28.66 KG/M2 | SYSTOLIC BLOOD PRESSURE: 142 MMHG | HEART RATE: 68 BPM | WEIGHT: 172 LBS | OXYGEN SATURATION: 96 % | HEIGHT: 65 IN | DIASTOLIC BLOOD PRESSURE: 72 MMHG

## 2023-06-15 DIAGNOSIS — R05.9 COUGH, UNSPECIFIED TYPE: Primary | ICD-10-CM

## 2023-06-15 DIAGNOSIS — R05.8 PRODUCTIVE COUGH: ICD-10-CM

## 2023-06-15 DIAGNOSIS — R68.83 CHILLS: ICD-10-CM

## 2023-06-15 DIAGNOSIS — R09.81 NASAL CONGESTION: ICD-10-CM

## 2023-06-15 DIAGNOSIS — R05.9 COUGH, UNSPECIFIED TYPE: ICD-10-CM

## 2023-06-15 LAB
EXPIRATION DATE: NORMAL
EXPIRATION DATE: NORMAL
FLUAV AG NPH QL: NEGATIVE
FLUBV AG NPH QL: NEGATIVE
INTERNAL CONTROL: NORMAL
INTERNAL CONTROL: NORMAL
Lab: NORMAL
Lab: NORMAL
SARS-COV-2 AG UPPER RESP QL IA.RAPID: NORMAL

## 2023-06-15 PROCEDURE — 71046 X-RAY EXAM CHEST 2 VIEWS: CPT

## 2023-06-15 RX ORDER — METHYLPREDNISOLONE 4 MG/1
TABLET ORAL
Qty: 21 EACH | Refills: 0 | Status: SHIPPED | OUTPATIENT
Start: 2023-06-15 | End: 2023-06-20

## 2023-06-15 RX ORDER — DOXYCYCLINE HYCLATE 100 MG/1
100 CAPSULE ORAL 2 TIMES DAILY
Qty: 14 CAPSULE | Refills: 0 | Status: SHIPPED | OUTPATIENT
Start: 2023-06-15

## 2023-06-15 RX ORDER — ALBUTEROL SULFATE 90 UG/1
2 AEROSOL, METERED RESPIRATORY (INHALATION) EVERY 6 HOURS PRN
Qty: 8 G | Refills: 2 | Status: SHIPPED | OUTPATIENT
Start: 2023-06-15

## 2023-06-15 NOTE — ASSESSMENT & PLAN NOTE
Concern for possible aspiration   Getting xray chest   She is a former smoker, wheezing productive cough on exam.   Advise to start doxycycline, steroids and albuterol inhaler.   Ok to continue mucinex as needed.   Seek medical attention if no improvment

## 2023-06-15 NOTE — PROGRESS NOTES
"    Chief Complaint  Cough (Wheezing. Hx pneumonia. /Two days. Productive brownish phlegm. ), Nasal Congestion (Drainage/), and Chills (/)    Subjective    History of Present Illness {CC  Problem List  Visit  Diagnosis   Encounters  Notes  Medications  Labs  Result Review Imaging  Media :23}     Deja Juárez presents to [facility] for   History of Present Illness     79 yo female present for acute visit. States she is currently having productive cough, nasal congestion, and chills.  No fever.   She states she has some wheezing. No shortness of breath.   She is very tired, states no energy, which is unlike her.    Symptoms begin about a week ago.   States has taken nyquil, tussin with little relief.   She states  she had issues with Reflux, cough worse since that night.        Social History     Socioeconomic History    Marital status:    Tobacco Use    Smoking status: Former     Packs/day: 1.00     Years: 5.00     Pack years: 5.00     Types: Cigarettes     Quit date: 1960     Years since quittin.4    Smokeless tobacco: Never    Tobacco comments:     CAFFEINE USE   Vaping Use    Vaping Use: Never used   Substance and Sexual Activity    Alcohol use: No    Drug use: No      Objective     Vital Signs:   /72 (BP Location: Left arm, Patient Position: Sitting, Cuff Size: Adult)   Pulse 68   Temp 97.5 °F (36.4 °C)   Ht 165.1 cm (65\")   Wt 78 kg (172 lb)   SpO2 96%   BMI 28.62 kg/m²   Physical Exam  Constitutional:       General: She is not in acute distress.     Appearance: She is not ill-appearing.   HENT:      Head: Normocephalic.   Cardiovascular:      Rate and Rhythm: Regular rhythm.      Heart sounds: Normal heart sounds.   Pulmonary:      Breath sounds: Wheezing present.   Musculoskeletal:      Right lower leg: No edema.      Left lower leg: No edema.   Neurological:      Mental Status: She is alert.      Result Review  Data Reviewed:{ Labs  " Result Review  Imaging  Med Tab  Media :23}   The following data was reviewed by: Stephanie Waller MD on 06/15/2023  Lab Results - Last 18 Months   Lab Units 03/21/23  1135 08/23/22  1201 08/06/22  1837 06/23/22  1001 01/21/22  0855   BUN mg/dL 11  --  12  --  15   CREATININE mg/dL 0.73  --  0.71  --  0.72   EGFR IF NONAFRICN AM mL/min/1.73  --   --   --   --  82   EGFR IF AFRICN AM mL/min/1.73  --   --   --   --  94   SODIUM mmol/L 137  --  139  --  137   POTASSIUM mmol/L 4.9  --  3.6  --  4.7   CHLORIDE mmol/L 100  --  98  --  99   CALCIUM mg/dL 9.7  --  9.1  --  9.7   ALBUMIN g/dL 4.4  --  3.90  --  4.5   BILIRUBIN mg/dL 0.7  --  0.3  --  0.8   ALK PHOS IU/L 95  --  139*  --  88   AST (SGOT) IU/L 18  --  17  --  16   ALT (SGPT) IU/L 14  --  15  --  14   CHOLESTEROL mg/dL 218* 215*  --   --  255*   TRIGLYCERIDES mg/dL 201* 174*  --   --  172*   HDL CHOL mg/dL 51 47  --   --  59   VLDL CHOLESTEROL RATNA mg/dL 36 31  --   --  31   LDL CHOL mg/dL 131* 137*  --   --  165*   WBC x10E3/uL 6.9 7.1 12.93*   < >  --    RBC x10E6/uL 4.84 3.94 3.14*   < >  --    HEMATOCRIT % 40.7 34.4 27.2*   < >  --    MCV fL 84 87 86.6   < >  --    MCH pg 28.5 27.7 28.7   < >  --     < > = values in this interval not displayed.              Current Outpatient Medications:     apixaban (ELIQUIS) 5 MG tablet tablet, Take 1 tablet by mouth 2 (Two) Times a Day. (Patient taking differently: Take 1 tablet by mouth Daily.), Disp: 56 tablet, Rfl: 0    calcium citrate (CALCITRATE) 950 (200 Ca) MG tablet, Take 1 tablet by mouth Daily., Disp: , Rfl:     Cholecalciferol (VITAMIN D) 1000 UNITS tablet, Take 1 tablet by mouth., Disp: , Rfl:     famotidine (PEPCID) 20 MG tablet, Take 1 tablet by mouth., Disp: , Rfl:     metoprolol succinate XL (TOPROL-XL) 50 MG 24 hr tablet, Take 1 tablet by mouth Every Night., Disp: 90 tablet, Rfl: 3    rosuvastatin (Crestor) 10 MG tablet, Take 1 tablet by mouth Every Night., Disp: 90 tablet, Rfl: 3    sertraline  (ZOLOFT) 25 MG tablet, TAKE ONE TABLET BY MOUTH DAILY, Disp: 30 tablet, Rfl: 3    albuterol sulfate  (90 Base) MCG/ACT inhaler, Inhale 2 puffs Every 6 (Six) Hours As Needed for Wheezing or Shortness of Air., Disp: 8 g, Rfl: 2    doxycycline (VIBRAMYCIN) 100 MG capsule, Take 1 capsule by mouth 2 (Two) Times a Day., Disp: 14 capsule, Rfl: 0    methylPREDNISolone (MEDROL) 4 MG dose pack, Take as directed on package instructions., Disp: 21 each, Rfl: 0      Assessment and Plan {CC Problem List  Visit Diagnosis  ROS  Review (Popup)  Health Maintenance  Quality  BestPractice  Medications  SmartSets  SnapShot Encounters  Media :23}   Problem List Items Addressed This Visit          Pulmonary and Pneumonias    Productive cough    Current Assessment & Plan     Concern for possible aspiration   Getting xray chest   She is a former smoker, wheezing productive cough on exam.   Advise to start doxycycline, steroids and albuterol inhaler.   Ok to continue mucinex as needed.   Seek medical attention if no improvment          Other Visit Diagnoses       Cough, unspecified type    -  Primary    Relevant Orders    POCT SARS-CoV-2 Antigen MARLENA (Completed)    POCT Influenza A/B (Completed)    XR Chest PA & Lateral    Chills        Relevant Orders    POCT SARS-CoV-2 Antigen MARLENA (Completed)    POCT Influenza A/B (Completed)    Nasal congestion        Relevant Orders    POCT SARS-CoV-2 Antigen MARLENA (Completed)    POCT Influenza A/B (Completed)          Covid and flu negative.       Follow Up   Return if symptoms worsen or fail to improve.  Patient was given instructions and counseling regarding her condition or for health maintenance advice. Please see specific information pulled into the AVS if appropriate.    EpicAct:MR_WS_AMB_ORDERS,RunParams:STARTUPTYPE=FOLLOW    MR_WS_AMB_DISCHARGE

## 2023-08-02 ENCOUNTER — TELEPHONE (OUTPATIENT)
Dept: CARDIOLOGY | Facility: CLINIC | Age: 78
End: 2023-08-02

## 2023-08-02 NOTE — TELEPHONE ENCOUNTER
Caller: Deja Juárez    Relationship to patient: Self    Best call back number: [phone]    Patient is needing: PT NEEDING SAMPLES OF ELIQUIS 5MG. PLS ADVISE WHEN READY FOR .

## 2023-09-15 DIAGNOSIS — F41.9 ANXIETY: ICD-10-CM

## 2023-09-18 RX ORDER — SERTRALINE HYDROCHLORIDE 25 MG/1
TABLET, FILM COATED ORAL
Qty: 30 TABLET | Refills: 3 | Status: SHIPPED | OUTPATIENT
Start: 2023-09-18

## 2023-09-20 ENCOUNTER — TELEPHONE (OUTPATIENT)
Dept: CARDIOLOGY | Facility: CLINIC | Age: 78
End: 2023-09-20
Payer: MEDICARE

## 2023-09-20 NOTE — TELEPHONE ENCOUNTER
Patient called and requested samples of Eliquis 5 mg to be picked up in [place].  Please advise.    CB: [phone]    Thanks,  Juan M

## 2023-11-01 ENCOUNTER — TRANSCRIBE ORDERS (OUTPATIENT)
Dept: ADMINISTRATIVE | Facility: HOSPITAL | Age: 78
End: 2023-11-01
Payer: MEDICARE

## 2023-11-01 DIAGNOSIS — Z12.31 SCREENING MAMMOGRAM FOR BREAST CANCER: Primary | ICD-10-CM

## 2023-11-07 ENCOUNTER — OFFICE VISIT (OUTPATIENT)
Dept: CARDIOLOGY | Facility: CLINIC | Age: 78
End: 2023-11-07
Payer: MEDICARE

## 2023-11-07 VITALS
HEIGHT: 65 IN | DIASTOLIC BLOOD PRESSURE: 76 MMHG | WEIGHT: 172 LBS | BODY MASS INDEX: 28.66 KG/M2 | SYSTOLIC BLOOD PRESSURE: 128 MMHG | HEART RATE: 66 BPM

## 2023-11-07 DIAGNOSIS — I25.84 CORONARY ARTERY CALCIFICATION: ICD-10-CM

## 2023-11-07 DIAGNOSIS — I25.10 CORONARY ARTERY CALCIFICATION: ICD-10-CM

## 2023-11-07 DIAGNOSIS — R06.09 DYSPNEA ON EXERTION: Primary | ICD-10-CM

## 2023-11-07 DIAGNOSIS — I34.0 NONRHEUMATIC MITRAL VALVE REGURGITATION: ICD-10-CM

## 2023-11-07 DIAGNOSIS — E78.5 HYPERLIPIDEMIA, UNSPECIFIED HYPERLIPIDEMIA TYPE: Chronic | ICD-10-CM

## 2023-11-07 DIAGNOSIS — F41.9 ANXIETY: ICD-10-CM

## 2023-11-07 DIAGNOSIS — I49.3 PVC (PREMATURE VENTRICULAR CONTRACTION): ICD-10-CM

## 2023-11-07 DIAGNOSIS — I48.0 PAF (PAROXYSMAL ATRIAL FIBRILLATION): ICD-10-CM

## 2023-11-07 PROCEDURE — 1159F MED LIST DOCD IN RCRD: CPT | Performed by: NURSE PRACTITIONER

## 2023-11-07 PROCEDURE — 1160F RVW MEDS BY RX/DR IN RCRD: CPT | Performed by: NURSE PRACTITIONER

## 2023-11-07 PROCEDURE — 99214 OFFICE O/P EST MOD 30 MIN: CPT | Performed by: NURSE PRACTITIONER

## 2023-11-07 PROCEDURE — 93000 ELECTROCARDIOGRAM COMPLETE: CPT | Performed by: NURSE PRACTITIONER

## 2023-11-07 NOTE — PROGRESS NOTES
[facility] CARDIOLOGY  [address]  [facility] [address]  Phone: [phone]  Fax: [phone]      Patient Name: Deja Juárez  :1945  Age: 78 y.o.  Primary Cardiologist: Lucy Garcia MD  Encounter Provider:  FRIDA Fontenot      Chief Complaint     Chief Complaint: Follow-up      SUBJECTIVE     History of Present Illness:  Deja Juárez is a 78 y.o.  female whose medical history includes anxiety, depression, hyperlipidemia, scoliosis, and osteoarthritis. She is followed in our office by Dr. Garcia for frequent PVC's and PAF.     23 Follow-up:  She is here for 6-month follow-up.  She is here with her granddaughter.  She still has some shortness of breath when going up the stairs but she relates this to some increased weight gain.  She has occasional palpitations but feels that they are her mostly controlled.  She had 1 episode while she was on vacation in which her heart rate was 120 but then it resolved with rest.  She has had no more of these episodes.  Typically her heart rate is in the 60s and her blood pressure is 120/70s.  She is not having any chest pain, orthopnea, or leg swelling.  She is taking her medications as prescribed.    Below is a summary of pertinent cardiology findings:  Former smoker; quit 1980s. Denies drugs or alcohol. Drinks one cup of coffee daily. She is  and has children; has 2 grandchildren. She is retired. Family history includes both parents with hypertension; diabetes in her father.  2/10/2017 Echocardiogram showed EF 69%, grade 2 diastolic dysfunction, and mild mitral insufficiency. Myocardial perfusion stress test was non-ischemic.   2017 Holter monitor showed PVCs, PACs, and one run of PSVT. She was started on metoprolol.   3/4/2019 Echocardiogram showed EF 57%, grade 1 diastolic dysfunction, mild mitral insufficiency, and thickened aortic valve. Holter monitor showed  frequent PVCs and occasional PACs.   2022 CTA chest no evidence of PE, lungs are clear.  2022 echocardiogram showed EF 60%, normal LV diastolic function, normal RVSP, trace tricuspid regurgitation, and mild calcification of the aortic valve without regurgitation or stenosis.  2022 coronary artery calcium score 75 Agatston units units with 59 9 units in the LAD, 8 in the left circumflex, and 8 in the RCA.  Her rosuvastatin was increased to 10 mg daily and she was started on aspirin.  2022 14-day heart monitor showed the predominant rhythm to be sinus rhythm with average heart rate 68 bpm, 1 episode of VT lasting 5 beats with no symptoms, 88 episodes of SVT with the longest lasting 96 beats and possibly atrial fibrillation, and PACs occurring 2.9% of the time.  She was started on apixaban.    Past Medical History:   Diagnosis Date    Anxiety     Depression     GERD (gastroesophageal reflux disease)     Hyperlipidemia     Hypertension     Scoliosis        Past Surgical History:  Left eye surgery 2019  Bilateral knee replacements;  and     Social History     Socioeconomic History    Marital status:    Tobacco Use    Smoking status: Former     Packs/day: 1.00     Years: 5.00     Additional pack years: 0.00     Total pack years: 5.00     Types: Cigarettes     Quit date: 1960     Years since quittin.8    Smokeless tobacco: Never    Tobacco comments:     CAFFEINE USE   Vaping Use    Vaping Use: Never used   Substance and Sexual Activity    Alcohol use: No    Drug use: No         Review of Systems     Review of Systems   Cardiovascular:  Positive for dyspnea on exertion. Negative for chest pain, claudication, cyanosis, irregular heartbeat, leg swelling, near-syncope, orthopnea, palpitations, paroxysmal nocturnal dyspnea and syncope.       Medications     Allergies as of 2023    (No Known Allergies)       Current Outpatient Medications on File Prior to Visit   Medication  "Sig    Cholecalciferol (VITAMIN D) 1000 UNITS tablet Take 1 tablet by mouth.    famotidine (PEPCID) 20 MG tablet Take 1 tablet by mouth.    metoprolol succinate XL (TOPROL-XL) 50 MG 24 hr tablet Take 1 tablet by mouth Every Night.    rosuvastatin (Crestor) 10 MG tablet Take 1 tablet by mouth Every Night.    sertraline (ZOLOFT) 25 MG tablet TAKE ONE TABLET BY MOUTH DAILY (Patient taking differently: 1 tablet. Pt is only taking 1/2 of a tab)    [DISCONTINUED] apixaban (ELIQUIS) 5 MG tablet tablet Take 1 tablet by mouth 2 (Two) Times a Day. (Patient taking differently: Take 1 tablet by mouth Daily. Pt is on ly taking 1 tab a day)    [DISCONTINUED] albuterol sulfate  (90 Base) MCG/ACT inhaler Inhale 2 puffs Every 6 (Six) Hours As Needed for Wheezing or Shortness of Air. (Patient not taking: Reported on 11/7/2023)    [DISCONTINUED] calcium citrate (CALCITRATE) 950 (200 Ca) MG tablet Take 1 tablet by mouth Daily. (Patient not taking: Reported on 11/7/2023)    [DISCONTINUED] doxycycline (VIBRAMYCIN) 100 MG capsule Take 1 capsule by mouth 2 (Two) Times a Day. (Patient not taking: Reported on 11/7/2023)     No current facility-administered medications on file prior to visit.          OBJECTIVE     Vital Signs:   /76   Pulse 66   Ht 165.1 cm (65\")   Wt 78 kg (172 lb)   BMI 28.62 kg/m²       Weight:  Wt Readings from Last 3 Encounters:   11/07/23 78 kg (172 lb)   06/15/23 78 kg (172 lb)   04/25/23 77.6 kg (171 lb)     Body mass index is 28.62 kg/m².      Physical Exam     Physical Exam  Constitutional:       General: She is not in acute distress.  HENT:      Head: Normocephalic and atraumatic.      Mouth/Throat:      Mouth: Mucous membranes are moist.   Eyes:      General: No scleral icterus.     Extraocular Movements: Extraocular movements intact.      Conjunctiva/sclera: Conjunctivae normal.      Pupils: Pupils are equal, round, and reactive to light.   Cardiovascular:      Rate and Rhythm: Regular rhythm. " Bradycardia present.      Pulses: Normal pulses.      Heart sounds: S1 normal and S2 normal.   Pulmonary:      Effort: No respiratory distress.      Breath sounds: Normal breath sounds. No wheezing, rhonchi or rales.   Abdominal:      General: Bowel sounds are normal. There is no distension.      Palpations: Abdomen is soft.      Tenderness: There is no abdominal tenderness.   Musculoskeletal:         General: Normal range of motion.      Cervical back: Normal range of motion and neck supple.      Right lower leg: No edema.      Left lower leg: No edema.   Skin:     General: Skin is warm and dry.      Coloration: Skin is not jaundiced.   Neurological:      Mental Status: She is alert and oriented to person, place, and time.   Psychiatric:         Mood and Affect: Mood normal.         Reviewed Data     Result Review :  The following data was reviewed by FRIDA Fontenot on 11/07/23:  Labs 08/23/2022:  Chol 215, HDL 47, , Trig 174, Hgb 10.9, Plt 49  Labs 08/06/2022:  cr 0.7, K 3.6, otherwise unremarkable CMP, Mg 2.1, Hgb 9.0, Plt 532, troponin <0.010, proBNP 1032   Labs 01/21/2022:  Chol 255, HDL 59, , Trig 172   03/21/2023:  cr 0.7, K 4.9, otherwise unremarkable CMP, Chol 218, HDL 51, , Trig 207, Hgb 13.8,       ECG 12 Lead    Date/Time: 11/7/2023 11:44 AM  Performed by: Coni Jones APRN    Authorized by: Coni Jones APRN  Comparison: compared with previous ECG from 4/25/2023  Similar to previous ECG  Rhythm: sinus rhythm and sinus arrhythmia  Rate: normal  BPM: 66  T inversion: I, V1, V2 and V3  T flattening: V4, V5 and V6    Clinical impression: abnormal EKG          Assessment and Plan        Assessment and Plan     Assessment:  1. Dyspnea on exertion    2. PAF (paroxysmal atrial fibrillation)    3. PVC (premature ventricular contraction)    4. Nonrheumatic mitral valve regurgitation    5. Coronary artery calcification    6. Hyperlipidemia,  unspecified hyperlipidemia type    7. Anxiety             Dyspnea with exertion: There is been no improvement with control of her heart rate  Paroxysmal Afib:  Runs of Afib could not be ruled out on September 2022 heart monitor. She is anticoagulated with apixaban.  She is maintaining sinus rhythm on 50 mg metoprolol succinate nightly.  We discussed that she needs to take her apixaban 5 mg twice daily.  PACs: PAC burden was 2.9% on September 2022 heart monitor.  No recent palpitations.  PVCs: Holter monitor from March 2019 showed frequent PVCs; 1 episode of VT on September 2022 heart monitor.  No recent palpitations.  Mitral valve insufficiency: This was graded as mild on echocardiogram from March 2019. Stable on August 2022 echocardiogram.  She is compensated by exam today.  Coronary artery calcification:  August 2022 coronary artery calcium score 75 Agatston units units with 59 9 units in the LAD, 8 in the left circumflex, and 8 in the RCA.  Her rosuvastatin was increased to 10 mg daily and she was started on aspirin; aspirin stopped when she was started on apixaban..  She is having some dyspnea with exertion but no chest pain  Hyperlipidemia: Lipids still not at goal when checked in March 2023..  Rosuvastatin increased due to abnormal coronary artery calcium score.  Anxiety: She had not been taking her anxiety medications postoperatively even wonder if this contributes to her episodes of her heart racing.  She denies any increased anxiety. She feels better on metoprolol.  No recent issues with anxiety.  Osteoarthritis: She has had bilateral knee replacements and recently had right hip replacement.  She is not as active      Plan:  Ms. Juárez is a patient of Dr. Garcia's with paroxysmal atrial fibrillation per Holter monitor and PVCs.  She also has some coronary artery calcification.  She continues to have some dyspnea with exertion; I will check treadmill myocardial perfusion stress study to evaluate for  ischemia and possible arrhythmia.  Barring any surprises, I will have her keep a 6-month appointment with Dr. Garcia.  I will also check her labs at her earliest convenience.    Follow Up:  Return in about 6 months (around 5/7/2024) for Follow-up with Dr. Garcia.  Orders Placed This Encounter   Procedures    Iron Profile    Magnesium    Comprehensive Metabolic Panel    proBNP    Uric Acid    TSH    Stress Test With Myocardial Perfusion One Day    ECG 12 Lead    CBC & Differential      New Medications Ordered This Visit   Medications    apixaban (ELIQUIS) 5 MG tablet tablet     Sig: Take 1 tablet by mouth 2 (Two) Times a Day.     Order Specific Question:   Lot Number?     Answer:   [id number]     Order Specific Question:   Expiration Date?     Answer:   3/1/2025     Order Specific Question:   Quantity     Answer:   28         Thank you the opportunity to participate in this patient's care.    FRIDA Bryant    This office note has been dictated.

## 2023-11-09 ENCOUNTER — TELEPHONE (OUTPATIENT)
Dept: CARDIOLOGY | Facility: CLINIC | Age: 78
End: 2023-11-09
Payer: MEDICARE

## 2023-11-09 NOTE — TELEPHONE ENCOUNTER
Caller Name: Deja Juárez      Relationship: Self      Best Contact Number: [phone]      Patient is requesting samples of: ELIQUIS 5MG      How many days of medication do you have left? FEW DAYS         Additional Information:

## 2023-11-10 ENCOUNTER — TELEPHONE (OUTPATIENT)
Dept: CARDIOLOGY | Facility: CLINIC | Age: 78
End: 2023-11-10
Payer: MEDICARE

## 2023-11-10 NOTE — TELEPHONE ENCOUNTER
Caller: VÍCTOR    Relationship: SELF    Best call back number: [phone]    What is the best time to reach you: ANY      What was the call regarding: PT CALLED IN ABOUT HER LABS-  SHE SAID SHE SHOWED UP AT [facility] TO GET THEM DONE BUT WAS TOLD THEY COULD NOT BE DRAWN THERE.     SHE IS GOING TO TRY AND GO TO [facility] BUT WOULD ALSO WOULD LIKE THEM FAXED IF POSSIBLE TO [facility]:       [address], [place] [address]   PHONE: [phone]  Fax: [phone]

## 2023-11-16 ENCOUNTER — TELEPHONE (OUTPATIENT)
Dept: CARDIOLOGY | Facility: CLINIC | Age: 78
End: 2023-11-16
Payer: MEDICARE

## 2023-11-29 ENCOUNTER — HOSPITAL ENCOUNTER (OUTPATIENT)
Dept: CARDIOLOGY | Facility: HOSPITAL | Age: 78
Discharge: HOME | End: 2023-11-29
Admitting: NURSE PRACTITIONER
Payer: MEDICARE

## 2023-11-29 ENCOUNTER — TELEPHONE (OUTPATIENT)
Dept: CARDIOLOGY | Facility: CLINIC | Age: 78
End: 2023-11-29
Payer: MEDICARE

## 2023-11-29 VITALS — BODY MASS INDEX: 28.65 KG/M2 | WEIGHT: 171.96 LBS | HEIGHT: 65 IN

## 2023-11-29 DIAGNOSIS — R06.09 DYSPNEA ON EXERTION: ICD-10-CM

## 2023-11-29 DIAGNOSIS — I48.0 PAF (PAROXYSMAL ATRIAL FIBRILLATION): ICD-10-CM

## 2023-11-29 LAB
BH CV NUCLEAR PRIOR STUDY: 1
BH CV REST NUCLEAR ISOTOPE DOSE: 10.5 MCI
BH CV STRESS BP STAGE 1: NORMAL
BH CV STRESS COMMENTS STAGE 1: NORMAL
BH CV STRESS DOSE REGADENOSON STAGE 1: 0.4
BH CV STRESS DURATION MIN STAGE 1: 0
BH CV STRESS DURATION SEC STAGE 1: 10
BH CV STRESS HR STAGE 1: 117
BH CV STRESS NUCLEAR ISOTOPE DOSE: 35.6 MCI
BH CV STRESS PROTOCOL 1: NORMAL
BH CV STRESS RECOVERY BP: NORMAL MMHG
BH CV STRESS RECOVERY HR: 94 BPM
BH CV STRESS STAGE 1: 1
LV EF NUC BP: 70 %
MAXIMAL PREDICTED HEART RATE: 143 BPM
PERCENT MAX PREDICTED HR: 81.82 %
STRESS BASELINE BP: NORMAL MMHG
STRESS BASELINE HR: 89 BPM
STRESS PERCENT HR: 96 %
STRESS POST EXERCISE DUR SEC: 10 SEC
STRESS POST PEAK BP: NORMAL MMHG
STRESS POST PEAK HR: 117 BPM
STRESS TARGET HR: 122 BPM

## 2023-11-29 PROCEDURE — 0 TECHNETIUM TETROFOSMIN KIT: Performed by: NURSE PRACTITIONER

## 2023-11-29 PROCEDURE — 78452 HT MUSCLE IMAGE SPECT MULT: CPT

## 2023-11-29 PROCEDURE — 25010000002 REGADENOSON 0.4 MG/5ML SOLUTION: Performed by: NURSE PRACTITIONER

## 2023-11-29 PROCEDURE — 93017 CV STRESS TEST TRACING ONLY: CPT

## 2023-11-29 PROCEDURE — A9502 TC99M TETROFOSMIN: HCPCS | Performed by: NURSE PRACTITIONER

## 2023-11-29 RX ORDER — REGADENOSON 0.08 MG/ML
0.4 INJECTION, SOLUTION INTRAVENOUS
Status: COMPLETED | OUTPATIENT
Start: 2023-11-29 | End: 2023-11-29

## 2023-11-29 RX ADMIN — TETROFOSMIN 1 DOSE: 1.38 INJECTION, POWDER, LYOPHILIZED, FOR SOLUTION INTRAVENOUS at 11:54

## 2023-11-29 RX ADMIN — TETROFOSMIN 1 DOSE: 1.38 INJECTION, POWDER, LYOPHILIZED, FOR SOLUTION INTRAVENOUS at 13:00

## 2023-11-29 RX ADMIN — REGADENOSON 0.4 MG: 0.08 INJECTION, SOLUTION INTRAVENOUS at 13:00

## 2023-11-30 NOTE — TELEPHONE ENCOUNTER
Her stress test was normal. How is her breathing? Is she having more frequent palpitations. No PVCs were noted on stress test.     Thanks!  FRIDA Gill

## 2023-11-30 NOTE — TELEPHONE ENCOUNTER
Notified pt of Natalia's message.    Thank you,    Aga COLE RN  Triage [facility]  11/30/23 13:12 EST

## 2023-11-30 NOTE — TELEPHONE ENCOUNTER
I spoke with Deja Juárez and updated pt on results from provider.  Pt verbalized understanding.    Her breathing is doing okay.  She is not having any palpitations.    Pt shares that she had blood work drawn on 11/17/23.  The results were uploaded into her chart under the media tab.  She's wanting to know how the lab work looked.  Was there a message I could give her?    Thank you,    Aga COLE, RN  Triage [facility]  11/30/23 09:15 EST

## 2024-01-05 DIAGNOSIS — R00.2 PALPITATIONS: ICD-10-CM

## 2024-01-05 DIAGNOSIS — I49.3 PVC (PREMATURE VENTRICULAR CONTRACTION): ICD-10-CM

## 2024-01-05 RX ORDER — METOPROLOL SUCCINATE 50 MG/1
50 TABLET, EXTENDED RELEASE ORAL NIGHTLY
Qty: 90 TABLET | Refills: 1 | Status: SHIPPED | OUTPATIENT
Start: 2024-01-05

## 2024-01-05 NOTE — TELEPHONE ENCOUNTER
[id number]  LOV-11/07/23-MM    Plan:  Ms. Juárez is a patient of Dr. Garcia's with paroxysmal atrial fibrillation per Holter monitor and PVCs.  She also has some coronary artery calcification.  She continues to have some dyspnea with exertion; I will check treadmill myocardial perfusion stress study to evaluate for ischemia and possible arrhythmia.  Barring any surprises, I will have her keep a 6-month appointment with Dr. Garcia.  I will also check her labs at her earliest convenience.     Follow Up:  Return in about 6 months (around 5/7/2024) for Follow-up with Dr. Garcia.

## 2024-01-05 NOTE — TELEPHONE ENCOUNTER
Caller: JuárezDeja    Relationship: Self    Best call back number: [phone] (home)     Requested Prescriptions:   Requested Prescriptions     Pending Prescriptions Disp Refills    metoprolol succinate XL (TOPROL-XL) 50 MG 24 hr tablet 90 tablet 3     Sig: Take 1 tablet by mouth Every Night.        Pharmacy where request should be sent: [facility] [id number] [address] [facility] [address] - [phone] [facility] [phone] FX     Last office visit with prescribing clinician: 4/25/2023   Last telemedicine visit with prescribing clinician: Visit date not found   Next office visit with prescribing clinician: 5/7/2024     Additional details provided by patient: PHARMACY STATES THEY ONLY HAVE PRESCRIPTION FOR 100MG OF METOPROLOL, BUT SHE IS ONLY TAKING 50MG, SO SHE IS NEEDING A NEW PRESCRIPTION SENT OVER FOR THE RIGHT DOSAGE PLEASE, AND IF IT COULD BE FOR A 90 DAY SUPPLY THAT WOULD BE GREAT, PATIENT HAS ABOUT 4 DAYS LEFT    Does the patient have less than a 3 day supply:  [] Yes  [x] No    Would you like a call back once the refill request has been completed: [] Yes [x] No    If the office needs to give you a call back, can they leave a voicemail: [] Yes [x] No    Mele Garcia Rep   01/05/24 11:06 EST

## 2024-02-06 ENCOUNTER — OFFICE VISIT (OUTPATIENT)
Dept: FAMILY MEDICINE CLINIC | Facility: CLINIC | Age: 79
End: 2024-02-06
Payer: MEDICARE

## 2024-02-06 VITALS
TEMPERATURE: 97.5 F | SYSTOLIC BLOOD PRESSURE: 118 MMHG | HEIGHT: 65 IN | BODY MASS INDEX: 28.49 KG/M2 | OXYGEN SATURATION: 96 % | HEART RATE: 58 BPM | WEIGHT: 171 LBS | DIASTOLIC BLOOD PRESSURE: 68 MMHG

## 2024-02-06 DIAGNOSIS — Z23 IMMUNIZATION DUE: ICD-10-CM

## 2024-02-06 DIAGNOSIS — K21.9 GASTROESOPHAGEAL REFLUX DISEASE, UNSPECIFIED WHETHER ESOPHAGITIS PRESENT: Chronic | ICD-10-CM

## 2024-02-06 DIAGNOSIS — I48.0 PAF (PAROXYSMAL ATRIAL FIBRILLATION): Chronic | ICD-10-CM

## 2024-02-06 DIAGNOSIS — I34.0 NONRHEUMATIC MITRAL VALVE REGURGITATION: Chronic | ICD-10-CM

## 2024-02-06 DIAGNOSIS — F41.9 ANXIETY DISORDER, UNSPECIFIED TYPE: ICD-10-CM

## 2024-02-06 DIAGNOSIS — Z78.0 POST-MENOPAUSAL: ICD-10-CM

## 2024-02-06 DIAGNOSIS — E78.5 HYPERLIPIDEMIA, UNSPECIFIED HYPERLIPIDEMIA TYPE: Primary | Chronic | ICD-10-CM

## 2024-02-06 DIAGNOSIS — R53.83 OTHER FATIGUE: ICD-10-CM

## 2024-02-06 DIAGNOSIS — M17.12 PRIMARY LOCALIZED OSTEOARTHRITIS OF LEFT KNEE: Chronic | ICD-10-CM

## 2024-02-06 PROBLEM — R05.8 PRODUCTIVE COUGH: Status: RESOLVED | Noted: 2023-06-15 | Resolved: 2024-02-06

## 2024-02-06 RX ORDER — ROSUVASTATIN CALCIUM 10 MG/1
10 TABLET, COATED ORAL NIGHTLY
Qty: 90 TABLET | Refills: 3 | Status: SHIPPED | OUTPATIENT
Start: 2024-02-06

## 2024-02-06 RX ORDER — METOPROLOL SUCCINATE 50 MG/1
50 TABLET, EXTENDED RELEASE ORAL NIGHTLY
Qty: 90 TABLET | Refills: 1 | Status: SHIPPED | OUTPATIENT
Start: 2024-02-06

## 2024-02-06 RX ORDER — HYDROXYZINE HYDROCHLORIDE 25 MG/1
25 TABLET, FILM COATED ORAL NIGHTLY PRN
Qty: 30 TABLET | Refills: 2 | Status: SHIPPED | OUTPATIENT
Start: 2024-02-06

## 2024-02-06 RX ORDER — FAMOTIDINE 20 MG/1
20 TABLET, FILM COATED ORAL 2 TIMES DAILY PRN
Qty: 60 TABLET | Refills: 3 | Status: SHIPPED | OUTPATIENT
Start: 2024-02-06

## 2024-02-06 RX ORDER — MELATONIN
1000 DAILY
Qty: 30 TABLET | Refills: 5 | Status: SHIPPED | OUTPATIENT
Start: 2024-02-06

## 2024-02-06 NOTE — PROGRESS NOTES
The ABCs of the Annual Wellness Visit  Subsequent Medicare Wellness Visit    Subjective    Deja Juárez is a 79 y.o. female who presents for a Subsequent Medicare Wellness Visit.    The following portions of the patient's history were reviewed and   updated as appropriate: allergies, current medications, past family history, past medical history, past social history, past surgical history, and problem list.    Compared to one year ago, the patient feels her physical   health is better.    Compared to one year ago, the patient feels her mental   health is the same.    Recent Hospitalizations:  She was not admitted to the hospital during the last year.       Current Medical Providers:  Patient Care Team:  Kosta Ritter APRN as PCP - General (Nurse Practitioner)  Asmita Fox APRN as Nurse Practitioner (Internal Medicine)    Outpatient Medications Prior to Visit   Medication Sig Dispense Refill    Cholecalciferol (VITAMIN D) 1000 UNITS tablet Take 1 tablet by mouth.      famotidine (PEPCID) 20 MG tablet Take 1 tablet by mouth.      metoprolol succinate XL (TOPROL-XL) 50 MG 24 hr tablet Take 1 tablet by mouth Every Night. 90 tablet 1    rosuvastatin (Crestor) 10 MG tablet Take 1 tablet by mouth Every Night. 90 tablet 3    sertraline (ZOLOFT) 25 MG tablet TAKE ONE TABLET BY MOUTH DAILY (Patient taking differently: 1 tablet. Pt is only taking 1/2 of a tab) 30 tablet 3    apixaban (ELIQUIS) 5 MG tablet tablet Take 1 tablet by mouth 2 (Two) Times a Day. (Patient not taking: Reported on 2/6/2024)       No facility-administered medications prior to visit.       No opioid medication identified on active medication list. I have reviewed chart for other potential  high risk medication/s and harmful drug interactions in the elderly.        Aspirin is not on active medication list.  Aspirin use is indicated based on review of current medical condition/s. Pros and cons of this therapy have been discussed with  "this patient. Benefits of this medication outweigh potential harm.  Patient has been instructed to start taking this medication..    Patient Active Problem List   Diagnosis    Hyperlipidemia    Primary localized osteoarthritis of left knee    Preop cardiovascular exam    Abnormal ECG    Knee pain    GERD (gastroesophageal reflux disease)    Primary localized osteoarthritis of right knee    Palpitations    COVID-19 virus infection    Anxiety    PVC (premature ventricular contraction)    PAF (paroxysmal atrial fibrillation)    Nonrheumatic mitral valve regurgitation    Coronary artery calcification    Immunization due     Advance Care Planning   Advance Care Planning     Advance Directive is not on file.  ACP discussion was held with the patient during this visit. Patient has an advance directive (not in EMR), copy requested.     Objective    Vitals:    24 1000   BP: 118/68   Pulse: 58   Temp: 97.5 °F (36.4 °C)   SpO2: 96%   Weight: 77.6 kg (171 lb)   Height: 165.1 cm (65\")     Estimated body mass index is 28.46 kg/m² as calculated from the following:    Height as of this encounter: 165.1 cm (65\").    Weight as of this encounter: 77.6 kg (171 lb).           Does the patient have evidence of cognitive impairment? No          HEALTH RISK ASSESSMENT    Smoking Status:  Social History     Tobacco Use   Smoking Status Former    Packs/day: 1.00    Years: 5.00    Additional pack years: 0.00    Total pack years: 5.00    Types: Cigarettes    Quit date: 1960    Years since quittin.1   Smokeless Tobacco Never   Tobacco Comments    CAFFEINE USE     Alcohol Consumption:  Social History     Substance and Sexual Activity   Alcohol Use No     Fall Risk Screen:    HEIKE Fall Risk Assessment was completed, and patient is at LOW risk for falls.Assessment completed on:2024    Depression Screenin/6/2024    10:00 AM   PHQ-2/PHQ-9 Depression Screening   Little Interest or Pleasure in Doing Things 0-->not at all "   Feeling Down, Depressed or Hopeless 0-->not at all   PHQ-9: Brief Depression Severity Measure Score 0       Health Habits and Functional and Cognitive Screenin/6/2024    10:00 AM   Functional & Cognitive Status   Do you have difficulty preparing food and eating? No   Do you have difficulty bathing yourself, getting dressed or grooming yourself? No   Do you have difficulty using the toilet? No   Do you have difficulty moving around from place to place? No   Do you have trouble with steps or getting out of a bed or a chair? No   Current Diet Well Balanced Diet   Dental Exam Up to date   Eye Exam Up to date   Exercise (times per week) 0 times per week   Current Exercises Include No Regular Exercise   Do you need help using the phone?  No   Are you deaf or do you have serious difficulty hearing?  No   Do you need help to go to places out of walking distance? No   Do you need help shopping? No   Do you need help preparing meals?  No   Do you need help with housework?  No   Do you need help with laundry? No   Do you need help taking your medications? No   Do you need help managing money? No   Do you ever drive or ride in a car without wearing a seat belt? No   Have you felt unusual stress, anger or loneliness in the last month? No   Who do you live with? Spouse   If you need help, do you have trouble finding someone available to you? No   Have you been bothered in the last four weeks by sexual problems? No   Do you have difficulty concentrating, remembering or making decisions? No       Age-appropriate Screening Schedule:  Refer to the list below for future screening recommendations based on patient's age, sex and/or medical conditions. Orders for these recommended tests are listed in the plan section. The patient has been provided with a written plan.    Health Maintenance   Topic Date Due    RSV Vaccine - Adults >60 Years or Pregnant 32-36 Weeks (1 - 1-dose 60+ series) Never done    DXA SCAN  2024  "(Originally 12/1/2022)    ZOSTER VACCINE (1 of 2) 02/06/2024 (Originally [birth year]/1996)    COVID-19 Vaccine (1) 02/08/2024 (Originally 1945)    INFLUENZA VACCINE  03/31/2024 (Originally 8/1/2023)    LIPID PANEL  03/21/2024    ANNUAL WELLNESS VISIT  02/06/2025    BMI FOLLOWUP  02/06/2025    TDAP/TD VACCINES (3 - Td or Tdap) 04/22/2030    HEPATITIS C SCREENING  Completed    Pneumococcal Vaccine 65+  Completed    COLORECTAL CANCER SCREENING  Discontinued                  CMS Preventative Services Quick Reference  Risk Factors Identified During Encounter  Immunizations Discussed/Encouraged: Influenza and RSV (Respiratory Syncytial Virus)  Dental Screening Recommended  Vision Screening Recommended  The above risks/problems have been discussed with the patient.  Pertinent information has been shared with the patient in the After Visit Summary.  An After Visit Summary and PPPS were made available to the patient.    Follow Up:   Next Medicare Wellness visit to be scheduled in 1 year.       Additional E&M Note during same encounter follows:  Patient has multiple medical problems which are significant and separately identifiable that require additional work above and beyond the Medicare Wellness Visit.      Chief Complaint  off  board [facility] and Medicare Wellness-subsequent    Subjective        HPI  Deja Juárez is also being seen today for HLD, AFIB, Anx/depression.         Objective   Vital Signs:  /68   Pulse 58   Temp 97.5 °F (36.4 °C)   Ht 165.1 cm (65\")   Wt 77.6 kg (171 lb)   SpO2 96%   BMI 28.46 kg/m²     Physical Exam     The following data was reviewed by: FRIDA Boyd on 02/06/2024:  CBC          3/21/2023    11:35   CBC   WBC 6.9    RBC 4.84    Hemoglobin 13.8    Hematocrit 40.7    MCV 84    MCH 28.5    MCHC 33.9    RDW 12.5    Platelets 351      CBC w/diff          3/21/2023    11:35   CBC w/Diff   WBC 6.9    RBC 4.84    Hemoglobin 13.8    Hematocrit 40.7    MCV 84    MCH 28.5  "   MCHC 33.9    RDW 12.5    Platelets 351    Neutrophil Rel % 55    Lymphocyte Rel % 32    Monocyte Rel % 9    Eosinophil Rel % 3    Basophil Rel % 1      Lipid Panel          3/21/2023    11:35   Lipid Panel   Total Cholesterol 218    Triglycerides 201    HDL Cholesterol 51    VLDL Cholesterol 36    LDL Cholesterol  131                       Assessment and Plan   Diagnoses and all orders for this visit:    1. Hyperlipidemia, unspecified hyperlipidemia type (Primary)    2. PAF (paroxysmal atrial fibrillation)    3. Immunization due    4. Primary localized osteoarthritis of left knee    5. Gastroesophageal reflux disease, unspecified whether esophagitis present    6. Nonrheumatic mitral valve regurgitation             Follow Up   No follow-ups on file.  Patient was given instructions and counseling regarding her condition or for health maintenance advice. Please see specific information pulled into the AVS if appropriate.

## 2024-02-06 NOTE — ASSESSMENT & PLAN NOTE
Lipid abnormalities are improving with treatment.  Nutritional counseling was provided. and Pharmacotherapy as ordered.  Lipids will be reassessed in 6 months.  Will recheck fasting lipid panel today.  Discussed the importance of healthy diet, nutrition, and lifestyle. Recommend low salt, fat/cholesterol diet and avoid concentrated sweets. Encouraged DASH diet along with fresh fruits & vegetables and low fat dairy products. Counseled patient to exercise/walk as tolerated. Avoid tobacco and alcohol use.

## 2024-02-06 NOTE — PROGRESS NOTES
Chief Complaint  off  board [facility] and Medicare Wellness-subsequent    Subjective        Deja Juárez presents to [facility]  History of Present Illness  79-year-old white female with a history of hyperlipidemia, paroxysmal A-fib, GERD here for chronic disease management visit and annual wellness visit.  Patient has been following cardiology and saw Dr. Morelos earlier in 2023.  Patient's last visit was with Nurse Practitioner Robert in cardiology in November 2023 according to the note patient is supposed to be on Eliquis for paroxysmal A-fib.  However patient states Eliquis makes her tired and she has stopped taking it and is just taking aspirin once a day.    Counseled patient for 10 minutes regarding the risk of stroke that could result from an cardiac embolus traveling to her brain while not adequately anticoagulated.  Informed patient it was in her best interest to continue Eliquis until she follows up with cardiology.  Patient states Eliquis makes her tired.  Inform patient alternative to Eliquis and Xarelto and to discuss with cardiology regarding Xarelto for A-fib, patient voiced understanding.    Discussed RSV vaccine however patient refused.  Counseled patient to take annual flu shot in a timely fashion.  Patient states she has an appointment for mammogram.  Inform patient we will order her DEXA scan as it is due at this time.  Patient states she is ready for fasting labs today.  Patient also states she does not like sertraline for her anxiety symptoms and desires an alternative.      Discussed hydroxyzine as needed and also educated patient regarding the potential for somnolence immediately after the medication to avoid driving or operating heavy machinery while on hydroxyzine patient voiced understanding.    Patient states she is physically active and even golfs at this time.      Objective   Vital Signs:  /68   Pulse 58   Temp 97.5 °F (36.4 °C)   Ht 165.1  "cm (65\")   Wt 77.6 kg (171 lb)   SpO2 96%   BMI 28.46 kg/m²   Estimated body mass index is 28.46 kg/m² as calculated from the following:    Height as of this encounter: 165.1 cm (65\").    Weight as of this encounter: 77.6 kg (171 lb).       BMI is >= 25 and <30. (Overweight) The following options were offered after discussion;: exercise counseling/recommendations and nutrition counseling/recommendations      Physical Exam  Constitutional:       Appearance: Normal appearance.   HENT:      Head: Normocephalic and atraumatic.   Eyes:      Conjunctiva/sclera: Conjunctivae normal.   Cardiovascular:      Rate and Rhythm: Normal rate and regular rhythm.      Heart sounds: Normal heart sounds.   Pulmonary:      Effort: Pulmonary effort is normal.      Breath sounds: Normal breath sounds.   Abdominal:      General: Bowel sounds are normal.      Palpations: Abdomen is soft.      Comments: Non-tender   Skin:     General: Skin is warm.   Neurological:      General: No focal deficit present.      Mental Status: She is alert and oriented to person, place, and time.   Psychiatric:         Mood and Affect: Mood normal.         Behavior: Behavior normal.        Result Review :    The following data was reviewed by: FRIDA Boyd on 02/06/2024:  CMP          3/21/2023    11:35   CMP   Glucose 94    BUN 11    Creatinine 0.73    Sodium 137    Potassium 4.9    Chloride 100    Calcium 9.7    Total Protein 6.9    Albumin 4.4    Globulin 2.5    Total Bilirubin 0.7    Alkaline Phosphatase 95    AST (SGOT) 18    ALT (SGPT) 14    BUN/Creatinine Ratio 15      CBC          3/21/2023    11:35   CBC   WBC 6.9    RBC 4.84    Hemoglobin 13.8    Hematocrit 40.7    MCV 84    MCH 28.5    MCHC 33.9    RDW 12.5    Platelets 351      Lipid Panel          3/21/2023    11:35   Lipid Panel   Total Cholesterol 218    Triglycerides 201    HDL Cholesterol 51    VLDL Cholesterol 36    LDL Cholesterol  131            Data reviewed : Cardiology " studies reviewed cardiology consult note from November 2023 with patient's nurse practitioner Robert and according to the plan patient continues to be on Eliquis for A-fib.  and Consultant notes reviewed negative treadmill stress test from 2023.             Assessment and Plan     Diagnoses and all orders for this visit:    1. Hyperlipidemia, unspecified hyperlipidemia type (Primary)  Assessment & Plan:  Lipid abnormalities are improving with treatment.  Nutritional counseling was provided. and Pharmacotherapy as ordered.  Lipids will be reassessed in 6 months.  Will recheck fasting lipid panel today.  Discussed the importance of healthy diet, nutrition, and lifestyle. Recommend low salt, fat/cholesterol diet and avoid concentrated sweets. Encouraged DASH diet along with fresh fruits & vegetables and low fat dairy products. Counseled patient to exercise/walk as tolerated. Avoid tobacco and alcohol use.      Orders:  -     rosuvastatin (Crestor) 10 MG tablet; Take 1 tablet by mouth Every Night.  Dispense: 90 tablet; Refill: 3  -     Comprehensive Metabolic Panel  -     Lipid Panel    2. PAF (paroxysmal atrial fibrillation)  Assessment & Plan:  Patient follows cardiology.  Instructed patient to continue Eliquis for the risk of stroke from paroxysmal A-fib if she stops Eliquis without consulting cardiology.  Patient to discuss Xarelto also on follow-up cardiology visit the next couple months.    Orders:  -     metoprolol succinate XL (TOPROL-XL) 50 MG 24 hr tablet; Take 1 tablet by mouth Every Night.  Dispense: 90 tablet; Refill: 1  -     TSH  -     T4, free  -     Comprehensive Metabolic Panel    3. Immunization due  Assessment & Plan:  Patient refused RSV vaccine      4. Primary localized osteoarthritis of left knee  Assessment & Plan:  Stable      5. Gastroesophageal reflux disease, unspecified whether esophagitis present  Assessment & Plan:  GERD precautions: Avoid fatty, greasy, spicy food, if current tobacco  use stop smoking, stay upright for one hour before lying down. Also avoid Tobacco, caffeine, soda, mint, and garlic.      Orders:  -     famotidine (PEPCID) 20 MG tablet; Take 1 tablet by mouth 2 (Two) Times a Day As Needed for Heartburn.  Dispense: 60 tablet; Refill: 3    6. Nonrheumatic mitral valve regurgitation  Assessment & Plan:  Patient follows cardiology      7. Anxiety disorder, unspecified type  Assessment & Plan:  Stop sertraline.  Start hydroxyzine as needed for anxiety symptoms.  No SI/HI.    Orders:  -     hydrOXYzine (ATARAX) 25 MG tablet; Take 1 tablet by mouth At Night As Needed for Anxiety.  Dispense: 30 tablet; Refill: 2    8. Post-menopausal  -     DEXA Bone Density Axial; Future    9. Other fatigue  -     TSH  -     T4, free  -     Vitamin B12  -     CBC & Differential    Other orders  -     cholecalciferol (Vitamin D) 25 MCG (1000 UT) tablet; Take 1 tablet by mouth Daily.  Dispense: 30 tablet; Refill: 5             Follow Up     Return in about 6 months (around 8/6/2024) for Next scheduled follow up.  Patient was given instructions and counseling regarding her condition or for health maintenance advice. Please see specific information pulled into the AVS if appropriate.

## 2024-02-06 NOTE — ASSESSMENT & PLAN NOTE
Patient follows cardiology.  Instructed patient to continue Eliquis for the risk of stroke from paroxysmal A-fib if she stops Eliquis without consulting cardiology.  Patient to discuss Xarelto also on follow-up cardiology visit the next couple months.

## 2024-02-07 LAB
ALBUMIN SERPL-MCNC: 4.7 G/DL (ref 3.8–4.8)
ALBUMIN/GLOB SERPL: 2 {RATIO} (ref 1.2–2.2)
ALP SERPL-CCNC: 91 IU/L (ref 44–121)
ALT SERPL-CCNC: 15 IU/L (ref 0–32)
AST SERPL-CCNC: 22 IU/L (ref 0–40)
BASOPHILS # BLD AUTO: 0.1 X10E3/UL (ref 0–0.2)
BASOPHILS NFR BLD AUTO: 1 %
BILIRUB SERPL-MCNC: 0.9 MG/DL (ref 0–1.2)
BUN SERPL-MCNC: 10 MG/DL (ref 8–27)
BUN/CREAT SERPL: 13 (ref 12–28)
CALCIUM SERPL-MCNC: 9.8 MG/DL (ref 8.7–10.3)
CHLORIDE SERPL-SCNC: 96 MMOL/L (ref 96–106)
CHOLEST SERPL-MCNC: 183 MG/DL (ref 100–199)
CO2 SERPL-SCNC: 23 MMOL/L (ref 20–29)
CREAT SERPL-MCNC: 0.75 MG/DL (ref 0.57–1)
EGFRCR SERPLBLD CKD-EPI 2021: 81 ML/MIN/1.73
EOSINOPHIL # BLD AUTO: 0.2 X10E3/UL (ref 0–0.4)
EOSINOPHIL NFR BLD AUTO: 3 %
ERYTHROCYTE [DISTWIDTH] IN BLOOD BY AUTOMATED COUNT: 12.3 % (ref 11.7–15.4)
GLOBULIN SER CALC-MCNC: 2.4 G/DL (ref 1.5–4.5)
GLUCOSE SERPL-MCNC: 93 MG/DL (ref 70–99)
HCT VFR BLD AUTO: 43.7 % (ref 34–46.6)
HDLC SERPL-MCNC: 60 MG/DL
HGB BLD-MCNC: 14.2 G/DL (ref 11.1–15.9)
IMM GRANULOCYTES # BLD AUTO: 0 X10E3/UL (ref 0–0.1)
IMM GRANULOCYTES NFR BLD AUTO: 0 %
LDLC SERPL CALC-MCNC: 97 MG/DL (ref 0–99)
LYMPHOCYTES # BLD AUTO: 2.3 X10E3/UL (ref 0.7–3.1)
LYMPHOCYTES NFR BLD AUTO: 33 %
MCH RBC QN AUTO: 28.8 PG (ref 26.6–33)
MCHC RBC AUTO-ENTMCNC: 32.5 G/DL (ref 31.5–35.7)
MCV RBC AUTO: 89 FL (ref 79–97)
MONOCYTES # BLD AUTO: 0.7 X10E3/UL (ref 0.1–0.9)
MONOCYTES NFR BLD AUTO: 10 %
NEUTROPHILS # BLD AUTO: 3.7 X10E3/UL (ref 1.4–7)
NEUTROPHILS NFR BLD AUTO: 53 %
PLATELET # BLD AUTO: 396 X10E3/UL (ref 150–450)
POTASSIUM SERPL-SCNC: 4.9 MMOL/L (ref 3.5–5.2)
PROT SERPL-MCNC: 7.1 G/DL (ref 6–8.5)
RBC # BLD AUTO: 4.93 X10E6/UL (ref 3.77–5.28)
SODIUM SERPL-SCNC: 134 MMOL/L (ref 134–144)
T4 FREE SERPL-MCNC: 1.2 NG/DL (ref 0.82–1.77)
TRIGL SERPL-MCNC: 152 MG/DL (ref 0–149)
TSH SERPL DL<=0.005 MIU/L-ACNC: 3.07 UIU/ML (ref 0.45–4.5)
VIT B12 SERPL-MCNC: 347 PG/ML (ref 232–1245)
VLDLC SERPL CALC-MCNC: 26 MG/DL (ref 5–40)
WBC # BLD AUTO: 7 X10E3/UL (ref 3.4–10.8)

## 2024-04-18 ENCOUNTER — HOSPITAL ENCOUNTER (OUTPATIENT)
Dept: MAMMOGRAPHY | Facility: HOSPITAL | Age: 79
Discharge: HOME | End: 2024-04-18
Admitting: FAMILY MEDICINE
Payer: MEDICARE

## 2024-04-18 DIAGNOSIS — Z12.31 SCREENING MAMMOGRAM FOR BREAST CANCER: ICD-10-CM

## 2024-04-18 PROCEDURE — 77063 BREAST TOMOSYNTHESIS BI: CPT

## 2024-04-18 PROCEDURE — 77067 SCR MAMMO BI INCL CAD: CPT

## 2024-05-13 ENCOUNTER — TELEPHONE (OUTPATIENT)
Dept: FAMILY MEDICINE CLINIC | Facility: CLINIC | Age: 79
End: 2024-05-13
Payer: MEDICARE

## 2024-05-13 DIAGNOSIS — F41.9 ANXIETY DISORDER, UNSPECIFIED TYPE: ICD-10-CM

## 2024-05-13 RX ORDER — SERTRALINE HYDROCHLORIDE 25 MG/1
25 TABLET, FILM COATED ORAL DAILY
COMMUNITY
Start: 2024-03-18 | End: 2024-05-13 | Stop reason: SDUPTHER

## 2024-05-13 RX ORDER — SERTRALINE HYDROCHLORIDE 25 MG/1
25 TABLET, FILM COATED ORAL DAILY
Qty: 90 TABLET | Refills: 1 | Status: SHIPPED | OUTPATIENT
Start: 2024-05-13

## 2024-05-13 NOTE — TELEPHONE ENCOUNTER
DELETE AFTER REVIEWING: Send the encounter HIGH priority, If patient has less than a 3 day supply. If the patient will run out of medication over the weekend add that information to the additional details line. Send to the appropriate pool. Check your call action grid or workflows.    Caller: JuárezDeja    Relationship: Self    Best call back number: [phone]     Requested Prescriptions:   Requested Prescriptions      No prescriptions requested or ordered in this encounter      SERTRALINE     Pharmacy where request should be sent: [facility] [id number] [facility] [id number] [facility] [address] [facility] - [phone]  - [phone] FX     Last office visit with prescribing clinician: 2/6/2024   Last telemedicine visit with prescribing clinician: Visit date not found   Next office visit with prescribing clinician: 7/9/2024     Additional details provided by patient: PATIENT IS OUT OF MEDICATION     Does the patient have less than a 3 day supply:  [x] Yes  [] No    Would you like a call back once the refill request has been completed: [] Yes [x] No    If the office needs to give you a call back, can they leave a voicemail: [] Yes [x] No    Mele Sosa Rep   05/13/24 11:48 EDT         SERTRALINE

## 2024-07-08 ENCOUNTER — OFFICE VISIT (OUTPATIENT)
Dept: FAMILY MEDICINE CLINIC | Facility: CLINIC | Age: 79
End: 2024-07-08
Payer: MEDICARE

## 2024-07-08 VITALS
SYSTOLIC BLOOD PRESSURE: 126 MMHG | HEART RATE: 73 BPM | DIASTOLIC BLOOD PRESSURE: 70 MMHG | TEMPERATURE: 97 F | BODY MASS INDEX: 26.61 KG/M2 | HEIGHT: 65 IN | OXYGEN SATURATION: 96 % | WEIGHT: 159.7 LBS

## 2024-07-08 DIAGNOSIS — I48.0 PAF (PAROXYSMAL ATRIAL FIBRILLATION): Chronic | ICD-10-CM

## 2024-07-08 DIAGNOSIS — K21.9 GASTROESOPHAGEAL REFLUX DISEASE, UNSPECIFIED WHETHER ESOPHAGITIS PRESENT: Chronic | ICD-10-CM

## 2024-07-08 DIAGNOSIS — Z91.199 NON-COMPLIANCE: ICD-10-CM

## 2024-07-08 DIAGNOSIS — E78.5 HYPERLIPIDEMIA, UNSPECIFIED HYPERLIPIDEMIA TYPE: Primary | Chronic | ICD-10-CM

## 2024-07-08 PROBLEM — Z23 IMMUNIZATION DUE: Status: RESOLVED | Noted: 2024-02-06 | Resolved: 2024-07-08

## 2024-07-08 PROBLEM — Z01.810 PREOP CARDIOVASCULAR EXAM: Status: RESOLVED | Noted: 2017-02-08 | Resolved: 2024-07-08

## 2024-07-08 PROBLEM — Z78.0 POST-MENOPAUSAL: Status: RESOLVED | Noted: 2024-02-06 | Resolved: 2024-07-08

## 2024-07-08 PROCEDURE — 99214 OFFICE O/P EST MOD 30 MIN: CPT | Performed by: STUDENT IN AN ORGANIZED HEALTH CARE EDUCATION/TRAINING PROGRAM

## 2024-07-08 PROCEDURE — 1159F MED LIST DOCD IN RCRD: CPT | Performed by: STUDENT IN AN ORGANIZED HEALTH CARE EDUCATION/TRAINING PROGRAM

## 2024-07-08 PROCEDURE — 1160F RVW MEDS BY RX/DR IN RCRD: CPT | Performed by: STUDENT IN AN ORGANIZED HEALTH CARE EDUCATION/TRAINING PROGRAM

## 2024-07-08 PROCEDURE — 1126F AMNT PAIN NOTED NONE PRSNT: CPT | Performed by: STUDENT IN AN ORGANIZED HEALTH CARE EDUCATION/TRAINING PROGRAM

## 2024-07-08 RX ORDER — METOPROLOL SUCCINATE 50 MG/1
50 TABLET, EXTENDED RELEASE ORAL NIGHTLY
Qty: 90 TABLET | Refills: 1 | Status: SHIPPED | OUTPATIENT
Start: 2024-07-08

## 2024-07-08 RX ORDER — ROSUVASTATIN CALCIUM 10 MG/1
10 TABLET, COATED ORAL NIGHTLY
Qty: 90 TABLET | Refills: 3 | Status: SHIPPED | OUTPATIENT
Start: 2024-07-08

## 2024-07-08 RX ORDER — MELATONIN
1000 DAILY
Qty: 30 TABLET | Refills: 5 | Status: SHIPPED | OUTPATIENT
Start: 2024-07-08

## 2024-07-08 RX ORDER — FAMOTIDINE 20 MG/1
20 TABLET, FILM COATED ORAL 2 TIMES DAILY PRN
Qty: 60 TABLET | Refills: 3 | Status: SHIPPED | OUTPATIENT
Start: 2024-07-08

## 2024-07-08 RX ORDER — SERTRALINE HYDROCHLORIDE 25 MG/1
25 TABLET, FILM COATED ORAL DAILY
Qty: 90 TABLET | Refills: 1 | Status: SHIPPED | OUTPATIENT
Start: 2024-07-08

## 2024-07-08 RX ORDER — ANTACID TABLETS 500 MG/1
1 TABLET, CHEWABLE ORAL DAILY
Qty: 90 EACH | Refills: 3 | Status: SHIPPED | OUTPATIENT
Start: 2024-07-08

## 2024-07-08 NOTE — PROGRESS NOTES
"Chief Complaint  Follow-up (Pt is here for follow up. No CC)    Subjective        Deja Juárez presents to [facility]  History of Present Illness  79-year-old white female with a history of hyperlipidemia and GERD here for chronic disease management follow-up visit.  Patient reports she is doing well.    Pt refused to take eliquis for hx of paroxysmal Afib, pt reports she is fine taking Asprin.    Treatment plan discussed with patient, risks and complications resulting form not complying with the treatment plan discussed with patient.  Patient voiced understanding and accepts the risks of not following medical management instructions and recommendations.      Pt refused DEXA and colorectal screening.    Pt has lost 12 lbs intentionally with diet and lifestyle modifications and patient is proud of her weight loss states she feels better.  Counseled patient weight loss should be appropriate for the target BMI of 25, patient was understanding.      Objective   Vital Signs:  /70 (BP Location: Left arm, Patient Position: Sitting, Cuff Size: Adult)   Pulse 73   Temp 97 °F (36.1 °C) (Temporal)   Ht 165.1 cm (65\")   Wt 72.4 kg (159 lb 11.2 oz)   SpO2 96%   BMI 26.58 kg/m²   Estimated body mass index is 26.58 kg/m² as calculated from the following:    Height as of this encounter: 165.1 cm (65\").    Weight as of this encounter: 72.4 kg (159 lb 11.2 oz).               Physical Exam  Constitutional:       Appearance: Normal appearance.   HENT:      Head: Normocephalic and atraumatic.   Eyes:      Conjunctiva/sclera: Conjunctivae normal.   Cardiovascular:      Rate and Rhythm: Normal rate and regular rhythm.      Heart sounds: Normal heart sounds.   Pulmonary:      Effort: Pulmonary effort is normal.      Breath sounds: Normal breath sounds.   Abdominal:      General: Bowel sounds are normal.      Palpations: Abdomen is soft.      Comments: Non-tender   Skin:     General: Skin is " warm.   Neurological:      General: No focal deficit present.      Mental Status: She is alert and oriented to person, place, and time.   Psychiatric:         Mood and Affect: Mood normal.         Behavior: Behavior normal.        Result Review :    The following data was reviewed by: FRIDA Boyd on 07/08/2024:  CMP          2/6/2024    11:13   CMP   Glucose 93    BUN 10    Creatinine 0.75    Sodium 134    Potassium 4.9    Chloride 96    Calcium 9.8    Total Protein 7.1    Albumin 4.7    Globulin 2.4    Total Bilirubin 0.9    Alkaline Phosphatase 91    AST (SGOT) 22    ALT (SGPT) 15    BUN/Creatinine Ratio 13      CBC          2/6/2024    11:13   CBC   WBC 7.0    RBC 4.93    Hemoglobin 14.2    Hematocrit 43.7    MCV 89    MCH 28.8    MCHC 32.5    RDW 12.3    Platelets 396      Lipid Panel          2/6/2024    11:13   Lipid Panel   Total Cholesterol 183    Triglycerides 152    HDL Cholesterol 60    VLDL Cholesterol 26    LDL Cholesterol  97      TSH          2/6/2024    11:13   TSH   TSH 3.070            Data reviewed : Cardiology studies reviewed last stress test report from May 7, 2023 and the stress test was normal.             Assessment and Plan     Diagnoses and all orders for this visit:    1. Hyperlipidemia, unspecified hyperlipidemia type (Primary)  Assessment & Plan:   Lipid abnormalities are stable    Plan:  Continue same medication/s without change.      Discussed medication dosage, use, side effects, and goals of treatment in detail.    Counseled patient on lifestyle modifications to help control hyperlipidemia.     Patient Treatment Goals:   LDL goal is less than 70    Followup in 6 months..  Discussed the importance of healthy diet, nutrition, and lifestyle. Recommend low salt, fat/cholesterol diet and avoid concentrated sweets. Encouraged DASH diet along with fresh fruits & vegetables and low fat dairy products. Counseled patient to exercise/walk as tolerated. Avoid tobacco and  alcohol use.      Orders:  -     rosuvastatin (Crestor) 10 MG tablet; Take 1 tablet by mouth Every Night.  Dispense: 90 tablet; Refill: 3    2. PAF (paroxysmal atrial fibrillation)  Assessment & Plan:  Patient refused anticoagulants reports she desires aspirin.  Patient heart rate was regular on auscultation today.    Orders:  -     metoprolol succinate XL (TOPROL-XL) 50 MG 24 hr tablet; Take 1 tablet by mouth Every Night.  Dispense: 90 tablet; Refill: 1    3. Gastroesophageal reflux disease, unspecified whether esophagitis present  Assessment & Plan:  Stable.  GERD precautions: Avoid fatty, greasy, spicy food, if current tobacco use stop smoking, stay upright for one hour before lying down. Also avoid Tobacco, caffeine, soda, mint, and garlic.      Orders:  -     famotidine (PEPCID) 20 MG tablet; Take 1 tablet by mouth 2 (Two) Times a Day As Needed for Heartburn.  Dispense: 60 tablet; Refill: 3    4. Non-compliance    Other orders  -     sertraline (ZOLOFT) 25 MG tablet; Take 1 tablet by mouth Daily.  Dispense: 90 tablet; Refill: 1  -     cholecalciferol (Vitamin D) 25 MCG (1000 UT) tablet; Take 1 tablet by mouth Daily.  Dispense: 30 tablet; Refill: 5  -     Calcium Carbonate 500 MG chewable tablet; Chew 1 tablet Daily.  Dispense: 90 each; Refill: 3        All chronic conditions have been addressed and treated by the practice or other specialists. Medications have been reconciled and refilled as appropriate. Reiterated compliance and timely follow up appointments. Side effects of all new and old medications reviewed with the patient and patient willing to accept all risks involved. Advised RTO if no improvement or worsening of symptoms or if any new complaints arise. Patient advised to follow up with clinic or call after diagnostic tests, if patient does not hear from office 3 days after the test completion.          Follow Up     Return in about 6 months (around 1/8/2025) for Next scheduled follow up.  Patient was  given instructions and counseling regarding her condition or for health maintenance advice. Please see specific information pulled into the AVS if appropriate.

## 2024-07-08 NOTE — ASSESSMENT & PLAN NOTE
Patient refused anticoagulants reports she desires aspirin.  Patient heart rate was regular on auscultation today.

## 2024-07-08 NOTE — ASSESSMENT & PLAN NOTE
Stable.  GERD precautions: Avoid fatty, greasy, spicy food, if current tobacco use stop smoking, stay upright for one hour before lying down. Also avoid Tobacco, caffeine, soda, mint, and garlic.

## 2024-07-08 NOTE — ASSESSMENT & PLAN NOTE
Lipid abnormalities are stable    Plan:  Continue same medication/s without change.      Discussed medication dosage, use, side effects, and goals of treatment in detail.    Counseled patient on lifestyle modifications to help control hyperlipidemia.     Patient Treatment Goals:   LDL goal is less than 70    Followup in 6 months..  Discussed the importance of healthy diet, nutrition, and lifestyle. Recommend low salt, fat/cholesterol diet and avoid concentrated sweets. Encouraged DASH diet along with fresh fruits & vegetables and low fat dairy products. Counseled patient to exercise/walk as tolerated. Avoid tobacco and alcohol use.

## 2024-10-07 ENCOUNTER — OFFICE VISIT (OUTPATIENT)
Dept: FAMILY MEDICINE CLINIC | Facility: CLINIC | Age: 79
End: 2024-10-07
Payer: MEDICARE

## 2024-10-07 ENCOUNTER — HOSPITAL ENCOUNTER (OUTPATIENT)
Dept: GENERAL RADIOLOGY | Facility: HOSPITAL | Age: 79
Discharge: HOME | End: 2024-10-07
Payer: MEDICARE

## 2024-10-07 VITALS
SYSTOLIC BLOOD PRESSURE: 120 MMHG | BODY MASS INDEX: 26.06 KG/M2 | HEART RATE: 65 BPM | OXYGEN SATURATION: 97 % | DIASTOLIC BLOOD PRESSURE: 70 MMHG | WEIGHT: 156.6 LBS | TEMPERATURE: 97.7 F

## 2024-10-07 DIAGNOSIS — R07.81 RIB PAIN ON RIGHT SIDE: ICD-10-CM
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
## 2024-10-07 DIAGNOSIS — I48.0 PAF (PAROXYSMAL ATRIAL FIBRILLATION): ICD-10-CM
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
## 2024-10-07 PROCEDURE — 1159F MED LIST DOCD IN RCRD: CPT

## 2024-10-07 PROCEDURE — 1125F AMNT PAIN NOTED PAIN PRSNT: CPT

## 2024-10-07 PROCEDURE — 71101 X-RAY EXAM UNILAT RIBS/CHEST: CPT

## 2024-10-07 PROCEDURE — 1160F RVW MEDS BY RX/DR IN RCRD: CPT

## 2024-10-07 PROCEDURE — 99214 OFFICE O/P EST MOD 30 MIN: CPT

## 2024-10-07 RX ORDER — LIDOCAINE 50 MG/G
1 PATCH TOPICAL EVERY 24 HOURS
Qty: 30 EACH | Refills: 1 | Status: SHIPPED | OUTPATIENT
Start: 2024-10-07

## 2024-10-07 NOTE — PROGRESS NOTES
"Chief Complaint  Fall (Pt fell Thursday morning at home pt hurt right back rib )    Subjective        Deja Juárez presents to [facility]  History of Present Illness  Patient is a 79 y.o. female who presents to the office today for right rib pain.  She is new to me but a patient of FRIDA Kirkland. She states that she had a fall about 4 days ago.  She was attempting to plunger toilet and states she was moving too quickly and lost her balance.  She did not hit her head or lose consciousness.  She did fall on her right rib area.  She endorses mild bruising.  She denies any prior falls and states that she is always in a hurry and knows she needs to slow down.    He has a history of atrial fibrillation, and is prescribed Eliquis but states that she will not take it. She stopped taking it about 6 months ago and started on a baby aspirin on her own. She states she understands her risk and does not want to take eliquis.       Objective   Vital Signs:  /70 (BP Location: Left arm, Patient Position: Sitting, Cuff Size: Adult)   Pulse 65   Temp 97.7 °F (36.5 °C) (Temporal)   Wt 71 kg (156 lb 9.6 oz)   SpO2 97%   BMI 26.06 kg/m²   Estimated body mass index is 26.06 kg/m² as calculated from the following:    Height as of 7/8/24: 165.1 cm (65\").    Weight as of this encounter: 71 kg (156 lb 9.6 oz).            Physical Exam  Constitutional:       Appearance: Normal appearance.   HENT:      Head: Normocephalic.   Cardiovascular:      Rate and Rhythm: Normal rate and regular rhythm.      Heart sounds: Normal heart sounds.   Pulmonary:      Effort: Pulmonary effort is normal.      Breath sounds: Normal breath sounds.   Chest:      Chest wall: Tenderness (Right sided) present.          Comments: Mild healing bruise on right chest wall.  Musculoskeletal:      Cervical back: Neck supple.   Neurological:      Mental Status: She is alert and oriented to person, place, and time. "   Psychiatric:         Mood and Affect: Mood normal.        Result Review :  The following data was reviewed by: FRIDA Richter on 10/07/2024:  Common labs          2/6/2024    11:13   Common Labs   Glucose 93    BUN 10    Creatinine 0.75    Sodium 134    Potassium 4.9    Chloride 96    Calcium 9.8    Total Protein 7.1    Albumin 4.7    Total Bilirubin 0.9    Alkaline Phosphatase 91    AST (SGOT) 22    ALT (SGPT) 15    WBC 7.0    Hemoglobin 14.2    Hematocrit 43.7    Platelets 396    Total Cholesterol 183    Triglycerides 152    HDL Cholesterol 60    LDL Cholesterol  97                Assessment and Plan   Diagnoses and all orders for this visit:    1. Fall, initial encounter (Primary)  Assessment & Plan:  Recommended physical therapy, she declined.    Orders:  -     XR Ribs Right With PA Chest; Future    2. Rib pain on right side  -     XR Ribs Right With PA Chest; Future  -     lidocaine (LIDODERM) 5 %; Place 1 patch on the skin as directed by provider Daily. Remove & Discard patch within 12 hours or as directed by MD  Dispense: 30 each; Refill: 1    3. PAF (paroxysmal atrial fibrillation)  Assessment & Plan:  Appointment with Dr. Garcia on 12/10/2024.  Instructed her to let their office know that she is not taking Eliquis.      Patient agrees with plan of care and understands instructions. Call if worsening symptoms or any problems or concerns.     EMR Dragon/Transcription Disclaimer:  Much of this encounter note is an electronic transcription/translation of spoken language to printed text.  The electronic translation of spoken language may permit erroneous, or at times, nonsensical words or phrases to be inadvertently transcribed; Although I have reviewed the note for such errors, some may still exist.           Follow Up   No follow-ups on file.  Patient was given instructions and counseling regarding her condition or for health maintenance advice. Please see specific information pulled into the AVS  if appropriate.

## 2024-10-09 ENCOUNTER — TELEPHONE (OUTPATIENT)
Dept: FAMILY MEDICINE CLINIC | Facility: CLINIC | Age: 79
End: 2024-10-09
Payer: MEDICARE

## 2024-10-09 NOTE — TELEPHONE ENCOUNTER
PATIENT CALLED IN REGARDS OF XRAY RESULTS.    SHE WAS SEEN BY MARVA ON 10/7/24    CALL BACK NUMBER [phone]
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
## 2024-10-11 NOTE — ASSESSMENT & PLAN NOTE
Appointment with Dr. Garcia on 12/10/2024.  Instructed her to let their office know that she is not taking Eliquis.

## 2024-10-14 ENCOUNTER — TELEPHONE (OUTPATIENT)
Dept: FAMILY MEDICINE CLINIC | Facility: CLINIC | Age: 79
End: 2024-10-14

## 2024-10-14 NOTE — TELEPHONE ENCOUNTER
Called and spoke with pt regarding results.  She voiced understanding.  Will mail out results per pt's request.

## 2024-10-14 NOTE — TELEPHONE ENCOUNTER
Caller: Deja Juárez    Relationship: Self    Best call back number: [phone]     What test was performed: XRAY     When was the test performed: MONDAY, 10/07    Where was the test performed: HOSPITAL    Additional notes: PATIENT STATED SHE HAS NOT RECEIVED A CALL REGARDING RESULTS YET. PLEASE ADVISE.

## 2024-11-19 ENCOUNTER — OFFICE VISIT (OUTPATIENT)
Dept: FAMILY MEDICINE CLINIC | Facility: CLINIC | Age: 79
End: 2024-11-19
Payer: MEDICARE

## 2024-11-19 VITALS
OXYGEN SATURATION: 97 % | HEART RATE: 61 BPM | WEIGHT: 157.6 LBS | BODY MASS INDEX: 26.26 KG/M2 | DIASTOLIC BLOOD PRESSURE: 74 MMHG | TEMPERATURE: 97.8 F | RESPIRATION RATE: 16 BRPM | SYSTOLIC BLOOD PRESSURE: 126 MMHG | HEIGHT: 65 IN

## 2024-11-19 DIAGNOSIS — R73.9 HYPERGLYCEMIA: ICD-10-CM

## 2024-11-19 DIAGNOSIS — J30.89 ENVIRONMENTAL AND SEASONAL ALLERGIES: Primary | ICD-10-CM

## 2024-11-19 DIAGNOSIS — I48.0 PAF (PAROXYSMAL ATRIAL FIBRILLATION): Chronic | ICD-10-CM

## 2024-11-19 DIAGNOSIS — E78.5 HYPERLIPIDEMIA, UNSPECIFIED HYPERLIPIDEMIA TYPE: Chronic | ICD-10-CM

## 2024-11-19 DIAGNOSIS — R35.1 NOCTURIA: ICD-10-CM

## 2024-11-19 DIAGNOSIS — M85.80 OSTEOPENIA, UNSPECIFIED LOCATION: ICD-10-CM

## 2024-11-19 DIAGNOSIS — F41.9 ANXIETY DISORDER, UNSPECIFIED TYPE: ICD-10-CM

## 2024-11-19 PROBLEM — U07.1 COVID-19 VIRUS INFECTION: Status: RESOLVED | Noted: 2021-11-30 | Resolved: 2024-11-19

## 2024-11-19 PROCEDURE — 1125F AMNT PAIN NOTED PAIN PRSNT: CPT | Performed by: STUDENT IN AN ORGANIZED HEALTH CARE EDUCATION/TRAINING PROGRAM

## 2024-11-19 PROCEDURE — 99214 OFFICE O/P EST MOD 30 MIN: CPT | Performed by: STUDENT IN AN ORGANIZED HEALTH CARE EDUCATION/TRAINING PROGRAM

## 2024-11-19 RX ORDER — FLUTICASONE PROPIONATE 50 MCG
2 SPRAY, SUSPENSION (ML) NASAL DAILY
Qty: 16 G | Refills: 11 | Status: SHIPPED | OUTPATIENT
Start: 2024-11-19

## 2024-11-19 RX ORDER — METOPROLOL SUCCINATE 50 MG/1
50 TABLET, EXTENDED RELEASE ORAL NIGHTLY
Qty: 90 TABLET | Refills: 1 | Status: SHIPPED | OUTPATIENT
Start: 2024-11-19

## 2024-11-19 RX ORDER — ANTACID TABLETS 500 MG/1
1 TABLET, CHEWABLE ORAL DAILY
Qty: 90 EACH | Refills: 3 | Status: SHIPPED | OUTPATIENT
Start: 2024-11-19

## 2024-11-19 RX ORDER — ROSUVASTATIN CALCIUM 10 MG/1
10 TABLET, COATED ORAL NIGHTLY
Qty: 90 TABLET | Refills: 3 | Status: SHIPPED | OUTPATIENT
Start: 2024-11-19

## 2024-11-19 RX ORDER — SERTRALINE HYDROCHLORIDE 25 MG/1
25 TABLET, FILM COATED ORAL DAILY
Qty: 90 TABLET | Refills: 1 | Status: SHIPPED | OUTPATIENT
Start: 2024-11-19

## 2024-11-19 RX ORDER — ASPIRIN 81 MG/1
81 TABLET ORAL DAILY
Qty: 90 TABLET | Refills: 3 | Status: SHIPPED | OUTPATIENT
Start: 2024-11-19

## 2024-11-19 RX ORDER — CETIRIZINE HYDROCHLORIDE 10 MG/1
5 TABLET ORAL NIGHTLY
Qty: 30 TABLET | Refills: 5 | Status: SHIPPED | OUTPATIENT
Start: 2024-11-19

## 2024-11-19 RX ORDER — CHOLECALCIFEROL (VITAMIN D3) 25 MCG
1000 TABLET ORAL DAILY
Qty: 30 TABLET | Refills: 5 | Status: SHIPPED | OUTPATIENT
Start: 2024-11-19

## 2024-11-19 NOTE — PROGRESS NOTES
"Chief Complaint  Nasal Congestion (Declined COVID/FLU test; denies F/C.  )    Subjective        Deja Juárez presents to [facility]  History of Present Illness  79-year-old white female here complaining of nasal congestion, and patient reports she is here for routine follow-up visit.  Patient denied any other complaints.  Patient states she does not have any symptoms of upper respiratory tract infection at this time.    Discussed osteopenia on previous DEXA scan and the need to continue calcium and vitamin D and also instructed patient to schedule her DEXA scan that has already been ordered earlier this year, patient voiced understanding.    Patient still states she does not want to take an anticoagulant for A-fib and is not taking Eliquis at this time.  However patient states she is taking a baby aspirin daily for some blood thinning.  Patient states she is aware of the risk of not taking anticoagulant such as stroke but still refuses to take Eliquis.    Patient reports anxiety depression under control on SSRI.  Denied any suicidal or homicidal ideation.  Patient complaining of nocturia and desires further evaluation.    Patient reports she was seen for a fall as she was moving too fast and tripped at home but states she does not have any musculoskeletal pain or gait issues related to the fall and patient states that everything has resolved completely.      Objective   Vital Signs:  /74 (BP Location: Left arm, Patient Position: Sitting, Cuff Size: Adult)   Pulse 61   Temp 97.8 °F (36.6 °C) (Temporal)   Resp 16   Ht 165.1 cm (65\")   Wt 71.5 kg (157 lb 9.6 oz)   SpO2 97%   BMI 26.23 kg/m²   Estimated body mass index is 26.23 kg/m² as calculated from the following:    Height as of this encounter: 165.1 cm (65\").    Weight as of this encounter: 71.5 kg (157 lb 9.6 oz).               Physical Exam  Constitutional:       Appearance: Normal appearance.   HENT:      Head: " Normocephalic and atraumatic.   Eyes:      Conjunctiva/sclera: Conjunctivae normal.   Cardiovascular:      Rate and Rhythm: Normal rate and regular rhythm.      Heart sounds: Normal heart sounds.   Pulmonary:      Effort: Pulmonary effort is normal.      Breath sounds: Normal breath sounds.   Abdominal:      General: Bowel sounds are normal.      Palpations: Abdomen is soft.      Comments: Non-tender   Skin:     General: Skin is warm.   Neurological:      General: No focal deficit present.      Mental Status: She is alert and oriented to person, place, and time.   Psychiatric:         Mood and Affect: Mood normal.         Behavior: Behavior normal.        Result Review :    The following data was reviewed by: FRIDA Boyd on 11/19/2024:  CMP          2/6/2024    11:13   CMP   Glucose 93    BUN 10    Creatinine 0.75    Sodium 134    Potassium 4.9    Chloride 96    Calcium 9.8    Total Protein 7.1    Albumin 4.7    Globulin 2.4    Total Bilirubin 0.9    Alkaline Phosphatase 91    AST (SGOT) 22    ALT (SGPT) 15    BUN/Creatinine Ratio 13      CBC          2/6/2024    11:13   CBC   WBC 7.0    RBC 4.93    Hemoglobin 14.2    Hematocrit 43.7    MCV 89    MCH 28.8    MCHC 32.5    RDW 12.3    Platelets 396      Lipid Panel          2/6/2024    11:13   Lipid Panel   Total Cholesterol 183    Triglycerides 152    HDL Cholesterol 60    VLDL Cholesterol 26    LDL Cholesterol  97      TSH          2/6/2024    11:13   TSH   TSH 3.070                         Assessment and Plan     Diagnoses and all orders for this visit:    1. Environmental and seasonal allergies (Primary)  Assessment & Plan:  Instructed patient to take cetirizine and Flonase.      Orders:  -     fluticasone (FLONASE) 50 MCG/ACT nasal spray; Administer 2 sprays into the nostril(s) as directed by provider Daily.  Dispense: 16 g; Refill: 11  -     cetirizine (zyrTEC) 10 MG tablet; Take 0.5 tablets by mouth Every Night.  Dispense: 30 tablet; Refill:  5    2. PAF (paroxysmal atrial fibrillation)  Assessment & Plan:  Patient currently on metoprolol and aspirin.  Patient does follow cardiology annually.  Instructed patient to once again discuss anticoagulant Eliquis with her cardiologist on follow-up visit.  Patient counseled on risk of stroke if blood is not adequately anticoagulated due to history of A-fib.  Her patient is asked to continue just aspirin once a day.    Orders:  -     metoprolol succinate XL (TOPROL-XL) 50 MG 24 hr tablet; Take 1 tablet by mouth Every Night.  Dispense: 90 tablet; Refill: 1  -     aspirin (ASPIR) 81 MG EC tablet; Take 1 tablet by mouth Daily.  Dispense: 90 tablet; Refill: 3  -     CBC & Differential  -     Comprehensive Metabolic Panel  -     TSH  -     T4, free    3. Hyperlipidemia, unspecified hyperlipidemia type  Assessment & Plan:   Lipid abnormalities are stable    Plan:  Continue same medication/s without change.      Discussed medication dosage, use, side effects, and goals of treatment in detail.    Counseled patient on lifestyle modifications to help control hyperlipidemia.     Patient Treatment Goals:   LDL goal is less than 70    Followup in 6 months.  Instructed patient to return fasting on follow-up visit for fasting lipid panel.  Discussed the importance of healthy diet, nutrition, and lifestyle. Recommend low salt, fat/cholesterol diet and avoid concentrated sweets. Encouraged DASH diet along with fresh fruits & vegetables and low fat dairy products. Counseled patient to exercise/walk as tolerated. Avoid tobacco and alcohol use.      Orders:  -     rosuvastatin (Crestor) 10 MG tablet; Take 1 tablet by mouth Every Night.  Dispense: 90 tablet; Refill: 3    4. Osteopenia, unspecified location  Assessment & Plan:  Continue calcium and vitamin D.  Instructed patient to complete the bone scan ordered this year.    Orders:  -     cholecalciferol (Vitamin D) 25 MCG (1000 UT) tablet; Take 1 tablet by mouth Daily.  Dispense: 30  tablet; Refill: 5  -     Calcium Carbonate 500 MG chewable tablet; Chew 1 tablet Daily.  Dispense: 90 each; Refill: 3  -     Vitamin D,25-Hydroxy    5. Nocturia  -     Hemoglobin A1c  -     Urinalysis With Microscopic - Urine, Clean Catch    6. Hyperglycemia  -     Hemoglobin A1c    7. Anxiety disorder, unspecified type  Assessment & Plan:  Stable on sertraline.  Patient denied any suicidal or homicidal ideation.    Orders:  -     sertraline (ZOLOFT) 25 MG tablet; Take 1 tablet by mouth Daily.  Dispense: 90 tablet; Refill: 1        All chronic conditions have been addressed and treated by the practice or other specialists. Medications have been reconciled and refilled as appropriate. Reiterated compliance and timely follow up appointments. Side effects of all new and old medications reviewed with the patient and patient willing to accept all risks involved. Advised RTO if no improvement or worsening of symptoms or if any new complaints arise. Patient advised to follow up with clinic or call after diagnostic tests, if patient does not hear from office 3 days after the test completion.          Follow Up     Return in about 3 months (around 2/19/2025) for Next scheduled follow up, Medicare Wellness.  Patient was given instructions and counseling regarding her condition or for health maintenance advice. Please see specific information pulled into the AVS if appropriate.

## 2024-11-19 NOTE — ASSESSMENT & PLAN NOTE
Patient currently on metoprolol and aspirin.  Patient does follow cardiology annually.  Instructed patient to once again discuss anticoagulant Eliquis with her cardiologist on follow-up visit.  Patient counseled on risk of stroke if blood is not adequately anticoagulated due to history of A-fib.  Her patient is asked to continue just aspirin once a day.

## 2024-11-19 NOTE — ASSESSMENT & PLAN NOTE
Lipid abnormalities are stable    Plan:  Continue same medication/s without change.      Discussed medication dosage, use, side effects, and goals of treatment in detail.    Counseled patient on lifestyle modifications to help control hyperlipidemia.     Patient Treatment Goals:   LDL goal is less than 70    Followup in 6 months.  Instructed patient to return fasting on follow-up visit for fasting lipid panel.  Discussed the importance of healthy diet, nutrition, and lifestyle. Recommend low salt, fat/cholesterol diet and avoid concentrated sweets. Encouraged DASH diet along with fresh fruits & vegetables and low fat dairy products. Counseled patient to exercise/walk as tolerated. Avoid tobacco and alcohol use.

## 2024-11-20 DIAGNOSIS — E55.9 VITAMIN D DEFICIENCY: Primary | ICD-10-CM

## 2024-11-20 LAB
25(OH)D3+25(OH)D2 SERPL-MCNC: 23.8 NG/ML (ref 30–100)
ALBUMIN SERPL-MCNC: 4.4 G/DL (ref 3.8–4.8)
ALP SERPL-CCNC: 99 IU/L (ref 44–121)
ALT SERPL-CCNC: 17 IU/L (ref 0–32)
APPEARANCE UR: CLEAR
AST SERPL-CCNC: 20 IU/L (ref 0–40)
BACTERIA #/AREA URNS HPF: NORMAL /[HPF]
BASOPHILS # BLD AUTO: 0.1 X10E3/UL (ref 0–0.2)
BASOPHILS NFR BLD AUTO: 1 %
BILIRUB SERPL-MCNC: 0.9 MG/DL (ref 0–1.2)
BILIRUB UR QL STRIP: NEGATIVE
BUN SERPL-MCNC: 14 MG/DL (ref 8–27)
BUN/CREAT SERPL: 19 (ref 12–28)
CALCIUM SERPL-MCNC: 9.6 MG/DL (ref 8.7–10.3)
CASTS URNS QL MICRO: NORMAL /LPF
CHLORIDE SERPL-SCNC: 99 MMOL/L (ref 96–106)
CO2 SERPL-SCNC: 24 MMOL/L (ref 20–29)
COLOR UR: YELLOW
CREAT SERPL-MCNC: 0.75 MG/DL (ref 0.57–1)
EGFRCR SERPLBLD CKD-EPI 2021: 81 ML/MIN/1.73
EOSINOPHIL # BLD AUTO: 0.2 X10E3/UL (ref 0–0.4)
EOSINOPHIL NFR BLD AUTO: 3 %
EPI CELLS #/AREA URNS HPF: NORMAL /HPF (ref 0–10)
ERYTHROCYTE [DISTWIDTH] IN BLOOD BY AUTOMATED COUNT: 11.8 % (ref 11.7–15.4)
GLOBULIN SER CALC-MCNC: 2.4 G/DL (ref 1.5–4.5)
GLUCOSE SERPL-MCNC: 89 MG/DL (ref 70–99)
GLUCOSE UR QL STRIP: NEGATIVE
HBA1C MFR BLD: 5.6 % (ref 4.8–5.6)
HCT VFR BLD AUTO: 42.5 % (ref 34–46.6)
HGB BLD-MCNC: 14.1 G/DL (ref 11.1–15.9)
HGB UR QL STRIP: NEGATIVE
IMM GRANULOCYTES # BLD AUTO: 0 X10E3/UL (ref 0–0.1)
IMM GRANULOCYTES NFR BLD AUTO: 1 %
KETONES UR QL STRIP: NEGATIVE
LEUKOCYTE ESTERASE UR QL STRIP: NEGATIVE
LYMPHOCYTES # BLD AUTO: 2.4 X10E3/UL (ref 0.7–3.1)
LYMPHOCYTES NFR BLD AUTO: 33 %
MCH RBC QN AUTO: 29.5 PG (ref 26.6–33)
MCHC RBC AUTO-ENTMCNC: 33.2 G/DL (ref 31.5–35.7)
MCV RBC AUTO: 89 FL (ref 79–97)
MICRO URNS: NORMAL
MICRO URNS: NORMAL
MONOCYTES # BLD AUTO: 0.8 X10E3/UL (ref 0.1–0.9)
MONOCYTES NFR BLD AUTO: 11 %
NEUTROPHILS # BLD AUTO: 3.9 X10E3/UL (ref 1.4–7)
NEUTROPHILS NFR BLD AUTO: 51 %
NITRITE UR QL STRIP: NEGATIVE
PH UR STRIP: 6.5 [PH] (ref 5–7.5)
PLATELET # BLD AUTO: 388 X10E3/UL (ref 150–450)
POTASSIUM SERPL-SCNC: 5.1 MMOL/L (ref 3.5–5.2)
PROT SERPL-MCNC: 6.8 G/DL (ref 6–8.5)
PROT UR QL STRIP: NEGATIVE
RBC # BLD AUTO: 4.78 X10E6/UL (ref 3.77–5.28)
RBC #/AREA URNS HPF: NORMAL /HPF (ref 0–2)
SODIUM SERPL-SCNC: 136 MMOL/L (ref 134–144)
SP GR UR STRIP: 1.01 (ref 1–1.03)
T4 FREE SERPL-MCNC: 1.1 NG/DL (ref 0.82–1.77)
TSH SERPL DL<=0.005 MIU/L-ACNC: 3.42 UIU/ML (ref 0.45–4.5)
UROBILINOGEN UR STRIP-MCNC: 0.2 MG/DL (ref 0.2–1)
WBC # BLD AUTO: 7.5 X10E3/UL (ref 3.4–10.8)
WBC #/AREA URNS HPF: NORMAL /HPF (ref 0–5)

## 2024-11-20 RX ORDER — ERGOCALCIFEROL 1.25 MG/1
50000 CAPSULE, LIQUID FILLED ORAL WEEKLY
Qty: 12 CAPSULE | Refills: 1 | Status: SHIPPED | OUTPATIENT
Start: 2024-11-20

## 2024-12-10 ENCOUNTER — OFFICE VISIT (OUTPATIENT)
Dept: CARDIOLOGY | Facility: CLINIC | Age: 79
End: 2024-12-10
Payer: MEDICARE

## 2024-12-10 VITALS
HEIGHT: 65 IN | SYSTOLIC BLOOD PRESSURE: 118 MMHG | WEIGHT: 157 LBS | DIASTOLIC BLOOD PRESSURE: 84 MMHG | HEART RATE: 64 BPM | BODY MASS INDEX: 26.16 KG/M2

## 2024-12-10 DIAGNOSIS — E78.5 HYPERLIPIDEMIA, UNSPECIFIED HYPERLIPIDEMIA TYPE: ICD-10-CM

## 2024-12-10 DIAGNOSIS — I25.10 CORONARY ARTERY CALCIFICATION: Primary | ICD-10-CM

## 2024-12-10 PROCEDURE — 1159F MED LIST DOCD IN RCRD: CPT | Performed by: INTERNAL MEDICINE

## 2024-12-10 PROCEDURE — 99214 OFFICE O/P EST MOD 30 MIN: CPT | Performed by: INTERNAL MEDICINE

## 2024-12-10 PROCEDURE — 1160F RVW MEDS BY RX/DR IN RCRD: CPT | Performed by: INTERNAL MEDICINE

## 2024-12-10 PROCEDURE — 93000 ELECTROCARDIOGRAM COMPLETE: CPT | Performed by: INTERNAL MEDICINE

## 2024-12-10 NOTE — PROGRESS NOTES
Date of Office Visit: 12/10/2024  Encounter Provider: Lucy Garcia MD  Place of Service: [facility]  Patient Name: Deja Juárez  :1945      Patient ID:  Deja Juárez is a 79 y.o. female is here for  followup for coronary artery calcification.        History of Present Illness    Deja Juárez has a history of anxiety, depression, hyperlipidemia, coronary artery calcification, abnormal ECG and PAF.        She quit smoking 47 years ago.  She is  and lives with her .  She is retired.  She has 2 children, 7 grandchildren.  She drinks one cup of coffee per day, uses no alcohol or drugs.  Diabetes is in her family in her father as well as hypertension being present in her mother and father.       She had an echocardiogram done 2/10/2017 showing ejection fraction 69% with grade 2 diastolic dysfunction, mild mitral insufficiency.  He had nonischemic stress nuclear perfusion study done 2017. She had an echocardiogram done 3/4/2019 showing ejection fraction 57% with grade 1 diastolic dysfunction, mild mitral insufficiency, thickened aortic valve.  She had a Holter monitor done 2017 showing PVCs and PACs, one nonsustained run of atrial tachycardia otherwise normal sinus rhythm.  There were no pauses or high degree AV block.  This work-up was done for palpitations.  She is placed on metoprolol and has had no further palpitations.     Echo done 3/4/2019 showed ejection fraction 57% with grade 1 diastolic dysfunction, thickening of the aortic valve without insufficiency or stenosis, mild mitral insufficiency, aortic root sclerosis without dilation.  24-hour Holter done 3/4/2019 showed frequent unifocal PVCs with no ventricular tachycardia and occasional unifocal PACs without atrial fibrillation.  She had a 14-day monitor done 2022 which showed 1 episode of ventricular tachycardia lasting 5 beats, 88 episodes supraventricular tachycardia.   The longest episode was 53 seconds of SVT and looks like it could have been atrial fibrillation.  There were 2.9% PACs noted. Echo done 8/31/2022 showed ejection fraction 60% with mild calcification of the aortic valve normal diastolic function, chamber sizes.  Calcium score done 8/2018 showed a total agaston score of 75.  This was mostly concentrated in the LAD.  She was started on Eliquis for PAF.    She had nonischemic stress nuclear perfusion study done 11/29/2023.  Labs 11/9/2024 show normal CMP, normal TSH and free T4, low vitamin D level, normal CBC.  Lipids on 2/6/2024 showed total cholesterol 183, HDL 60, LDL 97, triglycerides 152, VLDL 26.    She lost weight and stopped her Eliquis after losing the weight.  She does not want to go back on it as it was expensive.  She put herself on a baby aspirin.  Her last cardiac monitors did not show any evidence of atrial fibrillation.  She also stopped her rosuvastatin.  She is trying to be more active and does have a treadmill and recumbent bike that she can use.  She has no chest tightness or pressure.  She has no orthopnea or PND.  She has no exertional dyspnea.    Past Medical History:   Diagnosis Date    Anxiety     COVID-19 virus infection 11/30/2021    Depression     GERD (gastroesophageal reflux disease)     Hyperlipidemia     Hypertension     Productive cough     Scoliosis          Past Surgical History:   Procedure Laterality Date    EYE SURGERY Left 02/06/2019q    HIP SURGERY Right     JOINT REPLACEMENT      bilateral knee replacement    LIPOMA EXCISION      from shoulder    TOTAL KNEE ARTHROPLASTY Left 2017       Current Outpatient Medications on File Prior to Visit   Medication Sig Dispense Refill    aspirin (ASPIR) 81 MG EC tablet Take 1 tablet by mouth Daily. 90 tablet 3    Calcium Carbonate 500 MG chewable tablet Chew 1 tablet Daily. 90 each 3    cetirizine (zyrTEC) 10 MG tablet Take 0.5 tablets by mouth Every Night. 30 tablet 5    cholecalciferol  (Vitamin D) 25 MCG (1000 UT) tablet Take 1 tablet by mouth Daily. 30 tablet 5    metoprolol succinate XL (TOPROL-XL) 50 MG 24 hr tablet Take 1 tablet by mouth Every Night. 90 tablet 1    sertraline (ZOLOFT) 25 MG tablet Take 1 tablet by mouth Daily. 90 tablet 1    [DISCONTINUED] fluticasone (FLONASE) 50 MCG/ACT nasal spray Administer 2 sprays into the nostril(s) as directed by provider Daily. (Patient not taking: Reported on 12/10/2024) 16 g 11    [DISCONTINUED] lidocaine (LIDODERM) 5 % Place 1 patch on the skin as directed by provider Daily. Remove & Discard patch within 12 hours or as directed by MD (Patient not taking: Reported on 12/10/2024) 30 each 1    [DISCONTINUED] rosuvastatin (Crestor) 10 MG tablet Take 1 tablet by mouth Every Night. (Patient not taking: Reported on 12/10/2024) 90 tablet 3    [DISCONTINUED] vitamin D (ERGOCALCIFEROL) 1.25 MG (90111 UT) capsule capsule Take 1 capsule by mouth 1 (One) Time Per Week. (Patient not taking: Reported on 12/10/2024) 12 capsule 1     No current facility-administered medications on file prior to visit.       Social History     Socioeconomic History    Marital status:    Tobacco Use    Smoking status: Former     Current packs/day: 0.00     Average packs/day: 1 pack/day for 5.0 years (5.0 ttl pk-yrs)     Types: Cigarettes     Start date:      Quit date: 1960     Years since quittin.9    Smokeless tobacco: Never    Tobacco comments:     CAFFEINE USE   Vaping Use    Vaping status: Never Used   Substance and Sexual Activity    Alcohol use: No    Drug use: No             Procedures    ECG 12 Lead    Date/Time: 12/10/2024 1:02 PM  Performed by: Lucy Garcia MD    Authorized by: Lucy Garcia MD  Comparison: compared with previous ECG   Similar to previous ECG  Rhythm: sinus rhythm  T inversion: I, aVL, V2, V3, V4, V5 and V6              Objective:      Vitals:    12/10/24 1248   BP: 118/84   Pulse: 64   Weight: 71.2 kg (157 lb)   Height:  "165.1 cm (65\")     Body mass index is 26.13 kg/m².    Vitals reviewed.   Constitutional:       General: Not in acute distress.     Appearance: Not diaphoretic.   Neck:      Vascular: No carotid bruit or JVD.   Pulmonary:      Effort: Pulmonary effort is normal.      Breath sounds: Normal breath sounds.   Cardiovascular:      Normal rate. Regular rhythm.      Murmurs: There is no murmur.      No gallop.  No rub.   Pulses:     Intact distal pulses.      Carotid: 2+ bilaterally.     Radial: 2+ bilaterally.     Dorsalis pedis: 2+ bilaterally.     Posterior tibial: 2+ bilaterally.  Edema:     Peripheral edema absent.   Neurological:      Cranial Nerves: No cranial nerve deficit.         Lab Review:       Assessment:      Diagnosis Plan   1. Coronary artery calcification  ECG 12 Lead      2. Hyperlipidemia, unspecified hyperlipidemia type  ECG 12 Lead          Abnormal EKG with anterior T-wave inversions.  stable.   PACs and PVCs, treated with metoprolol.  She was having palpitations.  She does not have these anymore.  Hyperlipidemia.  On rosuvastatin, started recently.    H/o PAF, off Eliquis, last several monitors have not shown atrial fibrillation and she is trying to control her heart rhythm with lifestyle changes.  She has lost weight and she is exercising.  Coronary calcification, mild, on aspirin.     Plan:       Continue to maintain her weight loss and advised exercise on treadmill.  See Renee back in 1 year, no medication changes at this time.  Remain off of Eliquis.  "

## 2025-04-02 NOTE — PROGRESS NOTES
"Chief Complaint  Conjunctivitis (Pt presents here today for her R eye, itching, burning, painful, and pus-yellow and green.)    Subjective          Deja Juárez presents to [facility]  Patient presents for evaluation of right eye irritation and drainage.  This is a 76-year-old female patient of Dr. Smiley.  This patient is new to me.  Symptoms began 5 days ago.  She endorses itching, burning, pain, redness and yellow discharge from the right eye.  No fever or chills.  No known injury to her eye but she does state that she was in her garden trimming plants 5 days ago and believes she may have rubbed her eye with dirt on her hands.  Denies development of any other new issues today.      Objective   Vital Signs:   /84 (BP Location: Right arm, Patient Position: Sitting, Cuff Size: Large Adult)   Pulse 99   Temp 98.1 °F (36.7 °C)   Resp 19   Ht 165.1 cm (65\")   Wt 73.5 kg (162 lb)   SpO2 94%   BMI 26.96 kg/m²     Physical Exam  Vitals and nursing note reviewed.   Constitutional:       General: She is not in acute distress.     Appearance: Normal appearance. She is well-developed. She is not ill-appearing, toxic-appearing or diaphoretic.   HENT:      Head: Atraumatic.   Eyes:      Conjunctiva/sclera:      Right eye: Right conjunctiva is injected. Exudate present.      Left eye: Left conjunctiva is not injected. No chemosis, exudate or hemorrhage.     Pupils: Pupils are equal, round, and reactive to light.   Cardiovascular:      Rate and Rhythm: Normal rate and regular rhythm.      Pulses: Normal pulses.      Heart sounds: Normal heart sounds.   Pulmonary:      Effort: Pulmonary effort is normal. No respiratory distress.      Breath sounds: Normal breath sounds. No stridor. No wheezing, rhonchi or rales.   Chest:      Chest wall: No tenderness.   Abdominal:      General: Bowel sounds are normal. There is no distension.      Palpations: Abdomen is soft.      Tenderness: There is " no no abdominal tenderness.   Musculoskeletal:         General: Normal range of motion.      Cervical back: Normal range of motion and neck supple.   Skin:     General: Skin is warm and dry.      Capillary Refill: Capillary refill takes less than 2 seconds.   Neurological:      General: No focal deficit present.      Mental Status: She is alert and oriented to person, place, and time. Mental status is at baseline.   Psychiatric:         Mood and Affect: Mood normal.         Behavior: Behavior normal.         Thought Content: Thought content normal.         Judgment: Judgment normal.        Result Review :   The following data was reviewed by: FRIDA De La Vega on 08/19/2021:  Common labs    Common Labsle 12/10/20 12/10/20 7/8/21 7/8/21    1021 1021 1020 1020   Glucose 97  89    BUN 14  11    Creatinine 0.71  0.70    eGFR Non  Am 80  82    eGFR African Am 98  99    Sodium 137  136    Potassium 4.6  5.1    Chloride 100  99    Calcium 9.6  9.9    Total Protein 6.7  7.0    Albumin 4.40  4.40    Total Bilirubin 0.8  0.6    Alkaline Phosphatase 104  106    AST (SGOT) 15  16    ALT (SGPT) 15  13    Total Cholesterol  253 (A)  242 (A)   Triglycerides  173 (A)  154 (A)   HDL Cholesterol  58  48   LDL Cholesterol   164 (A)  166 (A)   (A) Abnormal value       Comments are available for some flowsheets but are not being displayed.           Current outpatient and discharge medications have been reconciled for the patient.  Reviewed by: FRIDA De La Vega           Assessment and Plan    Diagnoses and all orders for this visit:    1. Bacterial conjunctivitis (Primary)  -     ciprofloxacin (CILOXAN) 0.3 % ophthalmic ointment; Administer  to the right eye 2 (Two) Times a Day.  Dispense: 3.5 g; Refill: 0    Treating bacterial conjunctivitis of the right eye with ciprofloxacin 0.3% ophthalmic ointment, she will apply twice daily.    Follow-up as needed if symptoms persist or worsen and routinely with PCP,   Baljit.    Follow Up   Return if symptoms worsen or fail to improve, for Next scheduled follow up.  Patient was given instructions and counseling regarding her condition or for health maintenance advice. Please see specific information pulled into the AVS if appropriate.

## 2025-07-30 ENCOUNTER — OFFICE VISIT (OUTPATIENT)
Dept: FAMILY MEDICINE CLINIC | Facility: CLINIC | Age: 80
End: 2025-07-30
Payer: MEDICARE

## 2025-07-30 VITALS
BODY MASS INDEX: 26.49 KG/M2 | RESPIRATION RATE: 20 BRPM | TEMPERATURE: 98 F | WEIGHT: 159 LBS | SYSTOLIC BLOOD PRESSURE: 120 MMHG | HEART RATE: 72 BPM | DIASTOLIC BLOOD PRESSURE: 80 MMHG | HEIGHT: 65 IN | OXYGEN SATURATION: 96 %

## 2025-07-30 DIAGNOSIS — Z12.31 BREAST CANCER SCREENING BY MAMMOGRAM: ICD-10-CM

## 2025-07-30 DIAGNOSIS — M25.552 LEFT HIP PAIN: ICD-10-CM

## 2025-07-30 DIAGNOSIS — M85.80 OSTEOPENIA, UNSPECIFIED LOCATION: ICD-10-CM

## 2025-07-30 DIAGNOSIS — R35.0 URINARY FREQUENCY: ICD-10-CM

## 2025-07-30 DIAGNOSIS — M41.25 IDIOPATHIC SCOLIOSIS OF THORACOLUMBAR REGION: ICD-10-CM

## 2025-07-30 DIAGNOSIS — J30.89 ENVIRONMENTAL AND SEASONAL ALLERGIES: Chronic | ICD-10-CM

## 2025-07-30 DIAGNOSIS — E55.9 VITAMIN D DEFICIENCY: ICD-10-CM

## 2025-07-30 DIAGNOSIS — Z78.0 POST-MENOPAUSAL: ICD-10-CM

## 2025-07-30 DIAGNOSIS — Z00.00 MEDICARE ANNUAL WELLNESS VISIT, SUBSEQUENT: Primary | ICD-10-CM

## 2025-07-30 DIAGNOSIS — I48.0 PAF (PAROXYSMAL ATRIAL FIBRILLATION): Chronic | ICD-10-CM

## 2025-07-30 DIAGNOSIS — N30.00 ACUTE CYSTITIS WITHOUT HEMATURIA: ICD-10-CM

## 2025-07-30 DIAGNOSIS — R79.9 ABNORMAL FINDING OF BLOOD CHEMISTRY, UNSPECIFIED: ICD-10-CM

## 2025-07-30 DIAGNOSIS — Z13.220 LIPID SCREENING: ICD-10-CM

## 2025-07-30 DIAGNOSIS — F41.9 ANXIETY DISORDER, UNSPECIFIED TYPE: Chronic | ICD-10-CM

## 2025-07-30 DIAGNOSIS — R53.83 OTHER FATIGUE: ICD-10-CM

## 2025-07-30 LAB
BILIRUB BLD-MCNC: NEGATIVE MG/DL
CLARITY, POC: CLEAR
COLOR UR: YELLOW
EXPIRATION DATE: ABNORMAL
GLUCOSE UR STRIP-MCNC: NEGATIVE MG/DL
KETONES UR QL: NEGATIVE
LEUKOCYTE EST, POC: NEGATIVE
Lab: ABNORMAL
NITRITE UR-MCNC: NEGATIVE MG/ML
PH UR: 5.5 [PH] (ref 5–8)
PROT UR STRIP-MCNC: NEGATIVE MG/DL
RBC # UR STRIP: ABNORMAL /UL
SP GR UR: 1.02 (ref 1–1.03)
UROBILINOGEN UR QL: NORMAL

## 2025-07-30 RX ORDER — ASPIRIN 81 MG/1
81 TABLET ORAL DAILY
Qty: 90 TABLET | Refills: 3 | Status: SHIPPED | OUTPATIENT
Start: 2025-07-30 | End: 2025-07-30

## 2025-07-30 RX ORDER — METOPROLOL SUCCINATE 50 MG/1
50 TABLET, EXTENDED RELEASE ORAL NIGHTLY
Qty: 90 TABLET | Refills: 2 | Status: SHIPPED | OUTPATIENT
Start: 2025-07-30

## 2025-07-30 RX ORDER — ANTACID TABLETS 500 MG/1
1 TABLET, CHEWABLE ORAL DAILY
Qty: 90 EACH | Refills: 3 | Status: SHIPPED | OUTPATIENT
Start: 2025-07-30

## 2025-07-30 RX ORDER — SERTRALINE HYDROCHLORIDE 25 MG/1
25 TABLET, FILM COATED ORAL DAILY
Qty: 90 TABLET | Refills: 2 | Status: SHIPPED | OUTPATIENT
Start: 2025-07-30 | End: 2025-07-30

## 2025-07-30 RX ORDER — ANTACID TABLETS 500 MG/1
1 TABLET, CHEWABLE ORAL DAILY
Qty: 90 EACH | Refills: 3 | Status: SHIPPED | OUTPATIENT
Start: 2025-07-30 | End: 2025-07-30

## 2025-07-30 RX ORDER — SERTRALINE HYDROCHLORIDE 25 MG/1
25 TABLET, FILM COATED ORAL DAILY
Qty: 90 TABLET | Refills: 2 | Status: SHIPPED | OUTPATIENT
Start: 2025-07-30

## 2025-07-30 RX ORDER — NITROFURANTOIN 25; 75 MG/1; MG/1
100 CAPSULE ORAL 2 TIMES DAILY
Qty: 10 CAPSULE | Refills: 0 | Status: SHIPPED | OUTPATIENT
Start: 2025-07-30

## 2025-07-30 RX ORDER — METOPROLOL SUCCINATE 50 MG/1
50 TABLET, EXTENDED RELEASE ORAL NIGHTLY
Qty: 90 TABLET | Refills: 2 | Status: SHIPPED | OUTPATIENT
Start: 2025-07-30 | End: 2025-07-30

## 2025-07-30 RX ORDER — CHOLECALCIFEROL (VITAMIN D3) 25 MCG
1000 TABLET ORAL DAILY
Qty: 90 TABLET | Refills: 3 | Status: SHIPPED | OUTPATIENT
Start: 2025-07-30

## 2025-07-30 RX ORDER — CETIRIZINE HYDROCHLORIDE 10 MG/1
5 TABLET ORAL NIGHTLY
Qty: 90 TABLET | Refills: 3 | Status: SHIPPED | OUTPATIENT
Start: 2025-07-30 | End: 2025-07-30

## 2025-07-30 RX ORDER — ELECTROLYTES/DEXTROSE
1 SOLUTION, ORAL ORAL DAILY
Qty: 90 TABLET | Refills: 3 | Status: SHIPPED | OUTPATIENT
Start: 2025-07-30 | End: 2026-07-25

## 2025-07-30 RX ORDER — ASPIRIN 81 MG/1
81 TABLET ORAL DAILY
Qty: 90 TABLET | Refills: 3 | Status: SHIPPED | OUTPATIENT
Start: 2025-07-30

## 2025-07-30 RX ORDER — CHOLECALCIFEROL (VITAMIN D3) 25 MCG
1000 TABLET ORAL DAILY
Qty: 90 TABLET | Refills: 3 | Status: SHIPPED | OUTPATIENT
Start: 2025-07-30 | End: 2025-07-30

## 2025-07-30 RX ORDER — ELECTROLYTES/DEXTROSE
1 SOLUTION, ORAL ORAL DAILY
Qty: 90 TABLET | Refills: 3 | Status: SHIPPED | OUTPATIENT
Start: 2025-07-30 | End: 2025-07-30

## 2025-07-30 RX ORDER — CETIRIZINE HYDROCHLORIDE 10 MG/1
5 TABLET ORAL NIGHTLY
Qty: 90 TABLET | Refills: 3 | Status: SHIPPED | OUTPATIENT
Start: 2025-07-30

## 2025-07-30 NOTE — ASSESSMENT & PLAN NOTE
Encouraged patient to drink more water and less tea.  Orders:    POC Urinalysis Dipstick, Automated    Hemoglobin A1c

## 2025-07-30 NOTE — ASSESSMENT & PLAN NOTE
Pt to f/u Betty tomorrow for the hip pain.    Fall precautions education discussed and provided to patient which included: removing throw and loose rugs, using adequate lightening at all times, using night lights, using non-slip carpets; wearing non-slip shoes both inside and outside the house; using and installing supports such as grab bars, hand rails, cane, walker, shower chair, non-slip mats in bathtub; using accessible cabinets.    Also educated and counseled patient to not change position too quickly from lying to sitting; and lying/sitting to standing.  Also instructed patient to follow-up with a eye doctor once a year for annual eye exam.  Instructed patient to follow up with Provider for any change or worsening of mobility status, patient voiced understanding.

## 2025-07-30 NOTE — PROGRESS NOTES
Subjective   The ABCs of the Annual Wellness Visit  Medicare Wellness Visit      Deja Juárez is a 80 y.o. patient who presents for a Medicare Wellness Visit.    The following portions of the patient's history were reviewed and   updated as appropriate: allergies, current medications, past family history, past medical history, past social history, past surgical history, and problem list.    Compared to one year ago, the patient's physical   health is the same.  Compared to one year ago, the patient's mental   health is the same.    Recent Hospitalizations:  She was not admitted to the hospital during the last year.     Current Medical Providers:  Patient Care Team:  Kosta Ritter APRN as PCP - General (Nurse Practitioner)  Asmita Fox APRN as Nurse Practitioner (Internal Medicine)    Outpatient Medications Prior to Visit   Medication Sig Dispense Refill    aspirin (ASPIR) 81 MG EC tablet Take 1 tablet by mouth Daily. 90 tablet 3    Calcium Carbonate 500 MG chewable tablet Chew 1 tablet Daily. 90 each 3    cetirizine (zyrTEC) 10 MG tablet Take 0.5 tablets by mouth Every Night. 30 tablet 5    cholecalciferol (Vitamin D) 25 MCG (1000 UT) tablet Take 1 tablet by mouth Daily. 30 tablet 5    metoprolol succinate XL (TOPROL-XL) 50 MG 24 hr tablet Take 1 tablet by mouth Every Night. 90 tablet 1    sertraline (ZOLOFT) 25 MG tablet Take 1 tablet by mouth Daily. 90 tablet 1     No facility-administered medications prior to visit.     No opioid medication identified on active medication list. I have reviewed chart for other potential  high risk medication/s and harmful drug interactions in the elderly.      Aspirin is on active medication list. Aspirin use is indicated based on review of current medical condition/s. Pros and cons of this therapy have been discussed today. Benefits of this medication outweigh potential harm.  Patient has been encouraged to continue taking this medication.  .      Patient  "Active Problem List   Diagnosis    Hyperlipidemia    Primary localized osteoarthritis of left knee    Abnormal ECG    Knee pain    GERD (gastroesophageal reflux disease)    Primary localized osteoarthritis of right knee    Palpitations    Anxiety    PVC (premature ventricular contraction)    PAF (paroxysmal atrial fibrillation)    Nonrheumatic mitral valve regurgitation    Coronary artery calcification    Anxiety disorder    Other fatigue    Non-compliance    Fall    Environmental and seasonal allergies    Osteopenia    Vitamin D deficiency    Medicare annual wellness visit, subsequent    Urinary frequency    Left hip pain    Lipid screening    Acute cystitis without hematuria    Breast cancer screening by mammogram    Post-menopausal    Abnormal finding of blood chemistry, unspecified    Idiopathic scoliosis of thoracolumbar region     Advance Care Planning Advance Directive is not on file.  ACP discussion was held with the patient during this visit. Patient has an advance directive (not in EMR), copy requested.            Objective   Vitals:    07/30/25 1250   BP: 120/80   BP Location: Left arm   Patient Position: Sitting   Cuff Size: Adult   Pulse: 72   Resp: 20   Temp: 98 °F (36.7 °C)   SpO2: 96%   Weight: 72.1 kg (159 lb)   Height: 165.1 cm (65\")       Estimated body mass index is 26.46 kg/m² as calculated from the following:    Height as of this encounter: 165.1 cm (65\").    Weight as of this encounter: 72.1 kg (159 lb).    BMI is >= 25 and <30. (Overweight) The following options were offered after discussion;: exercise counseling/recommendations and nutrition counseling/recommendations         Gait and Balance Evaluation:  Normal    Does the patient have evidence of cognitive impairment? No                                                                                                Health  Risk Assessment    Smoking Status:  Social History     Tobacco Use   Smoking Status Former    Current packs/day: 0.00 "    Average packs/day: 1 pack/day for 5.0 years (5.0 ttl pk-yrs)    Types: Cigarettes    Start date:     Quit date: 1960    Years since quittin.6   Smokeless Tobacco Never   Tobacco Comments    CAFFEINE USE     Alcohol Consumption:  Social History     Substance and Sexual Activity   Alcohol Use No       Fall Risk Screen  STEADI Fall Risk Assessment was completed, and patient is at LOW risk for falls.Assessment completed on:2025    Depression Screening   Little interest or pleasure in doing things? Not at all   Feeling down, depressed, or hopeless? Not at all   PHQ-2 Total Score 0      Health Habits and Functional and Cognitive Screenin/30/2025     1:05 PM   Functional & Cognitive Status   Do you have difficulty preparing food and eating? No   Do you have difficulty bathing yourself, getting dressed or grooming yourself? No   Do you have difficulty using the toilet? No   Do you have difficulty moving around from place to place? No   Do you have trouble with steps or getting out of a bed or a chair? No   Current Diet Well Balanced Diet   Dental Exam Up to date   Eye Exam Up to date   Exercise (times per week) 1 times per week   Current Exercises Include Walking   Do you need help using the phone?  No   Are you deaf or do you have serious difficulty hearing?  No   Do you need help to go to places out of walking distance? No   Do you need help shopping? No   Do you need help preparing meals?  No   Do you need help with housework?  No   Do you need help with laundry? No   Do you need help taking your medications? No   Do you need help managing money? No   Do you ever drive or ride in a car without wearing a seat belt? No   Have you felt unusual fatigue (could be tiredness), stress, anger or loneliness in the last month? No   Who do you live with? Spouse   If you need help, do you have trouble finding someone available to you? No   Have you been bothered in the last four weeks by sexual problems? No    Do you have difficulty concentrating, remembering or making decisions? No           Age-appropriate Screening Schedule:  Refer to the list below for future screening recommendations based on patient's age, sex and/or medical conditions. Orders for these recommended tests are listed in the plan section. The patient has been provided with a written plan.    Health Maintenance List  Health Maintenance   Topic Date Due    ZOSTER VACCINE (1 of 2) Never done    DXA SCAN  12/01/2022    LIPID PANEL  02/06/2025    COVID-19 Vaccine (1 - 2024-25 season) 08/13/2025 (Originally 9/1/2024)    INFLUENZA VACCINE  10/01/2025    ANNUAL WELLNESS VISIT  07/30/2026    TDAP/TD VACCINES (3 - Td or Tdap) 04/22/2030    HEPATITIS C SCREENING  Completed    Pneumococcal Vaccine 50+  Completed    RSV Vaccine - Adults  Discontinued    MAMMOGRAM  Discontinued    COLORECTAL CANCER SCREENING  Discontinued                                                                                                                                                CMS Preventative Services Quick Reference  Risk Factors Identified During Encounter  Fall Risk-High or Moderate: Discussed Fall Prevention in the home and Information on Fall Prevention Shared in After Visit Summary  Immunizations Discussed/Encouraged: Shingrix and COVID19  Dental Screening Recommended  Vision Screening Recommended    The above risks/problems have been discussed with the patient.  Pertinent information has been shared with the patient in the After Visit Summary.  An After Visit Summary and PPPS were made available to the patient.    Follow Up:   Next Medicare Wellness visit to be scheduled in 1 year.         Additional E&M Note during same encounter follows:  Patient has additional, significant, and separately identifiable condition(s)/problem(s) that require work above and beyond the Medicare Wellness Visit     Chief Complaint  Medicare Wellness-subsequent (Blood taken /No c/c  ")    Subjective   80-year-old white female here for chronic disease management follow-up visit and annual Medicare wellness visit.  Patient reports she has been having left hip pain and has an upcoming appointment soon with Binta Escamilla has an orthopedic surgery.  I have discussed fall precautions with patient today and also checked in with patient in her AVS.  Reminded patient to bring her advance directives to be placed on file, patient voiced understanding.    Patient reports she has not fallen in the last 12 months and is not concerned about falling either.      Patient denied any Chest pain, Shortness of Air, palpitations, Dizziness, Headache, Blurred vision, or weakness/numbness.       Instructed patient to get the adult preventive immunization that are due at  the pharmacy, including -shingles and COVID booster.    Pt refused colorectal screening in any form.  Patient complaining of a few day history of increased urinary frequency but denied any significant dysuria.  Patient denied history of renal stones.      Deja is also being seen today for an annual adult preventative physical exam.                 Objective   Vital Signs:  /80 (BP Location: Left arm, Patient Position: Sitting, Cuff Size: Adult)   Pulse 72   Temp 98 °F (36.7 °C)   Resp 20   Ht 165.1 cm (65\")   Wt 72.1 kg (159 lb)   SpO2 96%   BMI 26.46 kg/m²   Physical Exam  Constitutional:       Appearance: Normal appearance.   HENT:      Head: Normocephalic and atraumatic.   Eyes:      Conjunctiva/sclera: Conjunctivae normal.   Cardiovascular:      Rate and Rhythm: Normal rate and regular rhythm.      Heart sounds: Normal heart sounds.   Pulmonary:      Effort: Pulmonary effort is normal.      Breath sounds: Normal breath sounds.   Abdominal:      General: Bowel sounds are normal.      Palpations: Abdomen is soft.      Comments: Non-tender   Musculoskeletal:      Comments: No lumbar spine pain on palpation.  No hip pain on palpation or " ROM B/L.  Some stiffness on right hip with ROM.   Skin:     General: Skin is warm.   Neurological:      General: No focal deficit present.      Mental Status: She is alert and oriented to person, place, and time.   Psychiatric:         Mood and Affect: Mood normal.         Behavior: Behavior normal.         The following data was reviewed by: FRIDA Boyd on 07/30/2025:  Data reviewed : Radiologic studies negative mammogram done in April 2024.  Chest x-ray from October 2024 revealed thoracolumbar scoliosis.  and Consultant notes reviewed last ophthalmology consult note from January 2025 patient diagnosed with cataracts.  Reviewed last cardiology consult note from December 2024 with Dr. Garcia.  Common labs          11/19/2024    12:07 11/19/2024    14:33   Common Labs   Glucose 89     BUN 14     Creatinine 0.75     Sodium 136     Potassium 5.1     Chloride 99     Calcium 9.6     Albumin 4.4     Total Bilirubin 0.9     Alkaline Phosphatase 99     AST (SGOT) 20     ALT (SGPT) 17     WBC 7.5     Hemoglobin 14.1     Hematocrit 42.5     Platelets 388     Hemoglobin A1C  5.6      CMP          11/19/2024    12:07   CMP   Glucose 89    BUN 14    Creatinine 0.75    EGFR 81    Sodium 136    Potassium 5.1    Chloride 99    Calcium 9.6    Total Protein 6.8    Albumin 4.4    Globulin 2.4    Total Bilirubin 0.9    Alkaline Phosphatase 99    AST (SGOT) 20    ALT (SGPT) 17    BUN/Creatinine Ratio 19      CBC          11/19/2024    12:07   CBC   WBC 7.5    RBC 4.78    Hemoglobin 14.1    Hematocrit 42.5    MCV 89    MCH 29.5    MCHC 33.2    RDW 11.8    Platelets 388        TSH          11/19/2024    12:07   TSH   TSH 3.420      A1C Last 3 Results          11/19/2024    14:33   HGBA1C Last 3 Results   Hemoglobin A1C 5.6             Assessment and Plan      Medicare annual wellness visit, subsequent  Counseling was provided on nutrition, physical activity, development, and injury prevention, dental health, and safe  sex practices patient verbalizes understanding no additional questions were asked.            Urinary frequency  Encouraged patient to drink more water and less tea.  Orders:    POC Urinalysis Dipstick, Automated    Hemoglobin A1c    Vitamin D deficiency  Continue daily calcium and vitamin D.       Anxiety disorder, unspecified type  Stable and under control on current regimen.  No SI/HI.    Orders:    sertraline (ZOLOFT) 25 MG tablet; Take 1 tablet by mouth Daily.    T4, free    TSH    PAF (paroxysmal atrial fibrillation)  Stable.  Orders:    aspirin (ASPIR) 81 MG EC tablet; Take 1 tablet by mouth Daily.    metoprolol succinate XL (TOPROL-XL) 50 MG 24 hr tablet; Take 1 tablet by mouth Every Night.    CBC & Differential    Comprehensive Metabolic Panel    T4, free    TSH    Vitamin B12    Osteopenia, unspecified location  Continue daily calcium and vitamin D.  Discussed repeat DEXA scan, patient voiced understanding.  Orders:    Calcium Carbonate 500 MG chewable tablet; Chew 1 tablet Daily.    cholecalciferol (Vitamin D) 25 MCG (1000 UT) tablet; Take 1 tablet by mouth Daily.    DEXA Bone Density Axial; Future    Vitamin D,25-Hydroxy    Environmental and seasonal allergies  Stable  Orders:    cetirizine (zyrTEC) 10 MG tablet; Take 0.5 tablets by mouth Every Night.    Lipid screening    Orders:    Lipid Panel    Acute cystitis without hematuria    Orders:    nitrofurantoin, macrocrystal-monohydrate, (MACROBID) 100 MG capsule; Take 1 capsule by mouth 2 (Two) Times a Day.    Other fatigue    Orders:    multivitamin with minerals (Multivitamin Adult) tablet tablet; Take 1 tablet by mouth Daily for 360 days.    Left hip pain  Pt to f/u Betty tomorrow for the hip pain.    Fall precautions education discussed and provided to patient which included: removing throw and loose rugs, using adequate lightening at all times, using night lights, using non-slip carpets; wearing non-slip shoes both inside and outside  the house; using and installing supports such as grab bars, hand rails, cane, walker, shower chair, non-slip mats in bathtub; using accessible cabinets.    Also educated and counseled patient to not change position too quickly from lying to sitting; and lying/sitting to standing.  Also instructed patient to follow-up with a eye doctor once a year for annual eye exam.  Instructed patient to follow up with Provider for any change or worsening of mobility status, patient voiced understanding.         Breast cancer screening by mammogram    Orders:    Mammo Screening Digital Tomosynthesis Bilateral With CAD; Future    Post-menopausal    Orders:    DEXA Bone Density Axial; Future    Abnormal finding of blood chemistry, unspecified    Orders:    Hemoglobin A1c    Idiopathic scoliosis of thoracolumbar region  Stable           Instructed patient to read the after visit summary that will be provided at checkout today, for educational information and recommendations for the medical conditions that patient had presented with today and also any chronic conditions.    All chronic conditions have been addressed and treated by the practice or other specialists. Medications have been reconciled and refilled as appropriate. Reiterated compliance and timely follow up appointments. Side effects of all new and old medications reviewed with the patient and patient willing to accept all risks involved. Advised RTO if no improvement or worsening of symptoms or if any new complaints arise. Patient advised to follow up with clinic or call after diagnostic tests, if patient does not hear from office 3 days after the test completion.       Follow Up   Return in about 6 months (around 1/30/2026) for Next scheduled follow up.  Patient was given instructions and counseling regarding her condition or for health maintenance advice. Please see specific information pulled into the AVS if appropriate.

## 2025-07-30 NOTE — ASSESSMENT & PLAN NOTE
Stable.  Orders:    aspirin (ASPIR) 81 MG EC tablet; Take 1 tablet by mouth Daily.    metoprolol succinate XL (TOPROL-XL) 50 MG 24 hr tablet; Take 1 tablet by mouth Every Night.    CBC & Differential    Comprehensive Metabolic Panel    T4, free    TSH    Vitamin B12

## 2025-07-30 NOTE — ASSESSMENT & PLAN NOTE
Orders:    multivitamin with minerals (Multivitamin Adult) tablet tablet; Take 1 tablet by mouth Daily for 360 days.

## 2025-07-30 NOTE — ASSESSMENT & PLAN NOTE
Counseling was provided on nutrition, physical activity, development, and injury prevention, dental health, and safe sex practices patient verbalizes understanding no additional questions were asked.

## 2025-07-30 NOTE — ASSESSMENT & PLAN NOTE
Continue daily calcium and vitamin D.  Discussed repeat DEXA scan, patient voiced understanding.  Orders:    Calcium Carbonate 500 MG chewable tablet; Chew 1 tablet Daily.    cholecalciferol (Vitamin D) 25 MCG (1000 UT) tablet; Take 1 tablet by mouth Daily.    DEXA Bone Density Axial; Future    Vitamin D,25-Hydroxy

## 2025-07-30 NOTE — ASSESSMENT & PLAN NOTE
Orders:    nitrofurantoin, macrocrystal-monohydrate, (MACROBID) 100 MG capsule; Take 1 capsule by mouth 2 (Two) Times a Day.

## 2025-07-30 NOTE — ASSESSMENT & PLAN NOTE
Stable and under control on current regimen.  No SI/HI.    Orders:    sertraline (ZOLOFT) 25 MG tablet; Take 1 tablet by mouth Daily.    T4, free    TSH

## 2025-07-31 LAB
25(OH)D3+25(OH)D2 SERPL-MCNC: 33.7 NG/ML (ref 30–100)
ALBUMIN SERPL-MCNC: 4.4 G/DL (ref 3.5–5.2)
ALBUMIN/GLOB SERPL: 1.6 G/DL
ALP SERPL-CCNC: 98 U/L (ref 39–117)
ALT SERPL-CCNC: 13 U/L (ref 1–33)
AST SERPL-CCNC: 19 U/L (ref 1–32)
BASOPHILS # BLD AUTO: 0.1 10*3/MM3 (ref 0–0.2)
BASOPHILS NFR BLD AUTO: 1 % (ref 0–1.5)
BILIRUB SERPL-MCNC: 0.8 MG/DL (ref 0–1.2)
BUN SERPL-MCNC: 16 MG/DL (ref 8–23)
BUN/CREAT SERPL: 17.2 (ref 7–25)
CALCIUM SERPL-MCNC: 10.3 MG/DL (ref 8.6–10.5)
CHLORIDE SERPL-SCNC: 98 MMOL/L (ref 98–107)
CHOLEST SERPL-MCNC: 263 MG/DL (ref 0–200)
CO2 SERPL-SCNC: 26 MMOL/L (ref 22–29)
CREAT SERPL-MCNC: 0.93 MG/DL (ref 0.57–1)
EGFRCR SERPLBLD CKD-EPI 2021: 62.6 ML/MIN/1.73
EOSINOPHIL # BLD AUTO: 0.3 10*3/MM3 (ref 0–0.4)
EOSINOPHIL NFR BLD AUTO: 3 % (ref 0.3–6.2)
ERYTHROCYTE [DISTWIDTH] IN BLOOD BY AUTOMATED COUNT: 12 % (ref 12.3–15.4)
GLOBULIN SER CALC-MCNC: 2.7 GM/DL
GLUCOSE SERPL-MCNC: 85 MG/DL (ref 65–99)
HBA1C MFR BLD: 5.6 % (ref 4.8–5.6)
HCT VFR BLD AUTO: 45 % (ref 34–46.6)
HDLC SERPL-MCNC: 59 MG/DL (ref 40–60)
HGB BLD-MCNC: 14.4 G/DL (ref 12–15.9)
IMM GRANULOCYTES # BLD AUTO: 0.04 10*3/MM3 (ref 0–0.05)
IMM GRANULOCYTES NFR BLD AUTO: 0.4 % (ref 0–0.5)
LDLC SERPL CALC-MCNC: 171 MG/DL (ref 0–100)
LYMPHOCYTES # BLD AUTO: 3.25 10*3/MM3 (ref 0.7–3.1)
LYMPHOCYTES NFR BLD AUTO: 32.7 % (ref 19.6–45.3)
MCH RBC QN AUTO: 28.9 PG (ref 26.6–33)
MCHC RBC AUTO-ENTMCNC: 32 G/DL (ref 31.5–35.7)
MCV RBC AUTO: 90.4 FL (ref 79–97)
MONOCYTES # BLD AUTO: 0.91 10*3/MM3 (ref 0.1–0.9)
MONOCYTES NFR BLD AUTO: 9.2 % (ref 5–12)
NEUTROPHILS # BLD AUTO: 5.33 10*3/MM3 (ref 1.7–7)
NEUTROPHILS NFR BLD AUTO: 53.7 % (ref 42.7–76)
NRBC BLD AUTO-RTO: 0 /100 WBC (ref 0–0.2)
PLATELET # BLD AUTO: 394 10*3/MM3 (ref 140–450)
POTASSIUM SERPL-SCNC: 5.3 MMOL/L (ref 3.5–5.2)
PROT SERPL-MCNC: 7.1 G/DL (ref 6–8.5)
RBC # BLD AUTO: 4.98 10*6/MM3 (ref 3.77–5.28)
SODIUM SERPL-SCNC: 137 MMOL/L (ref 136–145)
T4 FREE SERPL-MCNC: 1.06 NG/DL (ref 0.92–1.68)
TRIGL SERPL-MCNC: 181 MG/DL (ref 0–150)
TSH SERPL DL<=0.005 MIU/L-ACNC: 3.81 UIU/ML (ref 0.27–4.2)
VIT B12 SERPL-MCNC: 384 PG/ML (ref 211–946)
VLDLC SERPL CALC-MCNC: 33 MG/DL (ref 5–40)
WBC # BLD AUTO: 9.93 10*3/MM3 (ref 3.4–10.8)

## 2025-08-01 ENCOUNTER — RESULTS FOLLOW-UP (OUTPATIENT)
Dept: FAMILY MEDICINE CLINIC | Facility: CLINIC | Age: 80
End: 2025-08-01
Payer: MEDICARE

## 2025-08-01 DIAGNOSIS — E87.5 HYPERKALEMIA: Primary | ICD-10-CM

## 2025-08-10 DIAGNOSIS — E87.5 HYPERKALEMIA: Primary | ICD-10-CM

## 2025-08-22 ENCOUNTER — HOSPITAL ENCOUNTER (OUTPATIENT)
Dept: MAMMOGRAPHY | Facility: HOSPITAL | Age: 80
Discharge: HOME | End: 2025-08-22
Payer: MEDICARE

## 2025-08-22 ENCOUNTER — HOSPITAL ENCOUNTER (OUTPATIENT)
Dept: BONE DENSITY | Facility: HOSPITAL | Age: 80
Discharge: HOME | End: 2025-08-22
Payer: MEDICARE

## 2025-08-22 DIAGNOSIS — Z12.31 BREAST CANCER SCREENING BY MAMMOGRAM: ICD-10-CM

## 2025-08-22 DIAGNOSIS — M85.80 OSTEOPENIA, UNSPECIFIED LOCATION: ICD-10-CM

## 2025-08-22 DIAGNOSIS — Z78.0 POST-MENOPAUSAL: ICD-10-CM

## 2025-08-22 PROCEDURE — 77063 BREAST TOMOSYNTHESIS BI: CPT

## 2025-08-22 PROCEDURE — 77067 SCR MAMMO BI INCL CAD: CPT

## 2025-08-22 PROCEDURE — 77080 DXA BONE DENSITY AXIAL: CPT

## 2025-08-26 DIAGNOSIS — M81.0 AGE-RELATED OSTEOPOROSIS WITHOUT CURRENT PATHOLOGICAL FRACTURE: Primary | ICD-10-CM

## 2025-08-26 DIAGNOSIS — M85.80 OSTEOPENIA, UNSPECIFIED LOCATION: ICD-10-CM

## 2025-08-26 RX ORDER — MULTIVIT-MIN/IRON/FOLIC ACID/K 18-600-40
1 CAPSULE ORAL DAILY
Qty: 90 CAPSULE | Refills: 3 | Status: SHIPPED | OUTPATIENT
Start: 2025-08-26

## 2025-08-26 RX ORDER — ALENDRONATE SODIUM 70 MG/1
70 TABLET ORAL
Qty: 13 TABLET | Refills: 1 | Status: SHIPPED | OUTPATIENT
Start: 2025-08-26

## 2025-08-26 RX ORDER — ANTACID TABLETS 500 MG/1
1 TABLET, CHEWABLE ORAL 2 TIMES DAILY
Qty: 180 EACH | Refills: 3 | Status: SHIPPED | OUTPATIENT
Start: 2025-08-26